# Patient Record
Sex: FEMALE | Race: OTHER | HISPANIC OR LATINO | Employment: FULL TIME | URBAN - METROPOLITAN AREA
[De-identification: names, ages, dates, MRNs, and addresses within clinical notes are randomized per-mention and may not be internally consistent; named-entity substitution may affect disease eponyms.]

---

## 2017-03-31 ENCOUNTER — HOSPITAL ENCOUNTER (OUTPATIENT)
Dept: RADIOLOGY | Facility: HOSPITAL | Age: 47
Discharge: HOME | End: 2017-03-31
Payer: COMMERCIAL

## 2017-03-31 ENCOUNTER — ALLSCRIPTS OFFICE VISIT (OUTPATIENT)
Dept: OTHER | Facility: OTHER | Age: 47
End: 2017-03-31

## 2017-03-31 ENCOUNTER — TRANSCRIBE ORDERS (OUTPATIENT)
Dept: RADIOLOGY | Facility: HOSPITAL | Age: 47
End: 2017-03-31

## 2017-03-31 DIAGNOSIS — J40 BRONCHITIS: ICD-10-CM

## 2017-03-31 DIAGNOSIS — M54.9 DORSALGIA: ICD-10-CM

## 2017-03-31 DIAGNOSIS — R50.9 FEVER: ICD-10-CM

## 2017-03-31 PROCEDURE — 71020 HB CHEST X-RAY 2VW FRONTAL&LATL: CPT

## 2017-04-03 ENCOUNTER — GENERIC CONVERSION - ENCOUNTER (OUTPATIENT)
Dept: OTHER | Facility: OTHER | Age: 47
End: 2017-04-03

## 2017-04-13 ENCOUNTER — GENERIC CONVERSION - ENCOUNTER (OUTPATIENT)
Dept: OTHER | Facility: OTHER | Age: 47
End: 2017-04-13

## 2017-08-04 ENCOUNTER — APPOINTMENT (OUTPATIENT)
Dept: PHYSICAL THERAPY | Facility: MEDICAL CENTER | Age: 47
End: 2017-08-04
Payer: COMMERCIAL

## 2017-08-04 ENCOUNTER — GENERIC CONVERSION - ENCOUNTER (OUTPATIENT)
Dept: OTHER | Facility: OTHER | Age: 47
End: 2017-08-04

## 2017-08-04 PROCEDURE — 97161 PT EVAL LOW COMPLEX 20 MIN: CPT

## 2017-08-04 PROCEDURE — 97140 MANUAL THERAPY 1/> REGIONS: CPT

## 2017-08-07 ENCOUNTER — APPOINTMENT (OUTPATIENT)
Dept: PHYSICAL THERAPY | Facility: MEDICAL CENTER | Age: 47
End: 2017-08-07
Payer: COMMERCIAL

## 2017-08-07 PROCEDURE — 97010 HOT OR COLD PACKS THERAPY: CPT

## 2017-08-07 PROCEDURE — 97110 THERAPEUTIC EXERCISES: CPT

## 2017-08-07 PROCEDURE — 97140 MANUAL THERAPY 1/> REGIONS: CPT

## 2017-08-11 ENCOUNTER — APPOINTMENT (OUTPATIENT)
Dept: PHYSICAL THERAPY | Facility: MEDICAL CENTER | Age: 47
End: 2017-08-11
Payer: COMMERCIAL

## 2017-08-14 ENCOUNTER — APPOINTMENT (OUTPATIENT)
Dept: PHYSICAL THERAPY | Facility: MEDICAL CENTER | Age: 47
End: 2017-08-14
Payer: COMMERCIAL

## 2017-08-14 PROCEDURE — 97140 MANUAL THERAPY 1/> REGIONS: CPT

## 2017-08-14 PROCEDURE — 97110 THERAPEUTIC EXERCISES: CPT

## 2017-08-18 ENCOUNTER — APPOINTMENT (OUTPATIENT)
Dept: PHYSICAL THERAPY | Facility: MEDICAL CENTER | Age: 47
End: 2017-08-18
Payer: COMMERCIAL

## 2017-08-23 ENCOUNTER — APPOINTMENT (OUTPATIENT)
Dept: PHYSICAL THERAPY | Facility: MEDICAL CENTER | Age: 47
End: 2017-08-23
Payer: COMMERCIAL

## 2017-08-23 PROCEDURE — 97140 MANUAL THERAPY 1/> REGIONS: CPT

## 2017-08-23 PROCEDURE — 97110 THERAPEUTIC EXERCISES: CPT

## 2017-08-25 ENCOUNTER — GENERIC CONVERSION - ENCOUNTER (OUTPATIENT)
Dept: OTHER | Facility: OTHER | Age: 47
End: 2017-08-25

## 2017-08-25 ENCOUNTER — APPOINTMENT (EMERGENCY)
Dept: CT IMAGING | Facility: HOSPITAL | Age: 47
End: 2017-08-25
Payer: COMMERCIAL

## 2017-08-25 ENCOUNTER — HOSPITAL ENCOUNTER (EMERGENCY)
Facility: HOSPITAL | Age: 47
Discharge: HOME | End: 2017-08-25
Admitting: EMERGENCY MEDICINE
Payer: COMMERCIAL

## 2017-08-25 VITALS
OXYGEN SATURATION: 98 % | DIASTOLIC BLOOD PRESSURE: 80 MMHG | HEART RATE: 86 BPM | WEIGHT: 180 LBS | TEMPERATURE: 98.1 F | RESPIRATION RATE: 16 BRPM | SYSTOLIC BLOOD PRESSURE: 124 MMHG

## 2017-08-25 DIAGNOSIS — N23 RENAL COLIC ON RIGHT SIDE: Primary | ICD-10-CM

## 2017-08-25 LAB
ALBUMIN SERPL BCP-MCNC: 3.4 G/DL (ref 3.5–5)
ALP SERPL-CCNC: 102 U/L (ref 46–116)
ALT SERPL W P-5'-P-CCNC: 28 U/L (ref 12–78)
ANION GAP SERPL CALCULATED.3IONS-SCNC: 10 MMOL/L (ref 4–13)
AST SERPL W P-5'-P-CCNC: 13 U/L (ref 5–45)
ATRIAL RATE: 86 BPM
BACTERIA UR QL AUTO: ABNORMAL /HPF
BASOPHILS # BLD AUTO: 0.03 THOUSANDS/ΜL (ref 0–0.1)
BASOPHILS NFR BLD AUTO: 0 % (ref 0–1)
BILIRUB SERPL-MCNC: 0.35 MG/DL (ref 0.2–1)
BILIRUB UR QL STRIP: NEGATIVE
BUN SERPL-MCNC: 13 MG/DL (ref 5–25)
CALCIUM SERPL-MCNC: 8.8 MG/DL (ref 8.3–10.1)
CHLORIDE SERPL-SCNC: 105 MMOL/L (ref 100–108)
CLARITY UR: CLEAR
CLARITY, POC: CLEAR
CO2 SERPL-SCNC: 25 MMOL/L (ref 21–32)
COLOR UR: YELLOW
COLOR, POC: YELLOW
CREAT SERPL-MCNC: 0.67 MG/DL (ref 0.6–1.3)
EOSINOPHIL # BLD AUTO: 0.09 THOUSAND/ΜL (ref 0–0.61)
EOSINOPHIL NFR BLD AUTO: 1 % (ref 0–6)
ERYTHROCYTE [DISTWIDTH] IN BLOOD BY AUTOMATED COUNT: 14.9 % (ref 11.6–15.1)
GFR SERPL CREATININE-BSD FRML MDRD: 106 ML/MIN/1.73SQ M
GLUCOSE SERPL-MCNC: 111 MG/DL (ref 65–140)
GLUCOSE UR STRIP-MCNC: NEGATIVE MG/DL
HCG UR QL: NEGATIVE
HCT VFR BLD AUTO: 36.1 % (ref 34.8–46.1)
HGB BLD-MCNC: 12.2 G/DL (ref 11.5–15.4)
HGB UR QL STRIP.AUTO: ABNORMAL
KETONES UR STRIP-MCNC: NEGATIVE MG/DL
LEUKOCYTE ESTERASE UR QL STRIP: NEGATIVE
LIPASE SERPL-CCNC: 209 U/L (ref 73–393)
LYMPHOCYTES # BLD AUTO: 3.3 THOUSANDS/ΜL (ref 0.6–4.47)
LYMPHOCYTES NFR BLD AUTO: 32 % (ref 14–44)
MCH RBC QN AUTO: 27.1 PG (ref 26.8–34.3)
MCHC RBC AUTO-ENTMCNC: 33.8 G/DL (ref 31.4–37.4)
MCV RBC AUTO: 80 FL (ref 82–98)
MONOCYTES # BLD AUTO: 1.01 THOUSAND/ΜL (ref 0.17–1.22)
MONOCYTES NFR BLD AUTO: 10 % (ref 4–12)
NEUTROPHILS # BLD AUTO: 5.91 THOUSANDS/ΜL (ref 1.85–7.62)
NEUTS SEG NFR BLD AUTO: 57 % (ref 43–75)
NITRITE UR QL STRIP: NEGATIVE
NON-SQ EPI CELLS URNS QL MICRO: ABNORMAL /HPF
NRBC BLD AUTO-RTO: 0 /100 WBCS
OTHER STN SPEC: ABNORMAL
P AXIS: 54 DEGREES
PH UR STRIP.AUTO: 6 [PH] (ref 4.5–8)
PLATELET # BLD AUTO: 272 THOUSANDS/UL (ref 149–390)
PMV BLD AUTO: 10.1 FL (ref 8.9–12.7)
POTASSIUM SERPL-SCNC: 3.5 MMOL/L (ref 3.5–5.3)
PR INTERVAL: 146 MS
PROT SERPL-MCNC: 7.4 G/DL (ref 6.4–8.2)
PROT UR STRIP-MCNC: NEGATIVE MG/DL
QRS AXIS: 76 DEGREES
QRSD INTERVAL: 82 MS
QT INTERVAL: 344 MS
QTC INTERVAL: 411 MS
RBC # BLD AUTO: 4.5 MILLION/UL (ref 3.81–5.12)
RBC #/AREA URNS AUTO: ABNORMAL /HPF
SODIUM SERPL-SCNC: 140 MMOL/L (ref 136–145)
SP GR UR STRIP.AUTO: 1.01 (ref 1–1.03)
T WAVE AXIS: 49 DEGREES
UROBILINOGEN UR QL STRIP.AUTO: 0.2 E.U./DL
VENTRICULAR RATE: 86 BPM
WBC # BLD AUTO: 10.34 THOUSAND/UL (ref 4.31–10.16)
WBC #/AREA URNS AUTO: ABNORMAL /HPF

## 2017-08-25 PROCEDURE — 74176 CT ABD & PELVIS W/O CONTRAST: CPT

## 2017-08-25 PROCEDURE — 80053 COMPREHEN METABOLIC PANEL: CPT | Performed by: PHYSICIAN ASSISTANT

## 2017-08-25 PROCEDURE — 81001 URINALYSIS AUTO W/SCOPE: CPT

## 2017-08-25 PROCEDURE — 81002 URINALYSIS NONAUTO W/O SCOPE: CPT | Performed by: PHYSICIAN ASSISTANT

## 2017-08-25 PROCEDURE — 36415 COLL VENOUS BLD VENIPUNCTURE: CPT | Performed by: PHYSICIAN ASSISTANT

## 2017-08-25 PROCEDURE — 85025 COMPLETE CBC W/AUTO DIFF WBC: CPT | Performed by: PHYSICIAN ASSISTANT

## 2017-08-25 PROCEDURE — 96375 TX/PRO/DX INJ NEW DRUG ADDON: CPT

## 2017-08-25 PROCEDURE — 81025 URINE PREGNANCY TEST: CPT | Performed by: PHYSICIAN ASSISTANT

## 2017-08-25 PROCEDURE — 93005 ELECTROCARDIOGRAM TRACING: CPT | Performed by: PHYSICIAN ASSISTANT

## 2017-08-25 PROCEDURE — 96361 HYDRATE IV INFUSION ADD-ON: CPT

## 2017-08-25 PROCEDURE — 99285 EMERGENCY DEPT VISIT HI MDM: CPT

## 2017-08-25 PROCEDURE — 96374 THER/PROPH/DIAG INJ IV PUSH: CPT

## 2017-08-25 PROCEDURE — 83690 ASSAY OF LIPASE: CPT | Performed by: PHYSICIAN ASSISTANT

## 2017-08-25 RX ORDER — KETOROLAC TROMETHAMINE 30 MG/ML
15 INJECTION, SOLUTION INTRAMUSCULAR; INTRAVENOUS ONCE
Status: COMPLETED | OUTPATIENT
Start: 2017-08-25 | End: 2017-08-25

## 2017-08-25 RX ORDER — MORPHINE SULFATE 4 MG/ML
2 INJECTION, SOLUTION INTRAMUSCULAR; INTRAVENOUS ONCE
Status: COMPLETED | OUTPATIENT
Start: 2017-08-25 | End: 2017-08-25

## 2017-08-25 RX ADMIN — KETOROLAC TROMETHAMINE 15 MG: 30 INJECTION, SOLUTION INTRAMUSCULAR at 04:45

## 2017-08-25 RX ADMIN — MORPHINE SULFATE 2 MG: 4 INJECTION, SOLUTION INTRAMUSCULAR; INTRAVENOUS at 04:44

## 2017-08-25 RX ADMIN — SODIUM CHLORIDE 1000 ML: 0.9 INJECTION, SOLUTION INTRAVENOUS at 04:47

## 2017-08-26 ENCOUNTER — HOSPITAL ENCOUNTER (EMERGENCY)
Facility: HOSPITAL | Age: 47
Discharge: HOME | End: 2017-08-27
Attending: EMERGENCY MEDICINE | Admitting: EMERGENCY MEDICINE
Payer: COMMERCIAL

## 2017-08-26 DIAGNOSIS — R07.89 ACUTE CHEST WALL PAIN: Primary | ICD-10-CM

## 2017-08-26 DIAGNOSIS — R31.29 MICROSCOPIC HEMATURIA: ICD-10-CM

## 2017-08-27 ENCOUNTER — APPOINTMENT (EMERGENCY)
Dept: RADIOLOGY | Facility: HOSPITAL | Age: 47
End: 2017-08-27
Payer: COMMERCIAL

## 2017-08-27 VITALS
HEART RATE: 81 BPM | OXYGEN SATURATION: 97 % | HEIGHT: 60 IN | SYSTOLIC BLOOD PRESSURE: 126 MMHG | BODY MASS INDEX: 35.34 KG/M2 | RESPIRATION RATE: 16 BRPM | TEMPERATURE: 97.8 F | DIASTOLIC BLOOD PRESSURE: 64 MMHG | WEIGHT: 180 LBS

## 2017-08-27 PROCEDURE — 99283 EMERGENCY DEPT VISIT LOW MDM: CPT

## 2017-08-27 PROCEDURE — 96372 THER/PROPH/DIAG INJ SC/IM: CPT

## 2017-08-27 PROCEDURE — 71020 HB CHEST X-RAY 2VW FRONTAL&LATL: CPT

## 2017-08-27 RX ORDER — OXYCODONE HYDROCHLORIDE AND ACETAMINOPHEN 5; 325 MG/1; MG/1
1 TABLET ORAL EVERY 4 HOURS PRN
Qty: 12 TABLET | Refills: 0 | Status: SHIPPED | OUTPATIENT
Start: 2017-08-27 | End: 2018-01-30

## 2017-08-27 RX ORDER — KETOROLAC TROMETHAMINE 30 MG/ML
60 INJECTION, SOLUTION INTRAMUSCULAR; INTRAVENOUS ONCE
Status: COMPLETED | OUTPATIENT
Start: 2017-08-27 | End: 2017-08-27

## 2017-08-27 RX ADMIN — KETOROLAC TROMETHAMINE 60 MG: 30 INJECTION, SOLUTION INTRAMUSCULAR at 01:12

## 2017-08-28 ENCOUNTER — ALLSCRIPTS OFFICE VISIT (OUTPATIENT)
Dept: OTHER | Facility: OTHER | Age: 47
End: 2017-08-28

## 2017-08-28 DIAGNOSIS — B37.49 OTHER UROGENITAL CANDIDIASIS: ICD-10-CM

## 2017-08-28 DIAGNOSIS — M54.50 LOW BACK PAIN: ICD-10-CM

## 2017-08-28 LAB
BILIRUB UR QL STRIP: NORMAL
CLARITY UR: NORMAL
COLOR UR: YELLOW
GLUCOSE (HISTORICAL): NORMAL
HGB UR QL STRIP.AUTO: NORMAL
KETONES UR STRIP-MCNC: NORMAL MG/DL
LEUKOCYTE ESTERASE UR QL STRIP: NORMAL
NITRITE UR QL STRIP: NORMAL
PH UR STRIP.AUTO: 6 [PH]
PROT UR STRIP-MCNC: NORMAL MG/DL
SP GR UR STRIP.AUTO: 1.02
UROBILINOGEN UR QL STRIP.AUTO: 0.2

## 2017-08-30 ENCOUNTER — APPOINTMENT (OUTPATIENT)
Dept: PHYSICAL THERAPY | Facility: MEDICAL CENTER | Age: 47
End: 2017-08-30
Payer: COMMERCIAL

## 2017-08-30 ENCOUNTER — GENERIC CONVERSION - ENCOUNTER (OUTPATIENT)
Dept: OTHER | Facility: OTHER | Age: 47
End: 2017-08-30

## 2017-08-30 ENCOUNTER — HOSPITAL ENCOUNTER (OUTPATIENT)
Dept: ULTRASOUND IMAGING | Facility: HOSPITAL | Age: 47
Discharge: HOME | End: 2017-08-30
Payer: COMMERCIAL

## 2017-08-30 DIAGNOSIS — M54.50 LOW BACK PAIN: ICD-10-CM

## 2017-08-30 PROCEDURE — 76770 US EXAM ABDO BACK WALL COMP: CPT

## 2017-08-31 ENCOUNTER — GENERIC CONVERSION - ENCOUNTER (OUTPATIENT)
Dept: OTHER | Facility: OTHER | Age: 47
End: 2017-08-31

## 2018-01-10 NOTE — MISCELLANEOUS
Message   Recorded as Task   Date: 06/07/2016 09:07 AM, Created By: Nicky Quiles   Task Name: Follow Up   Assigned To: Holly Umana   Regarding Patient: Jah Bertrand, Status: In Progress   Julio C Garza - 07 Jun 2016 9:07 AM     TASK CREATED  Caller: Self; [phone] (Home); [phone] (Work)  pt having increased sensation to urinate call her at [address] Jun 2016 9:12 AM     TASK IN PROGRESS   Miri Pozo - 07 Jun 2016 9:27 AM     TASK EDITED  pt states since late afternoon yesterday she has increased frequency  unconfortable upon urination  no burning, no pressure, no discharge,no odor  Pt states it feels like a UTI  Cipro 500mg BID x 3 days sent to pharmacy as she cannot have macrobid  pt made aware  Sent to [facility] for a UA C&S, directed not to take medication until after giving urine sample  Active Problems    1  Allergic rhinitis (477 9) (J30 9)   2  Breast nodule (793 89) (N63)   3  Cervicitis (616 0) (N72)   4  Common migraine without aura (346 10) (G43 009)   5  Depression with anxiety (300 4) (F41 8)   6  Encounter for routine gynecological examination (V72 31) (Z01 419)   7  Encounter for screening for human papillomavirus (HPV) (V73 81) (Z11 51)   8  Encounter for screening mammogram for breast cancer (V76 12) (Z12 31)   9  Fatigue (780 79) (R53 83)   10  Menorrhagia (626 2) (N92 0)   11  Obesity (278 00) (E66 9)   12  Recurrent UTI (599 0) (N39 0)   13  Well adult exam (V70 0) (Z00 00)    Current Meds   1  Ciprofloxacin HCl - 500 MG Oral Tablet; TAKE 1 TABLET Twice daily As Directed x 3 days; Therapy: 22BJL2507 to (Evaluate:10Jun2016) Recorded   2  Naproxen 500 MG Oral Tablet; TAKE 1 TABLET EVERY 12 HOURS WITH FOOD; Therapy: 72SGC3560 to (Jada Nicolas)  Requested for: 13Apr2016; Last   Rx:13Apr2016 Ordered   3  Zyrtec 10 MG TABS; Therapy: (Recorded:13Apr2016) to Recorded    Allergies    1  Penicillins   2  Red Dye    3  Mold    Plan  Recurrent UTI    · (1) URINALYSIS w URINE C/S REFLEX (will reflex a microscopy if leukocytes, occult  blood, or nitrites are not within normal limits); Status:Active - Retrospective By Protocol  Authorization;  Requested VWV:29HZI5429;     Signatures   Electronically signed by : Ethel Manzanares LPN; Jun 7 9682  1:23CV EST                       (Author)

## 2018-01-10 NOTE — MISCELLANEOUS
Provider Comments  Provider Comments:   Patient no showed to called and left message to call back to possibly reschedule  Signatures   Electronically signed by :  Aicha Chavez MD; Sep 28 2016  5:37PM EST                       (Review)

## 2018-01-10 NOTE — MISCELLANEOUS
Provider Comments  Provider Comments:   pt no showed 1 year follow up today 4/13/17 with Ruperto Pandya  I tried calling her number, rang, started to go to voicemail, then rang again, then said cannot accept call at this time  Tried calling emergency contact to get an active number for pt  waiting to hear back   nk      Signatures   Electronically signed by : LUZ Partida;  Apr 13 2017 10:31AM EST                       (Author)

## 2018-01-12 ENCOUNTER — ALLSCRIPTS OFFICE VISIT (OUTPATIENT)
Dept: OTHER | Facility: OTHER | Age: 48
End: 2018-01-12

## 2018-01-12 VITALS
WEIGHT: 188.6 LBS | TEMPERATURE: 99.2 F | RESPIRATION RATE: 12 BRPM | SYSTOLIC BLOOD PRESSURE: 120 MMHG | DIASTOLIC BLOOD PRESSURE: 86 MMHG | BODY MASS INDEX: 35.61 KG/M2 | HEART RATE: 60 BPM | HEIGHT: 61 IN

## 2018-01-12 NOTE — MISCELLANEOUS
Message   Recorded as Task   Date: 08/26/2016 08:57 AM, Created By: Britany Henriquez   Task Name: Go to Result   Assigned To: Fredis Lopez   Regarding Patient: Caroline Blum, Status: In Progress   Comment:    GrantJennifer weberbeth - 26 Aug 2016 8:57 AM     TASK CREATED  Please call patient to inform her that urine culture was negative  If she is still symptomatic, she may be having bladder irritation from bladder irritants such as caffeine, coffee, spicy or acidic food  If her symptoms have resolved, nothing to do  Patric Singh - 26 Aug 2016 10:15 AM     TASK IN PROGRESS   Mayank Arzola - 26 Aug 2016 10:16 AM     TASK EDITED  made patient ware  Active Problems    1  Allergic rhinitis (477 9) (J30 9)   2  Breast nodule (793 89) (N63)   3  Cervicitis (616 0) (N72)   4  Common migraine without aura (346 10) (G43 009)   5  Depression with anxiety (300 4) (F41 8)   6  Encounter for routine gynecological examination (V72 31) (Z01 419)   7  Encounter for screening for human papillomavirus (HPV) (V73 81) (Z11 51)   8  Encounter for screening mammogram for breast cancer (V76 12) (Z12 31)   9  Fatigue (780 79) (R53 83)   10  Iron deficiency anemia, unspecified iron deficiency anemia type (280 9) (D50 9)   11  Menorrhagia (626 2) (N92 0)   12  Obesity (278 00) (E66 9)   13  Recurrent UTI (599 0) (N39 0)   14  Vitamin D deficiency (268 9) (E55 9)   15  Well adult exam (V70 0) (Z00 00)    Current Meds   1  Ciprofloxacin HCl - 500 MG Oral Tablet (Cipro); TAKE 1 TABLET Twice daily x 3 days; Therapy: 12Owv7740 to (Evaluate:17Kqi8492)  Requested for: 68Lol1247; Last   Rx:08Bjb0076 Ordered   2  Ferrous Sulfate 325 (65 Fe) MG Oral Tablet Delayed Release; Take 1 tablet daily; Therapy: 77MJW7146 to (Venancio Fields)  Requested for: 77YNH2315; Last   Rx:08Jun2016 Ordered   3  Naproxen 500 MG Oral Tablet; TAKE 1 TABLET EVERY 12 HOURS WITH FOOD;    Therapy: 02ASK2192 to (Niko Kenney)  Requested for: 06DGN5469; Last   Rx:13Apr2016 Ordered   4  Vitamin D3 5000 UNIT Oral Capsule; take 1 capsule daily; Therapy: 16CXN2235 to (Last Rx:08Jun2016)  Requested for: 00QSU0143 Ordered   5  Zyrtec 10 MG TABS; Therapy: (Recorded:13Apr2016) to Recorded    Allergies    1  Penicillins   2  Red Dye    3  Mold    Signatures   Electronically signed by : Jaguar Valadez LPN;  Aug 26 2016 10:16AM EST                       (Author)

## 2018-01-13 NOTE — RESULT NOTES
Message   CXR showed NO pneumonia  Verified Results  * XR CHEST PA & LATERAL [id number] 01:41PM Morgan Leonard    Order Number: [id number]     Test Name Result Flag Reference   XR CHEST PA & LATERAL (Report)     CHEST - DUAL ENERGY     INDICATION: 70-year-old female, cough, bronchitis   COMPARISON: None     VIEWS: PA (including soft tissue/bone algorithms) and lateral projections; 4 images     FINDINGS:     The lungs are clear  No pleural effusions  The cardiomediastinal silhouette is unremarkable  Bony thorax is unremarkable         IMPRESSION:     No acute cardiopulmonary disease        Workstation performed: [id number]     Signed by:   Patt Rojas MD   4/1/17

## 2018-01-13 NOTE — PROGRESS NOTES
Assessment    1  Obesity (278 00) (E66 9)   2  Fatigue (780 79) (R53 83)   3  Encounter for preventive health examination (V70 0) (Z00 00)   4  Well adult exam (V70 0) (Z00 00)    Plan  Fatigue    · (1) CBC/PLT/DIFF; Status:Active; Requested for:13Apr2016;    · (1) COMPREHENSIVE METABOLIC PANEL; Status:Active; Requested for:13Apr2016;    · (1) LIPID PANEL FASTING W DIRECT LDL REFLEX; Status:Active; Requested  for:13Apr2016;    · (1) TSH WITH FT4 REFLEX; Status:Active; Requested for:13Apr2016;    · (1) VITAMIN D 25-HYDROXY; Status:Active; Requested for:13Apr2016;   Obesity    · Referral to Weight Management Progam Physician Referral  Consult  Status: Hold For -  Scheduling  Requested for: 13Apr2016  Care Summary provided  : Yes  PMH: Left shoulder tendonitis, PMH: Pain in left shoulder    · Naproxen 500 MG Oral Tablet; TAKE 1 TABLET EVERY 12 HOURS WITH FOOD  Unlinked    · Zyrtec 10 MG TABS    Discussion/Summary  health maintenance visit healthy adult female Currently, she eats a poor diet and has an inadequate exercise regimen  cervical cancer screening is current cervical cancer screening is managed by Dr hCiara Pagan Breast cancer screening: self breast exam technique was taught, monthly self breast exam was advised, mammogram is current, mammogram is needed every year and breast cancer screening is managed by Dr Chiara Pagan  Colorectal cancer screening: colorectal cancer screening is not indicated  Osteoporosis screening: bone mineral density testing is not indicated  Screening lab work includes hemoglobin, glucose, lipid profile, thyroid function testing and 25-hydroxyvitamin D  The risks and benefits of immunizations were discussed and patient declines immunizations  Advice and education were given regarding nutrition, aerobic exercise, weight bearing exercise, weight loss, calcium supplements, vitamin D supplements, reproductive health, sunscreen use and self skin examination   Patient discussion: discussed with the patient  Try to exercise 30 minutes 5 x per week  Follow a low fat/ low cholesterol diet  May try Melatonin OTC to help with sleeping  Referral given to  weight loss program    Will get routine blood work done in June of 2016- has slip  f/u one year / prn  Possible side effects of new medications were reviewed with the patient/guardian today  Chief Complaint  Patient here for wellness exam      History of Present Illness  HM, Adult Female: The patient is being seen for a health maintenance evaluation  The last health maintenance visit was 2-3 year(s) ago  Social History: Household members include 1 son(s) and mother  She is   Work status: working full time and occupation: Admissions counselor at [employer]   The patient has never smoked cigarettes  She reports never drinking alcohol  She has never used illicit drugs  General Health: The patient's health since the last visit is described as fair  She has regular dental visits  She complains of vision problems  Vision care includes wearing glasses  She denies hearing loss  Immunizations status: not up to date   Needs Tdap- will call insurance and check coverage, declines flu  Lifestyle:  She does not have a healthy diet  She has weight concerns  Weight control issues: overweight  She does not exercise regularly  She does not use tobacco  She denies alcohol use  She denies drug use  travels a lot for work  Reproductive health: the patient is premenopausal   she reports normal menses  she uses no contraception  she is sexually active  She is monogamous with a male partner  Screening: Cervical cancer screening includes a pap smear performed June of 2015, human papilloma virus screening performed June of 2015 and no previous colposcopy  Breast cancer screening includes a mammogram performed October of 2015, a clinical breast exam performed June of 2015 and irregular breast self-exams performed   Colorectal cancer screening includes no previous colonoscopy, no previous fecal occult blood testing and no previous flexible sigmoidoscopy  Metabolic screening includes lipid profile performed within the past five years, glucose screening performed last year, thyroid function test performed last year and no previous DEXA  Cardiovascular risk factors: stress, obesity and sedentary lifestyle, but no hypertension, no diabetes, no high LDL cholesterol, no low HDL cholesterol, no tobacco use, no illicit drug use and no family history of cardiovascular disease  General health risks: osteoporosis risk factors, but no previous breast cancer, no abnormal cervical cytology, no positive screening for human papilloma virus and no inflammatory bowel disease  Safety elements used: seat belt, sunscreen and smoke detector  Risk findings: no unsafe sexual behavior and no stress management concerns  HPI: Patient presents by herself today for a routine exam     Reports she is doing well- no acute issues today  Her father just passed away in February of 2016- states she is doing okay with this  Mother lives with her  Is dating someone right now  Is working full time and going to [employer]  Obesity- acknowledges her diet is poor and she needs to exercise- would like to see  weight loss program for guidance/options  Fatigue- feels tired on most days  Some nights she can fall right asleep and other nights, she can't sleep  Is stressed with work right now too  Review of Systems    Constitutional: no fever, not feeling poorly, no chills and not feeling tired  ENT: no earache, no sore throat and no nasal discharge  Cardiovascular: no chest pain, no palpitations and no lower extremity edema  Respiratory: no shortness of breath, no cough and no wheezing  Gastrointestinal: no abdominal pain, no nausea, no vomiting and no diarrhea  Genitourinary: no dysuria     The patient presents with complaints of gradual onset of mild right elbow no arthralgias  Integumentary: no rashes  Neurological: no headache, no numbness, no tingling and no dizziness  Psychiatric: no anxiety and no depression  Endocrine: hot flashes  Hematologic/Lymphatic: no swollen glands, no tendency for easy bleeding and no tendency for easy bruising  ROS reviewed  Active Problems    1  Allergic rhinitis (477 9) (J30 9)   2  Breast nodule (793 89) (N63)   3  Cervicitis (616 0) (N72)   4  Common migraine without aura (346 10) (G43 009)   5  Depression with anxiety (300 4) (F41 8)   6  Encounter for routine gynecological examination (V72 31) (Z01 419)   7  Encounter for screening for human papillomavirus (HPV) (V73 81) (Z11 51)   8  Encounter for screening mammogram for breast cancer (V76 12) (Z12 31)   9  Menorrhagia (626 2) (N92 0)   10   Obesity (278 00) (E66 9)    Past Medical History    · History of Cellulitis of leg (682 6) (L03 119)   · History of Forceps delivery (669 50) (O66 5)   · History of  4 (V22 2) (Z33 1)   · History of acute bronchitis (V12 69) (Z87 09)   · History of acute conjunctivitis (V12 49) (Z86 69)   · History of acute sinusitis (V12 69) (Z87 09)   · History of conjunctivitis (V12 49) (Z86 69)   · History of depression (V11 8) (Z86 59)   · History of diarrhea (V12 79) (U83 921)   · History of eczema (V13 3) (Z87 2)   · History of fatigue (V13 89) (Z87 898)   · History of folliculitis (O50 9) (A69 6)   · History of herpes zoster (V12 09) (Z86 19)   · History of molar pregnancy, antepartum (V23 1) (O09 10)   · History of sebaceous cyst (V13 3) (Z87 2)   · History of sinusitis (V12 69) (Z87 09)   · History of tinea corporis (V12 09) (Z86 19)   · History of trichomoniasis (V12 09) (Z86 19)   · History of upper respiratory infection (V12 09) (Z87 09)   · History of vaginitis (V13 29) (Z87 42)   · History of Left shoulder tendonitis (726 10) (M75 82)   · Menorrhagia (626 2) (N92 0)   · History of Neck Strain (847 0)   · History of Pain in left shoulder (719 41) (M25 512)   · History of Prior pregnancy with fetal demise (V23 5) (O09 299)   · History of Skin Abscess Of Leg (682 6)   · History of  (spontaneous vaginal delivery) (650) (O80)   · History of Trapezius Muscle Strain (840 8)    Surgical History    · History of Adenoidectomy   · History of Appendectomy   · History of Dilation And Curettage   · History of Dilation And Curettage For Hydatidiform Mole   · History of Laparoscopy (Diagnostic)   · History of Oophorectomy   · History of Tonsillectomy Over Age 16   · History of Tubal Ligation   · History of Ventral Hernia Repair    Family History    · Family history of Chronic Obstructive Pulmonary Disease    · Family history of Sexually active with members of same gender    · Family history of Diabetes Mellitus (V18 0)    · Family history of Diabetes Mellitus (V18 0)    Social History    · Denied: History of Alcohol   · Drinks coffee   · 3 cup a day   · Lack of exercise (V69 0) (Z72 3)   · Never A Smoker   · No drug use   · Not currently sexually active    Current Meds   1  Naproxen 500 MG Oral Tablet; TAKE 1 TABLET EVERY 12 HOURS WITH FOOD; Therapy: 00ZHK4580 to (Evaluate:42Bgq8282)  Requested for: 32STL7166; Last   Rx:96Tdr7887 Ordered   2  Zyrtec 10 MG TABS; Therapy: (Recorded:2016) to Recorded    Allergies    1  Penicillins   2  Red Dye    3  Mold    Vitals   Recorded: 2016 08:08AM   Temperature 97 6 F   Heart Rate 68   Respiration 12   Systolic 633   Diastolic 74   Height 5 ft 0 5 in   Weight 177 lb 6 08 oz   BMI Calculated 34 07   BSA Calculated 1 79   Pain Scale 0     Physical Exam    Constitutional   General appearance: No acute distress, well appearing and well nourished  Eyes   Conjunctiva and lids: No swelling, erythema or discharge  Pupils and irises: Equal, round, reactive to light  Ears, Nose, Mouth, and Throat   Oropharynx: Normal with no erythema, edema, exudate or lesions      Neck   Neck: Supple, symmetric, trachea midline, no masses  Thyroid: Normal, no thyromegaly  Pulmonary   Respiratory effort: No increased work of breathing or signs of respiratory distress  Auscultation of lungs: Clear to auscultation  Cardiovascular   Auscultation of heart: Normal rate and rhythm, normal S1 and S2, no murmurs  Carotid pulses: 2+ bilaterally  Abdominal aorta: Normal     Peripheral vascular exam: Normal     Examination of extremities for edema and/or varicosities: Normal     Abdomen   Abdomen: Non-tender, no masses  Liver and spleen: No hepatomegaly or splenomegaly  Examination for hernias: No hernia appreciated  Lymphatic   Palpation of lymph nodes in neck: No lymphadenopathy  Musculoskeletal   Gait and station: Normal     Digits and nails: Normal without clubbing or cyanosis  Joints, bones, and muscles: Abnormal   Appearance - normal  Palpation - normal except as noted: right elbow tenderness  Range of motion: Normal     Stability: Normal     Muscle strength/tone: Normal     Skin   Skin and subcutaneous tissue: Normal without rashes or lesions  Palpation of skin and subcutaneous tissue: Normal turgor  Neurologic   Cranial nerves: Cranial nerves II-XII intact  Reflexes: 2+ and symmetric  Sensation: No sensory loss  Psychiatric   Mood and affect: Normal        Attending Note  Collaborating Physician Note: Collaborating Physician: I agree with the Advanced Practitioner note  Future Appointments    Date/Time Provider Specialty Site   04/13/2017 08:00 AM Fabián Shabazz, [address] [facility]     Signatures   Electronically signed by : LUZ Massey; Apr 13 2016  8:50AM EST                       (Author)    Electronically signed by :  Micaela Monroe MD; Apr 13 2016  1:06PM EST                       (Author)

## 2018-01-13 NOTE — PROGRESS NOTES
Assessment   1  Fatigue (780 79) (R53 83)   2  Weight gain (783 1) (R63 5)   3  Myalgia (729 1) (M79 1)   4  Vitamin D deficiency (268 9) (E55 9)   5  Arthralgia of multiple sites (719 49) (M25 50)   6  Iron deficiency anemia, unspecified iron deficiency anemia type (280 9) (D50 9)   7  Severe obesity (BMI 35 0-35 9 with comorbidity) (278 01,V85 35) (E66 01,Z68 35)    Plan   Arthralgia of multiple sites, Fatigue, Myalgia, Weight gain    · (1) COMPREHENSIVE METABOLIC PANEL; Status:Active; Requested SAGAR:49HIF4924;    · (1) T4, FREE; Status:Active; Requested PVT:63LIR0078;    · (Q) CBC (INCLUDES DIFF/PLT) (REFL); Status:Active; Requested PWY:12DWP1389;    · (Q) TSH, 3RD GENERATION; Status:Active; Requested YGR:69LZR0359; Arthralgia of multiple sites, Myalgia, Vitamin D deficiency    · (1) C-REACTIVE PROTEIN; Status:Active; Requested PJT:69JJV3730;    · (Q) KEM SCREEN, IFA, WITH REFLEX TO TITER AND PATTERN; Status:Active; Requested PDZ:02JLD8205;    · (Q) LYME DISEASE AB, TOTAL W/REFL WB (IGG, IGM); Status:Active; Requested    NIKHIL:18RMS8783;    · (Q) SED RATE BY MODIFIED Leward Riis; Status:Active; Requested OJZ:85OBE8710;    · *(Q) VITAMIN D, 25-HYDROXY, LC/MS/MS; Status:Active; Requested MCS:65NBY6105;   Iron deficiency anemia, unspecified iron deficiency anemia type    · (1) FERRITIN; Status:Active; Requested RQX:45BLT6820;    · (1) IRON; Status:Active; Requested [id number]; Severe obesity (BMI 35 0-35 9 with comorbidity)    · Begin or continue regular aerobic exercise   Gradually work up to at least 3 sessions of 30    minutes of exercise a week ; Status:Complete;   Done: 70VVU0799 12:31PM   · Eat a low fat and low cholesterol diet ; Status:Complete;   Done: 15VSY8968 12:31PM   · Some eating tips that can help you lose weight ; Status:Complete;   Done: 68JWC4674    12:31PM   · We recommend that you follow the Mediterranean diet ; Status:Complete;   Done:    50LUP3889 12:31PM    Discussion/Summary Patient presents to the office c/o feeling very tired, myalgias, arthralgias for the last 2-3 months  check labs to R/o thyroid disease, Lyme disease  check CBC with dif, Iron, Ferritin due to prior H/o iron deficiency anemia  vit D 25 OH  to stay well hydrated, follow a low fat, low cholesterol diet  regular exercise, weight reduction  call patient with blood test results  The patient was counseled regarding diagnostic results,-- instructions for management,-- risk factor reductions,-- risks and benefits of treatment options,-- importance of compliance with treatment  The treatment plan was reviewed with the patient/guardian  The patient/guardian understands and agrees with the treatment plan      Chief Complaint   Patient presents fatigued and extremely tired  History of Present Illness   HPI: Patient presents to the office c/o feeling very tired and achy for the last 2-3 months  pain in muscles and joints  has H/o Vitamin D deficiency, iron deficiency anemia in the past  does not take any supplements or medications at the present time  feeling depressed  C/o weight gain  She gained 8 pounds since March 2017  hot flashes  No cough  [place]  Denies H/o tick bites  chest pain, shortness of breath  No GI symptoms  Review of Systems        Constitutional: feeling tired, but-- no fever-- and-- no chills  Gained 8 lb since 3/17  ENT: no earache,-- no nosebleeds,-- no sore throat,-- no hearing loss,-- no nasal discharge-- and-- no hoarseness  Cardiovascular: no chest pain,-- no intermittent leg claudication,-- no palpitations-- and-- no lower extremity edema  Respiratory: no shortness of breath,-- no cough,-- no wheezing-- and-- no shortness of breath during exertion  Gastrointestinal: no abdominal pain,-- no nausea,-- no vomiting,-- no constipation-- and-- no blood in stools        Genitourinary: no dysuria,-- no pelvic pain-- and-- no incontinence  Musculoskeletal: arthralgias-- and-- myalgias  Integumentary: no rashes-- and-- no itching  Neurological: no headache,-- no dizziness-- and-- no fainting  Active Problems   1  Allergic rhinitis (477 9) (J30 9)   2  Breast nodule (793 89) (N63 0)   3  Bronchitis (490) (J40)   4  Common migraine without aura (346 10) (G43 009)   5  Depression with anxiety (300 4) (F41 8)   6  Encounter for gynecological examination without abnormal finding (V72 31) (Z01 419)   7  Encounter for screening mammogram for breast cancer (V76 12) (Z12 31)   8  Fatigue (780 79) (R53 83)   9  Female pelvic pain (625 9) (R10 2)   10  Iron deficiency anemia, unspecified iron deficiency anemia type (280 9) (D50 9)   11  Irregular menses (626 4) (N92 6)   12  Menorrhagia (626 2) (N92 0)   13  Obesity (278 00) (E66 9)   14  Vitamin D deficiency (268 9) (E55 9)    Past Medical History   1  History of Back muscle spasm (724 8) (M62 830)   2  History of Cellulitis of leg (682 6) (L03 119)   3  History of Forceps delivery (669 50) (O66 5)   4  History of  4 (V22 2) (Z33 1)   5  History of  4   6  History of acute bronchitis (V12 69) (Z87 09)   7  History of acute conjunctivitis (V12 49) (Z86 69)   8  History of depression (V11 8) (Z86 59)   9  History of diarrhea (V12 79) (Z87 898)   10  History of eczema (V13 3) (Z87 2)   11  History of fatigue (V13 89) (Z87 898)   12  History of herpes zoster (V12 09) (Z86 19)   13  History of molar pregnancy, antepartum (V23 1) (O09 A0)   14  History of sebaceous cyst (V13 3) (Z87 2)   15  History of sinusitis (V12 69) (Z87 09)   16  History of tinea corporis (V12 09) (Z86 19)   17  History of trichomoniasis (V12 09) (Z86 19)   18  History of vaginitis (V13 29) (Z87 42)   19  Menorrhagia (626 2) (N92 0)   20  History of Pain in left shoulder (719 41) (M25 512)   21  History of Prior pregnancy with fetal demise (V23 5) (O09 299)   22   History of Right low back pain (724 2) (M54 5)   23  History of  (spontaneous vaginal delivery) (650) (O80)   24  History of Upper back pain on right side (724 5) (M54 9)   25  History of Yeast UTI (112 2) (B37 49)  Active Problems And Past Medical History Reviewed: The active problems and past medical history were reviewed and updated today  Family History   Father    1  Family history of Chronic Obstructive Pulmonary Disease   2  Family history of lung cancer (V16 1) (Z80 1)  Son    3  Family history of Sexually active with members of same gender  Maternal Grandmother    4  Family history of Diabetes Mellitus (V18 0)  Maternal Aunt    5  Family history of Diabetes Mellitus (V18 0)  Family History Reviewed: The family history was reviewed and updated today  Social History    · Denied: History of Alcohol   · Drinks coffee   · 3 cup a day   · Lack of exercise (V69 0) (Z72 3)   · Never A Smoker   · No drug use   · Not currently sexually active  The social history was reviewed and updated today  Surgical History   1  History of Adenoidectomy   2  History of Appendectomy   3  History of Dilation And Curettage   4  History of Dilation And Curettage For Hydatidiform Mole   5  History of Laparoscopy (Diagnostic)   6  History of Oophorectomy   7  History of Tonsillectomy Over Age 15   11  History of Tubal Ligation   9  History of Ventral Hernia Repair  Surgical History Reviewed: The surgical history was reviewed and updated today  Current Meds    1  Cyclobenzaprine HCl - 10 MG Oral Tablet; take 1 tablet by mouth at bedtime if needed; Therapy: 28Cfx0078 to (Evaluate:2017)  Requested for: 2017; Last     Rx:10Lbh8072 Ordered   2  Fluconazole 150 MG Oral Tablet; TAKE 1 TABLET 1 TIME ONLY; Therapy: 24Jtu5906 to (Evaluate:67Zmp4969)  Requested for: 80Nrt6440; Last     Rx:2017 Ordered   3  Vitamin D3 5000 UNIT Oral Capsule; take 1 capsule daily;      Therapy: 01XUQ3723 to (Last Rx:2016)  Requested for: 67Kkf1490 Ordered   4  Zyrtec 10 MG TABS; Therapy: (Gabe Redhead) to Recorded     The medication list was reviewed and updated today  Allergies   1  Penicillins   2  Red Dye  3  Mold    Vitals    Recorded: 12Jan2018 11:43AM   Temperature 98 8 F, Tympanic   Heart Rate 90, L Radial   Pulse Quality Normal, L Radial   Respiration Quality Normal   Respiration 16   Systolic 760, LUE, Sitting   Diastolic 72, LUE, Sitting   Height 5 ft 1 in   Weight 189 lb 4 oz   BMI Calculated 35 76   BSA Calculated 1 85   O2 Saturation 97   Pain Scale 0     Physical Exam        Constitutional      General appearance: No acute distress, well appearing and well nourished  -- Obese  Eyes      Conjunctiva and lids: No swelling, erythema or discharge  Pupils and irises: Equal, round and reactive to light  Ears, Nose, Mouth, and Throat      External inspection of ears and nose: Normal        Otoscopic examination: Tympanic membranes translucent with normal light reflex  Canals patent without erythema  Oropharynx: Normal with no erythema, edema, exudate or lesions  Pulmonary      Respiratory effort: No increased work of breathing or signs of respiratory distress  Auscultation of lungs: Clear to auscultation  Cardiovascular      Auscultation of heart: Normal rate and rhythm, normal S1 and S2, without murmurs  Examination of extremities for edema and/or varicosities: Normal        Abdomen      Abdomen: Non-tender, no masses  Liver and spleen: No hepatomegaly or splenomegaly  Lymphatic      Palpation of lymph nodes in neck: No lymphadenopathy  -- Thyroid gland is not enlarged  Musculoskeletal      Gait and station: Normal        Digits and nails: Normal without clubbing or cyanosis  Inspection/palpation of joints, bones, and muscles: Normal        Skin      Skin and subcutaneous tissue: Normal without rashes or lesions         Psychiatric      Mood and affect: Normal           Future Appointments      Date/Time Provider Specialty Site   01/30/2018 09:40 AM LUZ Gomez Obstetrics/Gynecology [facility]     Signatures    Electronically signed by : RENY Dorantes ; Jan 12 2018 12:36PM EST                       (Author)

## 2018-01-14 VITALS
DIASTOLIC BLOOD PRESSURE: 96 MMHG | HEIGHT: 61 IN | WEIGHT: 181.25 LBS | RESPIRATION RATE: 14 BRPM | SYSTOLIC BLOOD PRESSURE: 120 MMHG | HEART RATE: 94 BPM | TEMPERATURE: 101.3 F | BODY MASS INDEX: 34.22 KG/M2

## 2018-01-14 NOTE — RESULT NOTES
Verified Results  (1) CBC/PLT/DIFF 32VKI3755 07:15AM Brandy Aspen     Test Name Result Flag Reference   WHITE BLOOD CELL COUNT 9 6 Thousand/uL  3 8-10 8   RED BLOOD CELL COUNT 4 22 Million/uL  3 80-5 10   HEMOGLOBIN 10 5 g/dL L 11 7-15 5   HEMATOCRIT 33 1 % L 35 0-45 0   MCV 78 4 fL L 80 0-100 0   MCH 25 0 pg L 27 0-33 0   MCHC 31 8 g/dL L 32 0-36 0   RDW 17 9 % H 11 0-15 0   PLATELET COUNT 744 Thousand/uL  140-400   MPV 9 2 fL  7 5-11 5   ABSOLUTE NEUTROPHILS 6797 cells/uL  1835-2984   ABSOLUTE LYMPHOCYTES 2045 cells/uL  850-3900   ABSOLUTE MONOCYTES 653 cells/uL  200-950   ABSOLUTE EOSINOPHILS 77 cells/uL     ABSOLUTE BASOPHILS 29 cells/uL  0-200   NEUTROPHILS 70 8 %     LYMPHOCYTES 21 3 %     MONOCYTES 6 8 %     EOSINOPHILS 0 8 %     BASOPHILS 0 3 %       (1) COMPREHENSIVE METABOLIC PANEL 58XFV6989 95:39EW Brandy Aspen     Test Name Result Flag Reference   GLUCOSE 86 mg/dL  65-99   Fasting reference interval   UREA NITROGEN (BUN) 11 mg/dL  7-25   CREATININE 0 67 mg/dL  0 50-1 10   eGFR NON-AFR   AMERICAN 106 mL/min/1 73m2  > OR = 60   eGFR AFRICAN AMERICAN 123 mL/min/1 73m2  > OR = 60   BUN/CREATININE RATIO   6-73   NOT APPLICABLE (calc)   SODIUM 139 mmol/L  135-146   POTASSIUM 4 0 mmol/L  3 5-5 3   CHLORIDE 106 mmol/L     CARBON DIOXIDE 24 mmol/L  19-30   CALCIUM 8 7 mg/dL  8 6-10 2   PROTEIN, TOTAL 6 5 g/dL  6 1-8 1   ALBUMIN 3 9 g/dL  3 6-5 1   GLOBULIN 2 6 g/dL (calc)  1 9-3 7   ALBUMIN/GLOBULIN RATIO 1 5 (calc)  1 0-2 5   BILIRUBIN, TOTAL 0 5 mg/dL  0 2-1 2   ALKALINE PHOSPHATASE 85 U/L     AST 15 U/L  10-35   ALT 18 U/L  6-29     (Q) LIPID PANEL WITH REFLEX TO DIRECT LDL 29YAO4483 07:15AM Brandy Aspen     Test Name Result Flag Reference   CHOLESTEROL, TOTAL 164 mg/dL  125-200   HDL CHOLESTEROL 54 mg/dL  > OR = 46   TRIGLICERIDES 62 mg/dL  <811   LDL-CHOLESTEROL 98 mg/dL (calc)  <130   Desirable range <100 mg/dL for patients with CHD or  diabetes and <70 mg/dL for diabetic patients with  known heart disease  CHOL/HDLC RATIO 3 0 (calc)  < OR = 5 0   NON HDL CHOLESTEROL 110 mg/dL (calc)     Target for non-HDL cholesterol is 30 mg/dL higher than   LDL cholesterol target  *(Q) VITAMIN D, 25-HYDROXY, LC/MS/MS 10TGA7972 07:15AM Tish Zaragoza     Test Name Result Flag Reference   VITAMIN D, 25-OH, TOTAL 21 ng/mL L    Vitamin D Status         25-OH Vitamin D:     Deficiency:                    <20 ng/mL  Insufficiency:             20 - 29 ng/mL  Optimal:                 > or = 30 ng/mL     For 25-OH Vitamin D testing on patients on   D2-supplementation and patients for whom quantitation   of D2 and D3 fractions is required, the QuestAssureD(TM)  25-OH VIT D, (D2,D3), LC/MS/MS is recommended: order   code 49198 (patients >2yrs)  For more information on this test, go to:  http://SmartShoot/faq/DPS505  (This link is being provided for   informational/educational purposes only )     (Q) TSH, 3RD GENERATION W/REFLEX TO FT4 60XBH9074 07:15AM Tish Zaragoza   REPORT COMMENT:  2ND LAB REQ# [id number]  FASTING:YES     Test Name Result Flag Reference   TSH W/REFLEX TO FT4 1 71 mIU/L     Reference Range                         > or = 20 Years  0 40-4 50                              Pregnancy Ranges            First trimester    0 26-2 66            Second trimester   0 55-2 73            Third trimester    0 43-2 91

## 2018-01-14 NOTE — MISCELLANEOUS
Message   Recorded as Task   Date: 06/10/2016 10:59 AM, Created By: Travis Izquierdo   Task Name: Go to Result   Assigned To: Alisson Whitten   Regarding Patient: Emma Goodman, Status: In Progress   Comment:    Alice Rod - 10 Lisandro 2016 10:59 AM     TASK CREATED  ED pt - Urine shows UTI with E  coli  She was treated with Cipro  Can you please be sure she is feeling better? Vince Peter - 07 Jun 2016 11:04 AM     TASK IN PROGRESS   Shaye Schoenchen - 10 Lisandro 2016 11:06 AM     TASK EDITED  pt states she feeling better  Active Problems    1  Allergic rhinitis (477 9) (J30 9)   2  Breast nodule (793 89) (N63)   3  Cervicitis (616 0) (N72)   4  Common migraine without aura (346 10) (G43 009)   5  Depression with anxiety (300 4) (F41 8)   6  Encounter for routine gynecological examination (V72 31) (Z01 419)   7  Encounter for screening for human papillomavirus (HPV) (V73 81) (Z11 51)   8  Encounter for screening mammogram for breast cancer (V76 12) (Z12 31)   9  Fatigue (780 79) (R53 83)   10  Iron deficiency anemia, unspecified iron deficiency anemia type (280 9) (D50 9)   11  Menorrhagia (626 2) (N92 0)   12  Obesity (278 00) (E66 9)   13  Recurrent UTI (599 0) (N39 0)   14  Vitamin D deficiency (268 9) (E55 9)   15  Well adult exam (V70 0) (Z00 00)    Current Meds   1  Ferrous Sulfate 325 (65 Fe) MG Oral Tablet Delayed Release; Take 1 tablet daily; Therapy: 28OQG1886 to (Daphne Ou)  Requested for: 27HRD4117; Last   Rx:08Jun2016 Ordered   2  Naproxen 500 MG Oral Tablet; TAKE 1 TABLET EVERY 12 HOURS WITH FOOD; Therapy: 03TSL0620 to ([facility])  Requested for: 13Apr2016; Last   Rx:13Apr2016 Ordered   3  Vitamin D3 5000 UNIT Oral Capsule; take 1 capsule daily; Therapy: 82DEP4427 to (Last Rx:08Jun2016)  Requested for: 24BLO1862 Ordered   4  Zyrtec 10 MG TABS; Therapy: (Recorded:13Apr2016) to Recorded    Allergies    1  Penicillins   2  Red Dye    3   Mold    Signatures   Electronically signed by : Mk Bhardwaj LPN; Lisandro 10 [id number] 38:14DZ EST                       (Author)

## 2018-01-15 NOTE — MISCELLANEOUS
Message   Recorded as Task   Date: 08/22/2016 09:23 AM, Created By: Celia Pattersno   Task Name: Call Back   Assigned To: Chencho Pena   Regarding Patient: Pat Anders, Status: In Progress   Comment:    Belkys Christie - 22 Aug 2016 9:23 AM     TASK CREATED  PT CALLED C/O UTI SX  SHE USES [facility] ON [address]  HER # [phone]   Kalli Eaton - 22 Aug 2016 9:40 AM     TASK IN PROGRESS   Kalli Eaton - 22 Aug 2016 9:45 AM     TASK EDITED                 sat night started with pressure, frequency, burning while urinating, [facility] [address]  sent for UA C&S  macrobid 100mg BID x 7 days  Kalli Eaton - 22 Aug 2016 9:52 AM     TASK EDITED                 addendum: patient is allergic to macrobid rx for cipro 500 twice daily for 3 days sent to pharmacy  not to be started until after urine given  Active Problems    1  Allergic rhinitis (477 9) (J30 9)   2  Breast nodule (793 89) (N63)   3  Cervicitis (616 0) (N72)   4  Common migraine without aura (346 10) (G43 009)   5  Depression with anxiety (300 4) (F41 8)   6  Encounter for routine gynecological examination (V72 31) (Z01 419)   7  Encounter for screening for human papillomavirus (HPV) (V73 81) (Z11 51)   8  Encounter for screening mammogram for breast cancer (V76 12) (Z12 31)   9  Fatigue (780 79) (R53 83)   10  Iron deficiency anemia, unspecified iron deficiency anemia type (280 9) (D50 9)   11  Menorrhagia (626 2) (N92 0)   12  Obesity (278 00) (E66 9)   13  Recurrent UTI (599 0) (N39 0)   14  Vitamin D deficiency (268 9) (E55 9)   15  Well adult exam (V70 0) (Z00 00)    Allergies    1  Penicillins   2  Red Dye    3  Mold    Signatures   Electronically signed by : Bob Son LPN;  Aug 22 2016  9:53AM EST                       (Author)

## 2018-01-15 NOTE — RESULT NOTES
Verified Results  US KIDNEY AND BLADDER [id number] 03:32PM Noah Miranda Order Number: [id number]    - Patient Instructions: To schedule this appointment, please contact Central Scheduling at [phone]  Test Name Result Flag Reference   US KIDNEY AND BLADDER (Report)     RENAL ULTRASOUND     INDICATION: Low back pain  Right flank pain for a week  COMPARISON: CT abdomen pelvis 8/25/2017     TECHNIQUE:  Ultrasound of the retroperitoneum was performed with a curvilinear transducer utilizing volumetric sweeps and still imaging techniques  FINDINGS:     KIDNEYS:   Symmetric and normal size  Right kidney: 12 1 x 5 5 cm  Normal echogenicity and contour  No suspicious masses detected  No hydronephrosis  No shadowing calculi  No perinephric fluid collections  Left kidney: 12 2 x 5 4 cm  Normal echogenicity and contour  No suspicious masses detected  No hydronephrosis  No shadowing calculi  No perinephric fluid collections  URETERS:   Nonvisualized  BLADDER:    Normally distended  No focal thickening or mass lesions  Bilateral ureteral jets detected  IMPRESSION:     Normal         Workstation performed: [id number]     Signed by:    Gabrielle Xie MD   8/30/17

## 2018-01-15 NOTE — RESULT NOTES
Verified Results  (Q) CULTURE, URINE, SPECIAL 12Mpf0114 12:00AM Prince Haynes     Test Name Result Flag Reference   CULTURE, URINE, SPECIAL  A    CULTURE, URINE, SPECIAL         MICRO NUMBER:      [id number]    TEST STATUS:       FINAL    SPECIMEN SOURCE:   URINE    SPECIMEN QUALITY:  ADEQUATE    RESULT:            10,000-50,000 CFU/mL of Gram positive cocci isolated                       multiple morphologic types  COMMENT:           May represent colonizers from external and                       internal genitalia  No further testing (including                       susceptibility) will be performed

## 2018-01-16 NOTE — RESULT NOTES
Message  Spoke with patient regarding blood work results  Lipid panel WNL  CMP WNL  TSH WNL  Vitamin D low at 24- will start Vitamin D supplement 5,000 IU once daily  CBC showed iron deficiency anemia- will start Ferrous sulfate 325mg once daily  She denied any blood in stool, emesis, sputum  Does report menorrhagia- has a GYN appointment in 9/2016 with Dr Christal Wu  I advised her to try and move this appointment up to discuss her menorrhagia  We will recheck labs in 2 months  We will mail her this slip  Verified Results  (1) CBC/PLT/DIFF [id number] 07:15AM [facility]     Test Name Result Flag Reference   WHITE BLOOD CELL COUNT 9 6 Thousand/uL  3 8-10 8   RED BLOOD CELL COUNT 4 22 Million/uL  3 80-5 10   HEMOGLOBIN 10 5 g/dL L 11 7-15 5   HEMATOCRIT 33 1 % L 35 0-45 0   MCV 78 4 fL L 80 0-100 0   MCH 25 0 pg L 27 0-33 0   MCHC 31 8 g/dL L 32 0-36 0   RDW 17 9 % H 11 0-15 0   PLATELET COUNT 169 Thousand/uL  140-400   MPV 9 2 fL  7 5-11 5   ABSOLUTE NEUTROPHILS 6797 cells/uL  7195-9953   ABSOLUTE LYMPHOCYTES 2045 cells/uL  850-3900   ABSOLUTE MONOCYTES 653 cells/uL  200-950   ABSOLUTE EOSINOPHILS 77 cells/uL     ABSOLUTE BASOPHILS 29 cells/uL  0-200   NEUTROPHILS 70 8 %     LYMPHOCYTES 21 3 %     MONOCYTES 6 8 %     EOSINOPHILS 0 8 %     BASOPHILS 0 3 %       (1) COMPREHENSIVE METABOLIC PANEL 14RZA8812 70:83UR [facility]     Test Name Result Flag Reference   GLUCOSE 86 mg/dL  65-99   Fasting reference interval   UREA NITROGEN (BUN) 11 mg/dL  7-25   CREATININE 0 67 mg/dL  0 50-1 10   eGFR NON-AFR   AMERICAN 106 mL/min/1 73m2  > OR = 60   eGFR AFRICAN AMERICAN 123 mL/min/1 73m2  > OR = 60   BUN/CREATININE RATIO   8-90   NOT APPLICABLE (calc)   SODIUM 139 mmol/L  135-146   POTASSIUM 4 0 mmol/L  3 5-5 3   CHLORIDE 106 mmol/L     CARBON DIOXIDE 24 mmol/L  19-30   CALCIUM 8 7 mg/dL  8 6-10 2   PROTEIN, TOTAL 6 5 g/dL  6 1-8 1   ALBUMIN 3 9 g/dL  3 6-5 1   GLOBULIN 2 6 g/dL (calc)  1 9-3 7   ALBUMIN/GLOBULIN RATIO 1 5 (calc)  1 0-2 5   BILIRUBIN, TOTAL 0 5 mg/dL  0 2-1 2   ALKALINE PHOSPHATASE 85 U/L     AST 15 U/L  10-35   ALT 18 U/L  6-29     (Q) LIPID PANEL WITH REFLEX TO DIRECT LDL [id number] 07:15AM Ranjana Sanchez     Test Name Result Flag Reference   CHOLESTEROL, TOTAL 164 mg/dL  125-200   HDL CHOLESTEROL 54 mg/dL  > OR = 46   TRIGLICERIDES 62 mg/dL  <941   LDL-CHOLESTEROL 98 mg/dL (calc)  <130   Desirable range <100 mg/dL for patients with CHD or  diabetes and <70 mg/dL for diabetic patients with  known heart disease  CHOL/HDLC RATIO 3 0 (calc)  < OR = 5 0   NON HDL CHOLESTEROL 110 mg/dL (calc)     Target for non-HDL cholesterol is 30 mg/dL higher than   LDL cholesterol target  *(Q) VITAMIN D, 25-HYDROXY, LC/MS/MS 01CPV1052 07:15AM Ranjana Dsouzaabimbola     Test Name Result Flag Reference   VITAMIN D, 25-OH, TOTAL 21 ng/mL L    Vitamin D Status         25-OH Vitamin D:     Deficiency:                    <20 ng/mL  Insufficiency:             20 - 29 ng/mL  Optimal:                 > or = 30 ng/mL     For 25-OH Vitamin D testing on patients on   D2-supplementation and patients for whom quantitation   of D2 and D3 fractions is required, the QuestAssureD(TM)  25-OH VIT D, (D2,D3), LC/MS/MS is recommended: order   code 86740 (patients >2yrs)  For more information on this test, go to:  http://education  LOFTY/faq/SMO622  (This link is being provided for   informational/educational purposes only )     (Q) TSH, 3RD GENERATION W/REFLEX TO FT4 [id number] 07:15AM Ranjana Sanchez   REPORT COMMENT:  2ND LAB REQ# [id number]  FASTING:YES     Test Name Result Flag Reference   TSH W/REFLEX TO FT4 1 71 mIU/L     Reference Range                         > or = 20 Years  0 40-4 50                              Pregnancy Ranges            First trimester    0 26-2 66            Second trimester   0 55-2 73            Third trimester    0 43-2 91 Signatures   Electronically signed by : LUZ Antoine; Jun 8 2016 12:11PM EST                       (Author)

## 2018-01-16 NOTE — MISCELLANEOUS
Message  Return to work or school:   Jolly Calle is under my professional care  She was seen in my office on 4/13/16   She is able to return to work on  4/13/16       John ESCOBAR  Signatures   Electronically signed by : LUZ Agosto;  Apr 13 2016  8:52AM EST                       (Author)

## 2018-01-23 ENCOUNTER — LAB CONVERSION - ENCOUNTER (OUTPATIENT)
Dept: OTHER | Facility: OTHER | Age: 48
End: 2018-01-23

## 2018-01-23 ENCOUNTER — GENERIC CONVERSION - ENCOUNTER (OUTPATIENT)
Dept: OTHER | Facility: OTHER | Age: 48
End: 2018-01-23

## 2018-01-23 VITALS
SYSTOLIC BLOOD PRESSURE: 100 MMHG | HEIGHT: 61 IN | WEIGHT: 189.25 LBS | TEMPERATURE: 98.8 F | BODY MASS INDEX: 35.73 KG/M2 | RESPIRATION RATE: 16 BRPM | OXYGEN SATURATION: 97 % | DIASTOLIC BLOOD PRESSURE: 72 MMHG | HEART RATE: 90 BPM

## 2018-01-23 LAB
25(OH)D3 SERPL-MCNC: 24 NG/ML (ref 30–100)
25(OH)D3 SERPL-MCNC: 24 NG/ML (ref 30–100)
A/G RATIO (HISTORICAL): 1.4 (CALC) (ref 1–2.5)
ALBUMIN SERPL BCP-MCNC: 3.9 G/DL (ref 3.6–5.1)
ALBUMIN SERPL-MCNC: 3.9 G/DL (ref 3.6–5.1)
ALBUMIN/GLOB SERPL: 1.4 (CALC) (ref 1–2.5)
ALP SERPL-CCNC: 77 U/L (ref 33–115)
ALP SERPL-CCNC: 77 U/L (ref 33–115)
ALT SERPL W P-5'-P-CCNC: 14 U/L (ref 6–29)
ALT SERPL-CCNC: 14 U/L (ref 6–29)
ANA SER QL IF: NEGATIVE
ANTI-NUCLEAR ANTIBODY (ANA) (HISTORICAL): NEGATIVE
AST SERPL W P-5'-P-CCNC: 14 U/L (ref 10–35)
AST SERPL-CCNC: 14 U/L (ref 10–35)
B BURGDOR AB SER IA-ACNC: <0.9 INDEX
BASOPHILS # BLD AUTO: 0.5 %
BASOPHILS # BLD AUTO: 38 CELLS/UL (ref 0–200)
BASOPHILS # BLD AUTO: 38 CELLS/UL (ref 0–200)
BASOPHILS NFR BLD AUTO: 0.5 %
BILIRUB SERPL-MCNC: 0.4 MG/DL (ref 0.2–1.2)
BILIRUB SERPL-MCNC: 0.4 MG/DL (ref 0.2–1.2)
BLASTS # BLD: ABNORMAL CELLS/UL
BLASTS NFR BLD MANUAL: ABNORMAL %
BUN SERPL-MCNC: 12 MG/DL (ref 7–25)
BUN SERPL-MCNC: 12 MG/DL (ref 7–25)
BUN/CREA RATIO (HISTORICAL): NORMAL (CALC) (ref 6–22)
BUN/CREAT SERPL: NORMAL (CALC) (ref 6–22)
C-REACTIVE PROTEIN (HISTORICAL): 7 MG/L
CALCIUM SERPL-MCNC: 9 MG/DL (ref 8.6–10.2)
CALCIUM SERPL-MCNC: 9 MG/DL (ref 8.6–10.2)
CHLORIDE SERPL-SCNC: 106 MMOL/L (ref 98–110)
CHLORIDE SERPL-SCNC: 106 MMOL/L (ref 98–110)
CO2 SERPL-SCNC: 27 MMOL/L (ref 20–31)
CO2 SERPL-SCNC: 27 MMOL/L (ref 20–31)
CREAT SERPL-MCNC: 0.7 MG/DL (ref 0.5–1.1)
CREAT SERPL-MCNC: 0.7 MG/DL (ref 0.5–1.1)
CRP SERPL-MCNC: 7 MG/L
DEPRECATED RDW RBC AUTO: 15.1 % (ref 11–15)
EGFR AFRICAN AMERICAN (HISTORICAL): 120 ML/MIN/1.73M2
EGFR-AMERICAN CALC (HISTORICAL): 104 ML/MIN/1.73M2
EOSINOPHIL # BLD AUTO: 158 CELLS/UL (ref 15–500)
EOSINOPHIL # BLD AUTO: 158 CELLS/UL (ref 15–500)
EOSINOPHIL # BLD AUTO: 2.1 %
EOSINOPHIL NFR BLD AUTO: 2.1 %
ERYTHROCYTE SEDIMENTATION RATE (HISTORICAL): 11 MM/H
ERYTHROCYTE [DISTWIDTH] IN BLOOD BY AUTOMATED COUNT: 15.1 % (ref 11–15)
ERYTHROCYTE [SEDIMENTATION RATE] IN BLOOD BY WESTERGREN METHOD: 11 MM/H
FERRITIN SERPL-MCNC: 7 NG/ML (ref 10–232)
FERRITIN SERPL-MCNC: 7 NG/ML (ref 10–232)
GAMMA GLOBULIN (HISTORICAL): 2.8 G/DL (CALC) (ref 1.9–3.7)
GLOBULIN SER CALC-MCNC: 2.8 G/DL (CALC) (ref 1.9–3.7)
GLUCOSE (HISTORICAL): 94 MG/DL (ref 65–99)
GLUCOSE SERPL-MCNC: 94 MG/DL (ref 65–99)
HCT VFR BLD AUTO: 37.9 % (ref 35–45)
HCT VFR BLD AUTO: 37.9 % (ref 35–45)
HGB BLD-MCNC: 12.2 G/DL (ref 11.7–15.5)
HGB BLD-MCNC: 12.2 G/DL (ref 11.7–15.5)
IRON SERPL-MCNC: 42 MCG/DL (ref 40–190)
IRON SERPL-MCNC: 42 MCG/DL (ref 40–190)
LYME IGG/IGM AB (HISTORICAL): <0.9 INDEX
LYMPHOCYTES # BLD AUTO: 2198 CELLS/UL (ref 850–3900)
LYMPHOCYTES # BLD AUTO: 2198 CELLS/UL (ref 850–3900)
LYMPHOCYTES # BLD AUTO: 29.3 %
LYMPHOCYTES NFR BLD AUTO: 29.3 %
MCH RBC QN AUTO: 26.8 PG (ref 27–33)
MCH RBC QN AUTO: 26.8 PG (ref 27–33)
MCHC RBC AUTO-ENTMCNC: 32.2 G/DL (ref 32–36)
MCHC RBC AUTO-ENTMCNC: 32.2 G/DL (ref 32–36)
MCV RBC AUTO: 83.3 FL (ref 80–100)
MCV RBC AUTO: 83.3 FL (ref 80–100)
METAMYELOCYTES # BLD: ABNORMAL CELLS/UL
METAMYELOCYTES NFR BLD MANUAL: ABNORMAL %
MONOCYTES # BLD AUTO: 623 CELLS/UL (ref 200–950)
MONOCYTES # BLD AUTO: 623 CELLS/UL (ref 200–950)
MONOCYTES (HISTORICAL): 8.3 %
MONOCYTES NFR BLD AUTO: 8.3 %
MYELOCYTES # BLD: ABNORMAL CELLS/UL
MYELOCYTES NFR BLD MANUAL: ABNORMAL %
NEUTROPHILS # BLD AUTO: 4485 CELLS/UL (ref 1500–7800)
NEUTROPHILS # BLD AUTO: 4485 CELLS/UL (ref 1500–7800)
NEUTROPHILS # BLD AUTO: 59.8 %
NEUTROPHILS NFR BLD AUTO: 59.8 %
NEUTS BAND # BLD: ABNORMAL CELLS/UL (ref 0–750)
NEUTS BAND NFR BLD MANUAL: ABNORMAL %
NRBC # BLD: ABNORMAL CELLS/UL
NRBC BLD-RTO: ABNORMAL /100 WBC
PLATELET # BLD AUTO: 254 THOUSAND/UL (ref 140–400)
PLATELET # BLD AUTO: 254 THOUSAND/UL (ref 140–400)
PMV BLD AUTO: 11.8 FL (ref 7.5–12.5)
PMV BLD REES-ECKER: 11.8 FL (ref 7.5–12.5)
POTASSIUM SERPL-SCNC: 3.9 MMOL/L (ref 3.5–5.3)
POTASSIUM SERPL-SCNC: 3.9 MMOL/L (ref 3.5–5.3)
PROMYELOCYTES # BLD: ABNORMAL CELLS/UL
PROMYELOCYTES NFR BLD MANUAL: ABNORMAL %
PROT SERPL-MCNC: 6.7 G/DL (ref 6.1–8.1)
RBC # BLD AUTO: 4.55 MILLION/UL (ref 3.8–5.1)
RBC # BLD AUTO: 4.55 MILLION/UL (ref 3.8–5.1)
SERVICE CMNT-IMP: ABNORMAL
SL AMB EGFR AFRICAN AMERICAN: 120 ML/MIN/1.73M2
SL AMB EGFR NON AFRICAN AMERICAN: 104 ML/MIN/1.73M2
SODIUM SERPL-SCNC: 141 MMOL/L (ref 135–146)
SODIUM SERPL-SCNC: 141 MMOL/L (ref 135–146)
T4 FREE SERPL-MCNC: 1.1 NG/DL (ref 0.8–1.8)
T4 FREE SERPL-MCNC: 1.1 NG/DL (ref 0.8–1.8)
TOTAL PROTEIN (HISTORICAL): 6.7 G/DL (ref 6.1–8.1)
TSH SERPL DL<=0.05 MIU/L-ACNC: 1.87 MIU/L
TSH SERPL-ACNC: 1.87 MIU/L
VARIANT LYMPHS NFR BLD: ABNORMAL % (ref 0–10)
WBC # BLD AUTO: 7.5 THOUSAND/UL (ref 3.8–10.8)
WBC # BLD AUTO: 7.5 THOUSAND/UL (ref 3.8–10.8)

## 2018-01-24 ENCOUNTER — TELEPHONE (OUTPATIENT)
Dept: FAMILY MEDICINE CLINIC | Facility: CLINIC | Age: 48
End: 2018-01-24

## 2018-01-24 NOTE — RESULT NOTES
Verified Results  (Q) CBC (INCLUDES DIFF/PLT) (REFL) [id number] 09:15AM [place]     Test Name Result Flag Reference   WHITE BLOOD CELL COUNT 7 5 Thousand/uL  3 8-10 8   RED BLOOD CELL COUNT 4 55 Million/uL  3 80-5 10   HEMOGLOBIN 12 2 g/dL  11 7-15 5   HEMATOCRIT 37 9 %  35 0-45 0   MCV 83 3 fL  80 0-100 0   MCH 26 8 pg L 27 0-33 0   MCHC 32 2 g/dL  32 0-36 0   RDW 15 1 % H 11 0-15 0   PLATELET COUNT 299 Thousand/uL  140-400   MPV 11 8 fL  7 5-12 5   ABSOLUTE NEUTROPHILS 4485 cells/uL  4381-3173   ABSOLUTE LYMPHOCYTES 2198 cells/uL  850-3900   ABSOLUTE MONOCYTES 623 cells/uL  200-950   ABSOLUTE EOSINOPHILS 158 cells/uL     ABSOLUTE BASOPHILS 38 cells/uL  0-200   NEUTROPHILS 59 8 %     LYMPHOCYTES 29 3 %     MONOCYTES 8 3 %     EOSINOPHILS 2 1 %     BASOPHILS 0 5 %       (1) COMPREHENSIVE METABOLIC PANEL [id number] 27:29IX [place]     Test Name Result Flag Reference   GLUCOSE 94 mg/dL  65-99   Fasting reference interval   UREA NITROGEN (BUN) 12 mg/dL  7-25   CREATININE 0 70 mg/dL  0 50-1 10   eGFR NON-AFR   AMERICAN 104 mL/min/1 73m2  > OR = 60   eGFR AFRICAN AMERICAN 120 mL/min/1 73m2  > OR = 60   BUN/CREATININE RATIO   2-96   NOT APPLICABLE (calc)   SODIUM 141 mmol/L  135-146   POTASSIUM 3 9 mmol/L  3 5-5 3   CHLORIDE 106 mmol/L     CARBON DIOXIDE 27 mmol/L  20-31   CALCIUM 9 0 mg/dL  8 6-10 2   PROTEIN, TOTAL 6 7 g/dL  6 1-8 1   ALBUMIN 3 9 g/dL  3 6-5 1   GLOBULIN 2 8 g/dL (calc)  1 9-3 7   ALBUMIN/GLOBULIN RATIO 1 4 (calc)  1 0-2 5   BILIRUBIN, TOTAL 0 4 mg/dL  0 2-1 2   ALKALINE PHOSPHATASE 77 U/L     AST 14 U/L  10-35   ALT 14 U/L  6-29     (Q) TSH, 3RD GENERATION [id number] 09:15AM [place]     Test Name Result Flag Reference   TSH 1 87 mIU/L     Reference Range                         > or = 20 Years  0 40-4 50                              Pregnancy Ranges            First trimester    0 26-2 66            Second trimester   0 55-2 73            Third trimester    0 43-2 91     (1) T4, FREE [id number] 09:15AM Sanju[facility] Ch     Test Name Result Flag Reference   T4, FREE 1 1 ng/dL  0 8-1 8     *(Q) VITAMIN D, 25-HYDROXY, LC/MS/MS [id number] 09:15AM [facility]ilar   REPORT COMMENT:  FASTING:YES     Test Name Result Flag Reference   VITAMIN D, 25-OH, TOTAL 24 ng/mL L    Vitamin D Status         25-OH Vitamin D:     Deficiency:                    <20 ng/mL  Insufficiency:             20 - 29 ng/mL  Optimal:                 > or = 30 ng/mL     For 25-OH Vitamin D testing on patients on   D2-supplementation and patients for whom quantitation   of D2 and D3 fractions is required, the QuestAssureD(TM)  25-OH VIT D, (D2,D3), LC/MS/MS is recommended: order   code 58303 (patients >2yrs)  For more information on this test, go to:  http://[employer]/faq/FZC893  (This link is being provided for   informational/educational purposes only )     (Q) LYME DISEASE AB, TOTAL W/REFL WB (IGG, IGM) [id number] 09:15AM Sanju[facility]ita Ch     Test Name Result Flag Reference   LYME AB SCREEN <0 90 index     Index                Interpretation                     -----                --------------                     < 0 90               Negative                     0  90-1 09            Equivocal                     > 1 09               Positive      As recommended by the Food and Drug Administration   (FDA), all samples with positive or equivocal   results in a Borrelia burgdorferi antibody screen  will be tested using a blot method  Positive or   equivocal screening test results should not be   interpreted as truly positive until verified as such   using a supplemental assay (e g , B  burgdorferi blot)  The screening test and/or blot for B  burgdorferi   antibodies may be falsely negative in early stages  of Lyme disease, including the period when erythema   migrans is apparent       (Q) KEM SCREEN, IFA, WITH REFLEX TO TITER AND PATTERN [id number] 09: 15AM [facility]     Test Name Result Flag Reference   KEM SCREEN, IFA NEGATIVE  NEGATIVE   Although the specimen was negative for anti-  nuclear antibodies (KEM), the presence of  cytoplasmic fluorescence was noted on the  HEp-2 slide  Other reactivities (e g , anti-  mitochondrial antibodies or anti-smooth muscle  antibodies) may be responsible for this  fluorescence  KEM IFA is a first line screen for detecting the  presence of up to approximately 150 autoantibodies in  various autoimmune diseases  A negative KEM IFA result  suggests KEM-associated autoimmune diseases are not  present at this time  Visit Physician FAQs for interpretation of all  antibodies in the [place], prevalence, and association  with diseases at http://[employer]/  faq/NFE168     (Q) SED RATE BY MODIFIED WESTERGREN [id number] 09:15AM [facility]     Test Name Result Flag Reference   SED RATE BY MODIFIED$WESTERGREN 11 mm/h  < OR = 20     (1) C-REACTIVE PROTEIN [id number] 09:15AM [facility]     Test Name Result Flag Reference   C-REACTIVE PROTEIN 7 0 mg/L  <8 0     (1) IRON [id number] 09:15AM [facility]     Test Name Result Flag Reference   IRON, TOTAL 42 mcg/dL       (1) OP LAURA FERRITIN [id number] 09:15AM [facility]     Test Name Result Flag Reference   FERRITIN 7 ng/mL L        Plan  Iron deficiency anemia, unspecified iron deficiency anemia type    · Ferrous Sulfate 325 (65 Fe) MG Oral Tablet; Take 1 tab  Daily   · (1) CBC/PLT/DIFF; Status:Active; Requested for:23Apr2018;    · (1) FERRITIN; Status:Active; Requested for:23Apr2018;    · (1) IRON; Status:Active;  Requested for:23Apr2018;

## 2018-01-24 NOTE — TELEPHONE ENCOUNTER
----- Message from Vic Zambrano MD sent at 1/24/2018  9:45 AM EST -----  Please call patient to take OTC Vit D 5000 IU daily due to low Vit D level

## 2018-01-30 ENCOUNTER — OFFICE VISIT (OUTPATIENT)
Dept: OBGYN CLINIC | Facility: CLINIC | Age: 48
End: 2018-01-30
Payer: COMMERCIAL

## 2018-01-30 VITALS
BODY MASS INDEX: 36.91 KG/M2 | SYSTOLIC BLOOD PRESSURE: 112 MMHG | HEIGHT: 60 IN | WEIGHT: 188 LBS | DIASTOLIC BLOOD PRESSURE: 58 MMHG

## 2018-01-30 DIAGNOSIS — Z11.51 ENCOUNTER FOR SCREENING FOR HUMAN PAPILLOMAVIRUS (HPV): ICD-10-CM

## 2018-01-30 DIAGNOSIS — Z12.31 ENCOUNTER FOR SCREENING MAMMOGRAM FOR MALIGNANT NEOPLASM OF BREAST: ICD-10-CM

## 2018-01-30 DIAGNOSIS — N90.7 SEBACEOUS CYST OF LABIA: ICD-10-CM

## 2018-01-30 DIAGNOSIS — Z12.4 CERVICAL CANCER SCREENING: ICD-10-CM

## 2018-01-30 DIAGNOSIS — Z01.419 ENCOUNTER FOR GYNECOLOGICAL EXAMINATION (GENERAL) (ROUTINE) WITHOUT ABNORMAL FINDINGS: Primary | ICD-10-CM

## 2018-01-30 PROCEDURE — 99396 PREV VISIT EST AGE 40-64: CPT | Performed by: NURSE PRACTITIONER

## 2018-01-30 PROCEDURE — 87624 HPV HI-RISK TYP POOLED RSLT: CPT | Performed by: NURSE PRACTITIONER

## 2018-01-30 PROCEDURE — G0145 SCR C/V CYTO,THINLAYER,RESCR: HCPCS | Performed by: NURSE PRACTITIONER

## 2018-01-30 RX ORDER — BIOTIN 1 MG
1 TABLET ORAL DAILY
COMMUNITY
Start: 2016-06-08 | End: 2018-07-24 | Stop reason: ALTCHOICE

## 2018-01-30 RX ORDER — CETIRIZINE HYDROCHLORIDE 10 MG/1
10 TABLET ORAL AS NEEDED
COMMUNITY

## 2018-01-30 NOTE — PROGRESS NOTES
I have reviewed the notes, assessments, and/or procedures performed by Segun Thurston, I concur with her/his documentation of Jes Palomino

## 2018-01-30 NOTE — ASSESSMENT & PLAN NOTE
Posterior right majora/perineum with sebaceous cyst present  Pt reports this started as a folliculitis and has been flaring recurrently  On today's exam this is intact and small, without features of abscess or visible communicating sinus; evidence of scarring is visible  Advised expectant management - recommended application of warm compress with epsom salt with next recurrence; rx provided for mupirocin for PRN use  Reviewed antibacterial hygiene recommendations  F/u PRN with Derm if excision is desired

## 2018-01-30 NOTE — PATIENT INSTRUCTIONS
Cyst   WHAT YOU NEED TO KNOW:   A cyst is a round, firm lump found almost anywhere on your body  Cysts may grow slowly but are not cancerous  Treatment is not needed if you have no symptoms  Cysts can be opened and drained if they become infected or cause problems  Cysts can grow larger and make it hard for you to do your daily activities  You may also need antibiotics if there is an infection  You may need surgery to remove the cyst completely  DISCHARGE INSTRUCTIONS:   Medicines:   · Antibiotics  may be given to treat or prevent an infection  · Take your medicine as directed  Contact your healthcare provider if you think your medicine is not helping or if you have side effects  Tell him of her if you are allergic to any medicine  Keep a list of the medicines, vitamins, and herbs you take  Include the amounts, and when and why you take them  Bring the list or the pill bottles to follow-up visits  Carry your medicine list with you in case of an emergency  Return to the emergency department if:   · You develop a fever  · The area around your cyst becomes swollen, red, and painful  · Your cyst continues to drain for 2 days after you start antibiotics  Care for your wound as directed: If you have had your cyst drained or removed, care for your wound as directed  Carefully wash the wound with soap and water  Dry the area and put on new, clean bandages as directed  Change your bandages when they get wet or dirty  Follow up with your healthcare provider as directed: Your wound may need to be checked if your cyst was removed in the emergency department  You may need to see a surgeon if your cyst could not be removed  Write down your questions so you remember to ask during your visits  © 2017 [employer]  Information is for End User's use only and may not be sold, redistributed or otherwise used for commercial purposes   All illustrations and images included in CareNotes® are the copyrighted property of [employer]  or [employer]  The above information is an  only  It is not intended as medical advice for individual conditions or treatments  Talk to your doctor, nurse or pharmacist before following any medical regimen to see if it is safe and effective for you

## 2018-01-30 NOTE — PROGRESS NOTES
Assessment/Plan:    Encounter for gynecological examination (general) (routine) without abnormal findings  Normal findings on routine gyn exam  Advised monthly SBE, annual CBE and annual screening mammo  Reviewed ASCCP guidelines and recommended pap with cotesting q3 yrs for this low risk patient; pap with cotesting was collected todat  STI testing was offered and the patient declines; she reports low risk  The patient has BTL for contraceptive method  Diet/activity recommendations  RTO in one year or sooner PRN  Sebaceous cyst of labia  Posterior right majora/perineum with sebaceous cyst present  Pt reports this started as a folliculitis and has been flaring recurrently  On today's exam this is intact and small, without features of abscess or visible communicating sinus; evidence of scarring is visible  Advised expectant management - recommended application of warm compress with epsom salt with next recurrence; rx provided for mupirocin for PRN use  Reviewed antibacterial hygiene recommendations  F/u PRN with Derm if excision is desired  Diagnoses and all orders for this visit:    Encounter for gynecological examination (general) (routine) without abnormal findings    Encounter for screening mammogram for malignant neoplasm of breast  -     Mammo screening bilateral w cad; Future    Encounter for screening for human papillomavirus (HPV)  -     Liquid-based pap, screening    Cervical cancer screening  -     Liquid-based pap, screening    Sebaceous cyst of labia  -     mupirocin (BACTROBAN) 2 % ointment; Apply topically 3 (three) times a day    Other orders  -     Cholecalciferol (VITAMIN D3) 1000 units CAPS; Take 1 capsule by mouth daily  -     cetirizine (ZYRTEC ALLERGY) 10 mg tablet; Take by mouth          Subjective:      Patient ID: Tatiana Catherine is a 56 y o  female  This patient presents for routine annual gyn exam    Medically stable  Right ovary surgically absent for benign indications     C/o recurrent ingrown hair near right perineum  Not currently bothersome  Started after shaving this area  She denies acute gyn complaints otherwise  Menses are regular, q18d and light to mod; she denies features of menstrual dysfunction otherwise, recent TSH was WNL  She denies pelvic pain, breast concerns, abnormal discharge, bowel/bladder dysfunction, depression/anxiety  Long term monogamous  She denies STI concerns  BTL for contraception  Works in admissions at [employer]  Kids are well  Gynecologic Exam         The following portions of the patient's history were reviewed and updated as appropriate: allergies, current medications, past family history, past medical history, past social history, past surgical history and problem list     Review of Systems   Constitutional: Negative  HENT: Negative  Eyes: Negative  Respiratory: Negative  Cardiovascular: Negative  Gastrointestinal: Negative  Endocrine: Negative  Genitourinary: Negative  Musculoskeletal: Negative  Skin: Negative  Ingrown hair in groin   Allergic/Immunologic: Negative  Neurological: Negative  Hematological: Negative  Psychiatric/Behavioral: Negative  Objective:     Physical Exam   Constitutional: She is oriented to person, place, and time  She appears well-developed and well-nourished  HENT:   Head: Normocephalic and atraumatic  Eyes: EOM are normal  Pupils are equal, round, and reactive to light  Neck: Normal range of motion  Neck supple  No thyromegaly present  Cardiovascular: Normal rate, regular rhythm and normal heart sounds  Pulmonary/Chest: Effort normal and breath sounds normal  No respiratory distress  She has no wheezes  She has no rales  She exhibits no mass, no tenderness and no deformity  Right breast exhibits no inverted nipple, no mass, no nipple discharge, no skin change and no tenderness   Left breast exhibits no inverted nipple, no mass, no nipple discharge, no skin change and no tenderness  Breasts are symmetrical    Abdominal: Soft  She exhibits no distension and no mass  There is no splenomegaly or hepatomegaly  There is no tenderness  There is no rebound and no guarding  Hernia confirmed negative in the right inguinal area and confirmed negative in the left inguinal area  Genitourinary: Vagina normal and uterus normal  Rectal exam shows no external hemorrhoid and no fissure  No breast swelling, tenderness or discharge  No labial fusion  There is no rash, tenderness, lesion or injury on the right labia  There is no rash, tenderness, lesion or injury on the left labia  Cervix exhibits no motion tenderness, no discharge and no friability  Left adnexum displays no mass, no tenderness and no fullness  No erythema, tenderness or bleeding in the vagina  No foreign body in the vagina  No vaginal discharge found  Genitourinary Comments: Normal vagina, uterus and ovaries bilaterally    Musculoskeletal: Normal range of motion  Lymphadenopathy:     She has no cervical adenopathy  She has no axillary adenopathy  Right: No inguinal adenopathy present  Left: No inguinal adenopathy present  Neurological: She is alert and oriented to person, place, and time  No cranial nerve deficit  Skin: Skin is warm and dry  No rash noted  No cyanosis  Nails show no clubbing  Psychiatric: She has a normal mood and affect   Her speech is normal and behavior is normal  Judgment and thought content normal  Cognition and memory are normal

## 2018-01-30 NOTE — ASSESSMENT & PLAN NOTE
Normal findings on routine gyn exam  Advised monthly SBE, annual CBE and annual screening mammo  Reviewed ASCCP guidelines and recommended pap with cotesting q3 yrs for this low risk patient; pap with cotesting was collected todat  STI testing was offered and the patient declines; she reports low risk  The patient has BTL for contraceptive method  Diet/activity recommendations  RTO in one year or sooner PRN

## 2018-02-02 LAB
HPV RRNA GENITAL QL NAA+PROBE: NORMAL
LAB AP GYN PRIMARY INTERPRETATION: NORMAL
LAB AP LMP: NORMAL
Lab: NORMAL

## 2018-03-23 ENCOUNTER — HOSPITAL ENCOUNTER (OUTPATIENT)
Dept: MAMMOGRAPHY | Facility: HOSPITAL | Age: 48
Discharge: HOME | End: 2018-03-23
Payer: COMMERCIAL

## 2018-03-23 DIAGNOSIS — Z12.31 ENCOUNTER FOR SCREENING MAMMOGRAM FOR MALIGNANT NEOPLASM OF BREAST: ICD-10-CM

## 2018-03-23 PROCEDURE — 77067 SCR MAMMO BI INCL CAD: CPT

## 2018-03-30 ENCOUNTER — HOSPITAL ENCOUNTER (OUTPATIENT)
Dept: ULTRASOUND IMAGING | Facility: CLINIC | Age: 48
Discharge: HOME | End: 2018-03-30
Payer: COMMERCIAL

## 2018-03-30 DIAGNOSIS — R92.8 ABNORMAL SCREENING MAMMOGRAM: ICD-10-CM

## 2018-03-30 PROCEDURE — 76642 ULTRASOUND BREAST LIMITED: CPT

## 2018-04-03 ENCOUNTER — TELEPHONE (OUTPATIENT)
Dept: OBGYN CLINIC | Facility: CLINIC | Age: 48
End: 2018-04-03

## 2018-04-03 NOTE — TELEPHONE ENCOUNTER
----- Message from Liliane Cobian, [address] Parekh sent at 3/30/2018  3:47 PM EDT -----  Additional breast imaging with benign appearing cyst in area of concern  Per radiology she may f/u in one year for routine screening mammo  Continue monthly SBE with low threshold to call with abnormal findings

## 2018-04-23 DIAGNOSIS — D50.9 IRON DEFICIENCY ANEMIA: ICD-10-CM

## 2018-05-14 LAB
BASOPHILS # BLD AUTO: 43 CELLS/UL (ref 0–200)
BASOPHILS NFR BLD AUTO: 0.5 %
EOSINOPHIL # BLD AUTO: 77 CELLS/UL (ref 15–500)
EOSINOPHIL NFR BLD AUTO: 0.9 %
ERYTHROCYTE [DISTWIDTH] IN BLOOD BY AUTOMATED COUNT: 14.1 % (ref 11–15)
FERRITIN SERPL-MCNC: 6 NG/ML (ref 10–232)
HCT VFR BLD AUTO: 37.4 % (ref 35–45)
HGB BLD-MCNC: 12.2 G/DL (ref 11.7–15.5)
IRON SERPL-MCNC: 34 MCG/DL (ref 40–190)
LYMPHOCYTES # BLD AUTO: 2117 CELLS/UL (ref 850–3900)
LYMPHOCYTES NFR BLD AUTO: 24.9 %
MCH RBC QN AUTO: 27.6 PG (ref 27–33)
MCHC RBC AUTO-ENTMCNC: 32.6 G/DL (ref 32–36)
MCV RBC AUTO: 84.6 FL (ref 80–100)
MONOCYTES # BLD AUTO: 978 CELLS/UL (ref 200–950)
MONOCYTES NFR BLD AUTO: 11.5 %
NEUTROPHILS # BLD AUTO: 5287 CELLS/UL (ref 1500–7800)
NEUTROPHILS NFR BLD AUTO: 62.2 %
PLATELET # BLD AUTO: 268 THOUSAND/UL (ref 140–400)
PMV BLD REES-ECKER: 10.8 FL (ref 7.5–12.5)
RBC # BLD AUTO: 4.42 MILLION/UL (ref 3.8–5.1)
WBC # BLD AUTO: 8.5 THOUSAND/UL (ref 3.8–10.8)

## 2018-05-14 NOTE — PROGRESS NOTES
Called patient and she has been taking iron supplements as best as she can when she remembers, but will start to take them daily  And she does get heavy menstrual periods

## 2018-06-14 PROBLEM — M79.10 MYALGIA: Status: ACTIVE | Noted: 2018-01-12

## 2018-06-14 PROBLEM — M25.50 ARTHRALGIA OF MULTIPLE SITES: Status: ACTIVE | Noted: 2018-01-12

## 2018-06-14 PROBLEM — E66.01 SEVERE OBESITY (BMI 35.0-35.9 WITH COMORBIDITY) (HCC): Status: ACTIVE | Noted: 2018-01-12

## 2018-06-15 ENCOUNTER — OFFICE VISIT (OUTPATIENT)
Dept: FAMILY MEDICINE CLINIC | Facility: CLINIC | Age: 48
End: 2018-06-15
Payer: COMMERCIAL

## 2018-06-15 VITALS
DIASTOLIC BLOOD PRESSURE: 68 MMHG | RESPIRATION RATE: 16 BRPM | HEIGHT: 60 IN | TEMPERATURE: 98.5 F | SYSTOLIC BLOOD PRESSURE: 118 MMHG | BODY MASS INDEX: 36.91 KG/M2 | WEIGHT: 188 LBS | HEART RATE: 72 BPM

## 2018-06-15 DIAGNOSIS — G89.29 CHRONIC RIGHT-SIDED THORACIC BACK PAIN: ICD-10-CM

## 2018-06-15 DIAGNOSIS — D50.9 IRON DEFICIENCY ANEMIA, UNSPECIFIED IRON DEFICIENCY ANEMIA TYPE: Primary | ICD-10-CM

## 2018-06-15 DIAGNOSIS — N92.0 MENORRHAGIA WITH REGULAR CYCLE: ICD-10-CM

## 2018-06-15 DIAGNOSIS — E66.01 SEVERE OBESITY (BMI 35.0-35.9 WITH COMORBIDITY) (HCC): ICD-10-CM

## 2018-06-15 DIAGNOSIS — M54.6 CHRONIC RIGHT-SIDED THORACIC BACK PAIN: ICD-10-CM

## 2018-06-15 DIAGNOSIS — E55.9 VITAMIN D DEFICIENCY: ICD-10-CM

## 2018-06-15 DIAGNOSIS — Z13.6 SCREENING FOR CARDIOVASCULAR CONDITION: ICD-10-CM

## 2018-06-15 PROCEDURE — 99214 OFFICE O/P EST MOD 30 MIN: CPT | Performed by: FAMILY MEDICINE

## 2018-06-15 RX ORDER — FERROUS SULFATE 325(65) MG
325 TABLET ORAL
COMMUNITY
End: 2018-07-24 | Stop reason: ALTCHOICE

## 2018-06-15 RX ORDER — TIZANIDINE 2 MG/1
TABLET ORAL
Qty: 30 TABLET | Refills: 1 | Status: SHIPPED | OUTPATIENT
Start: 2018-06-15 | End: 2018-07-24 | Stop reason: ALTCHOICE

## 2018-06-15 NOTE — ASSESSMENT & PLAN NOTE
Patient suffers from chronic right-sided thoracic back pain for the last 2 years  Prescription given for Tizanidine 2 mg to take at bedtime for muscle spasms  Referred to physiatry Dr Rusty Edge  for further evaluation, discuss treatment options

## 2018-06-15 NOTE — ASSESSMENT & PLAN NOTE
Recommended to follow a low carb, low-fat diet  Encouraged regular exercise  Referred to weight management medical program in [facility]

## 2018-06-15 NOTE — ASSESSMENT & PLAN NOTE
Patient has a low Iron, Ferritin level  She continues with heavy menstrual cycles  Recommended to take Ferrous sulfate 325 mg daily  Follow up with gynecology

## 2018-06-15 NOTE — PROGRESS NOTES
Assessment/Plan:    Iron deficiency anemia  Patient has a low Iron, Ferritin level  She continues with heavy menstrual cycles  Recommended to take Ferrous sulfate 325 mg daily  Follow up with gynecology  Menorrhagia  Follow-up with [facility] gynecology group  Severe obesity (BMI 35 0-35 9 with comorbidity) ([facility] [address] )  Recommended to follow a low carb, low-fat diet  Encouraged regular exercise  Referred to weight management medical program in [facility]  Chronic right-sided thoracic back pain  Patient suffers from chronic right-sided thoracic back pain for the last 2 years  Prescription given for Tizanidine 2 mg to take at bedtime for muscle spasms  Referred to physiatry Dr Miles Mays  for further evaluation, discuss treatment options  Vitamin D deficiency  Continue Vit D 1000 IU daily  Schedule follow-up office visit in 6 months  Check labs prior to next visit  Diagnoses and all orders for this visit:    Iron deficiency anemia, unspecified iron deficiency anemia type  -     CBC and differential; Future  -     Iron Panel; Future    Menorrhagia with regular cycle  -     TSH, 3rd generation with T4 reflex; Future    Severe obesity (BMI 35 0-35 9 with comorbidity) ([facility])  -     Comprehensive metabolic panel; Future  -     Ambulatory referral to Weight Management; Future    Screening for cardiovascular condition  -     Lipid panel; Future    Chronic right-sided thoracic back pain  -     Ambulatory referral to Physical Medicine Rehab; Future  -     tiZANidine (ZANAFLEX) 2 mg tablet; Take 1 tablet at bedtime for muscle back spasm    Vitamin D deficiency    Other orders  -     ferrous sulfate 325 (65 Fe) mg tablet; Take 325 mg by mouth daily with breakfast Take 1 tablet daily          Subjective:      Patient ID: Shemar Davenport is a 53 y o  female  HPI     Patient presents for a routine follow-up office visit        She has H/o low iron level, heavy menstrual cycles, obesity, Vit D deficiency  Patient had blood work done on May 14, 2018  Ferritin 6, Hemoglobin 12 2 - stable  Iron 34  Patient states that she started taking OTC  iron supplement daily last month  She was seen by gynecologist in January 2018  Mammogram done in March 2018  Patient c/o chronic right-sided thoracic back pain with radiation to anterior chest area for the last 2 years  She goes to chiropractor, reports minimal improvement with manipulation therapy  Patient denies history of back injury  C/o some back spasms  Non-smoker  The following portions of the patient's history were reviewed and updated as appropriate: allergies, current medications, past family history, past medical history, past social history, past surgical history and problem list     Review of Systems   Constitutional: Positive for fatigue (mild)  Negative for activity change, appetite change, chills and fever  HENT: Negative for congestion, ear pain, nosebleeds, sore throat, tinnitus and trouble swallowing  Eyes: Negative for pain, discharge, redness, itching and visual disturbance  Respiratory: Negative for cough, chest tightness, shortness of breath and wheezing  Cardiovascular: Negative for chest pain, palpitations and leg swelling  Gastrointestinal: Negative for abdominal pain, blood in stool, constipation, diarrhea, nausea and vomiting  Genitourinary: Positive for menstrual problem (heavy menstrual cycles)  Negative for difficulty urinating, dysuria, flank pain, frequency, hematuria and pelvic pain  Musculoskeletal: Positive for back pain (R-sided thoracic back pain )  Negative for joint swelling and neck pain  Back spasms   Neurological: Negative for dizziness, syncope and headaches  Hematological: Negative  Psychiatric/Behavioral: Negative            Objective:      /68   Pulse 72   Temp 98 5 °F (36 9 °C) (Tympanic)   Resp 16   Ht 5' (1 524 m)   Wt 85 3 kg (188 lb)   LMP 06/12/2018   BMI 36 72 kg/m²        Physical Exam   Constitutional: She appears well-developed and well-nourished  Obese   HENT:   Head: Normocephalic and atraumatic  Right Ear: External ear normal    Left Ear: External ear normal    Mouth/Throat: Oropharynx is clear and moist    Eyes: Conjunctivae are normal  Pupils are equal, round, and reactive to light  Neck: Normal range of motion  Neck supple  Cardiovascular: Normal rate, regular rhythm and normal heart sounds  No murmur heard  No carotid bruits BL  No BL LE edema   Pulmonary/Chest: Effort normal and breath sounds normal  She has no wheezes  She has no rales  Abdominal: Soft  Bowel sounds are normal  There is no tenderness  Musculoskeletal:   Back exam: mild tenderness in mid-thoracic area on R side paraspinally  No spinal tenderness  Lymphadenopathy:     She has no cervical adenopathy  Skin: Skin is warm and dry  No rash noted  Psychiatric: She has a normal mood and affect  Nursing note and vitals reviewed

## 2018-07-24 ENCOUNTER — OFFICE VISIT (OUTPATIENT)
Dept: BARIATRICS | Facility: CLINIC | Age: 48
End: 2018-07-24
Payer: COMMERCIAL

## 2018-07-24 ENCOUNTER — TELEPHONE (OUTPATIENT)
Dept: BARIATRICS | Facility: CLINIC | Age: 48
End: 2018-07-24

## 2018-07-24 VITALS
WEIGHT: 187.6 LBS | BODY MASS INDEX: 35.42 KG/M2 | HEART RATE: 80 BPM | DIASTOLIC BLOOD PRESSURE: 78 MMHG | TEMPERATURE: 98.2 F | HEIGHT: 61 IN | RESPIRATION RATE: 16 BRPM | SYSTOLIC BLOOD PRESSURE: 104 MMHG

## 2018-07-24 DIAGNOSIS — R63.5 ABNORMAL WEIGHT GAIN: ICD-10-CM

## 2018-07-24 DIAGNOSIS — E55.9 VITAMIN D DEFICIENCY: ICD-10-CM

## 2018-07-24 DIAGNOSIS — M25.50 ARTHRALGIA OF MULTIPLE SITES: ICD-10-CM

## 2018-07-24 DIAGNOSIS — E66.01 SEVERE OBESITY (BMI 35.0-35.9 WITH COMORBIDITY) (HCC): Primary | ICD-10-CM

## 2018-07-24 DIAGNOSIS — G43.009 MIGRAINE WITHOUT AURA AND WITHOUT STATUS MIGRAINOSUS, NOT INTRACTABLE: Primary | ICD-10-CM

## 2018-07-24 DIAGNOSIS — D50.8 OTHER IRON DEFICIENCY ANEMIA: ICD-10-CM

## 2018-07-24 DIAGNOSIS — E66.01 SEVERE OBESITY (BMI 35.0-35.9 WITH COMORBIDITY) (HCC): ICD-10-CM

## 2018-07-24 PROCEDURE — 99204 OFFICE O/P NEW MOD 45 MIN: CPT | Performed by: PHYSICIAN ASSISTANT

## 2018-07-24 RX ORDER — DIPHENOXYLATE HYDROCHLORIDE AND ATROPINE SULFATE 2.5; .025 MG/1; MG/1
1 TABLET ORAL DAILY
COMMUNITY

## 2018-07-24 NOTE — ASSESSMENT & PLAN NOTE
-Strongly encouraged f/u with hematology  -for now can start Vitron C   One tablet for week one then increase to bid

## 2018-07-24 NOTE — ASSESSMENT & PLAN NOTE
-Discussed options of [facility]-Intensive Lifestyle Intervention Program, Very Low Calorie Diet-VLCD and Conservative Program and the role of weight loss medications   -Initial weight loss goal of 5-10% weight loss for improved health  -Screening labs: CMP and LP as ordered by PCP, however not due at this time so will check BMP  Check vitamin D and fasting insulin  TSH within acceptable limits from 01/2018  -Patient is interested in pursuing VLCD with transition to the Conservative program  Pt will use natural product dye  Reports she avoids red dye to to an allergic reaction to red dye in a tattoo- no anaphylaxis  Natural chocolate beverage provided to the patient  Ingredient list reviewed- no red dye, but if she experiences sx of allergic reaction as reviewed (throat/tongue swelling, SOB) she should call 911

## 2018-07-24 NOTE — PROGRESS NOTES
Assessment/Plan:    Severe obesity (BMI 35 0-35 9 with comorbidity) ([facility] )  -Discussed options of HealthyCORE-Intensive Lifestyle Intervention Program, Very Low Calorie Diet-VLCD and Conservative Program and the role of weight loss medications   -Initial weight loss goal of 5-10% weight loss for improved health  -Screening labs: CMP and LP as ordered by PCP, however not due at this time so will check BMP  Check vitamin D and fasting insulin  TSH within acceptable limits from 01/2018  -Patient is interested in pursuing VLCD with transition to the Conservative program  Pt will use natural product dye  Reports she avoids red dye to to an allergic reaction to red dye in a tattoo- no anaphylaxis  Natural chocolate beverage provided to the patient  Ingredient list reviewed- no red dye, but if she experiences sx of allergic reaction as reviewed (throat/tongue swelling, SOB) she should call 911  Common migraine without aura  -has not experienced migraines for a long time  -triggered by artificial sweeteners, will use natural product line  -can consider topiramate    Iron deficiency anemia  -Strongly encouraged f/u with hematology  -for now can start Vitron C  One tablet for week one then increase to bid    Arthralgia of multiple sites  -sx may improve with weight loss    Follow up for VLCD start and in approximately 2 months with Non-Surgical Physician/Advanced Practitioner  Can not have red dye and will be using artificial sweeteners  Staff message sent regarding lab coverage after visit to call pt -  She should check with PCP regarding CMP coverage since she will be having BMP done now, unless she prefers for CMP to be ordered now instead  Diagnoses and all orders for this visit:    Migraine without aura and without status migrainosus, not intractable  -     Vitamin D 25 hydroxy;  Future    Severe obesity (BMI 35 0-35 9 with comorbidity) ([facility] )  -     Ambulatory referral to Weight Management  -     Insulin, fasting; Future  -     Basic metabolic panel; Future  -     Vitamin D 25 hydroxy; Future    Vitamin D deficiency  -     Vitamin D 25 hydroxy; Future    Abnormal weight gain  -     Insulin, fasting; Future  -     Basic metabolic panel; Future  -     Vitamin D 25 hydroxy; Future    Other iron deficiency anemia    Arthralgia of multiple sites    Other orders  -     multivitamin (THERAGRAN) TABS; Take 1 tablet by mouth daily    Subjective:   Chief Complaint   Patient presents with    Consult     Patient is here for [facility] consult  Patient ID: Lenny Rasheed  is a 53 y o  female with excess weight/obesity here to pursue weight management  Past Medical History:   Diagnosis Date    Allergic rhinitis     Anxiety     Arthralgia of multiple sites     Back pain     RIGHT LOW BACK PAIN   RESOLVED  [id number]  UPPER BACK PAIN RESOLVED [id number]    Breast nodule     Bronchitis     Cellulitis and abscess of leg     RESOLVED 2015    Common migraine without aura     Depression     Eczema     Fatigue     Forceps delivery      4 para 3     H/O molar pregnancy, antepartum     Herpes zoster     [id number] RESOLVED    Iron deficiency anemia     Irregular menses     Myalgia     Obesity     Pain in left shoulder     Prior pregnancy with fetal demise     Right shoulder pain     Sebaceous cyst     Sinusitis     Tinea corporis     Trichomoniasis     Vaginitis     Vitamin D deficiency     Yeast UTI      HPI:  Obesity/Excess Weight:  Severity: Severe  Onset:  Gained 40 lbs in the past 2 years    Modifiers: Commercial Weight Loss Programs-ie  Weight Watchers, [employer], Nutrisystem, etc  and Herbalife, but usually regains  Contributing factors: Poor Food Choices and increased age  Associated symptoms: fatigue, increased joint pain and feels more irritable    Goals: weigh 130 lbs, maintain weight loss  Hydration:3 cups of water per day  4 cups of coffee with cream and sugar     Alcohol: denies    The following portions of the patient's history were reviewed and updated as appropriate: allergies, current medications, past family history, past medical history, past social history, past surgical history and problem list     Review of Systems   Constitutional: Negative for chills and fever  HENT: Negative for sore throat  Respiratory: Negative for cough and shortness of breath  Cardiovascular: Negative for chest pain and palpitations  Gastrointestinal: Negative for abdominal pain, constipation, diarrhea, nausea and vomiting  Denies GERD   Genitourinary: Negative for dysuria  Musculoskeletal: Positive for arthralgias (R shoulder, believes to be worsened by increased weight)  Skin: Negative for rash  Neurological: Positive for headaches (hx of migraines, believed to be triggered by artificial sweeteners)  Psychiatric/Behavioral:        Denies sx of depression     Objective:    /78 (BP Location: Left arm, Patient Position: Sitting, Cuff Size: Large)   Pulse 80   Temp 98 2 °F (36 8 °C) (Tympanic)   Resp 16   Ht 5' 0 5" (1 537 m)   Wt 85 1 kg (187 lb 9 6 oz)   BMI 36 04 kg/m²     Physical Exam   Nursing note and vitals reviewed  Constitutional   General appearance: Abnormal   well developed and obese  Eyes + conjunctival pallor  Ears, Nose, Mouth, and Throat Oral mucosa moist    Pulmonary   Respiratory effort: No increased work of breathing or signs of respiratory distress  Auscultation of lungs: Clear to auscultation, equal breath sounds bilaterally, no wheezes, no rales, no rhonci  Cardiovascular   Auscultation of heart: Normal rate and rhythm, normal S1 and S2, without murmurs  Examination of extremities for edema and/or varicosities: Normal   no edema  Abdomen   Abdomen: Abnormal   The abdomen was obese  Bowel sounds were normal  The abdomen was soft and nontender     Musculoskeletal   Gait and station: Normal     Psychiatric   Orientation to person, place and time: Normal  Affect: appropriate

## 2018-07-24 NOTE — ASSESSMENT & PLAN NOTE
-has not experienced migraines for a long time  -triggered by artificial sweeteners, will use natural product line  -can consider topiramate

## 2018-07-27 ENCOUNTER — TELEPHONE (OUTPATIENT)
Dept: BARIATRICS | Facility: CLINIC | Age: 48
End: 2018-07-27

## 2018-07-27 LAB
BUN SERPL-MCNC: 12 MG/DL (ref 7–25)
BUN/CREAT SERPL: NORMAL (CALC) (ref 6–22)
CALCIUM SERPL-MCNC: 8.6 MG/DL (ref 8.6–10.2)
CHLORIDE SERPL-SCNC: 107 MMOL/L (ref 98–110)
CO2 SERPL-SCNC: 26 MMOL/L (ref 20–31)
CREAT SERPL-MCNC: 0.58 MG/DL (ref 0.5–1.1)
GLUCOSE SERPL-MCNC: 89 MG/DL (ref 65–99)
POTASSIUM SERPL-SCNC: 3.9 MMOL/L (ref 3.5–5.3)
SL AMB EGFR AFRICAN AMERICAN: 127 ML/MIN/1.73M2
SL AMB EGFR NON AFRICAN AMERICAN: 110 ML/MIN/1.73M2
SODIUM SERPL-SCNC: 139 MMOL/L (ref 135–146)

## 2018-07-27 NOTE — TELEPHONE ENCOUNTER
----- Message from Sherron Myers PA-C sent at 7/27/2018 11:11 AM EDT -----  Regarding: Lab  Pt was suppose to have CMP done, not BMP, can you see if the lab can add the additional studies? Thank you!

## 2018-07-29 LAB
25(OH)D3 SERPL-MCNC: 33 NG/ML (ref 30–100)
ALBUMIN SERPL-MCNC: 3.8 G/DL (ref 3.6–5.1)
ALBUMIN/GLOB SERPL: 1.5 (CALC) (ref 1–2.5)
ALP SERPL-CCNC: 83 U/L (ref 33–115)
ALT SERPL-CCNC: 16 U/L (ref 6–29)
AST SERPL-CCNC: 16 U/L (ref 10–35)
BILIRUB SERPL-MCNC: 0.3 MG/DL (ref 0.2–1.2)
BUN SERPL-MCNC: 12 MG/DL (ref 7–25)
BUN/CREAT SERPL: NORMAL (CALC) (ref 6–22)
CALCIUM SERPL-MCNC: 8.6 MG/DL (ref 8.6–10.2)
CHLORIDE SERPL-SCNC: 107 MMOL/L (ref 98–110)
CO2 SERPL-SCNC: 26 MMOL/L (ref 20–31)
CREAT SERPL-MCNC: 0.58 MG/DL (ref 0.5–1.1)
GLOBULIN SER CALC-MCNC: 2.6 G/DL (CALC) (ref 1.9–3.7)
GLUCOSE SERPL-MCNC: 89 MG/DL (ref 65–99)
INSULIN SERPL-ACNC: 17.8 UIU/ML (ref 2–19.6)
POTASSIUM SERPL-SCNC: 3.9 MMOL/L (ref 3.5–5.3)
PROT SERPL-MCNC: 6.4 G/DL (ref 6.1–8.1)
REF LAB TEST NAME: NORMAL
REF LAB TEST: NORMAL
SL AMB CLIENT CONTACT: NORMAL
SL AMB EGFR AFRICAN AMERICAN: 127 ML/MIN/1.73M2
SL AMB EGFR NON AFRICAN AMERICAN: 110 ML/MIN/1.73M2
SODIUM SERPL-SCNC: 139 MMOL/L (ref 135–146)

## 2018-07-30 ENCOUNTER — TELEPHONE (OUTPATIENT)
Dept: BARIATRICS | Facility: CLINIC | Age: 48
End: 2018-07-30

## 2018-07-30 NOTE — TELEPHONE ENCOUNTER
You are seeing the patient tomorrow  Please let her know that her labs were within acceptable limits  Her vitamin D was normal, but she may benefit from taking 1000 IU daily of OTC vitamin D for maintenance  Thank you!

## 2018-07-31 ENCOUNTER — TELEPHONE (OUTPATIENT)
Dept: BARIATRICS | Facility: CLINIC | Age: 48
End: 2018-07-31

## 2018-09-20 ENCOUNTER — TELEPHONE (OUTPATIENT)
Dept: FAMILY MEDICINE CLINIC | Facility: CLINIC | Age: 48
End: 2018-09-20

## 2018-09-20 NOTE — TELEPHONE ENCOUNTER
Patient would like something called in to [facility], [address] for a UTI  Patient can be contacted at [phone] with any questions

## 2018-10-31 ENCOUNTER — HOSPITAL ENCOUNTER (OUTPATIENT)
Dept: RADIOLOGY | Facility: HOSPITAL | Age: 48
Discharge: HOME | End: 2018-10-31
Payer: COMMERCIAL

## 2018-10-31 ENCOUNTER — OFFICE VISIT (OUTPATIENT)
Dept: FAMILY MEDICINE CLINIC | Facility: CLINIC | Age: 48
End: 2018-10-31
Payer: COMMERCIAL

## 2018-10-31 VITALS
OXYGEN SATURATION: 97 % | SYSTOLIC BLOOD PRESSURE: 102 MMHG | HEIGHT: 61 IN | BODY MASS INDEX: 34.93 KG/M2 | RESPIRATION RATE: 16 BRPM | HEART RATE: 70 BPM | DIASTOLIC BLOOD PRESSURE: 70 MMHG | TEMPERATURE: 98.6 F | WEIGHT: 185 LBS

## 2018-10-31 DIAGNOSIS — Z23 NEED FOR INFLUENZA VACCINATION: ICD-10-CM

## 2018-10-31 DIAGNOSIS — G89.29 CHRONIC RIGHT-SIDED THORACIC BACK PAIN: ICD-10-CM

## 2018-10-31 DIAGNOSIS — M54.6 CHRONIC RIGHT-SIDED THORACIC BACK PAIN: Primary | ICD-10-CM

## 2018-10-31 DIAGNOSIS — M54.6 CHRONIC RIGHT-SIDED THORACIC BACK PAIN: ICD-10-CM

## 2018-10-31 DIAGNOSIS — G89.29 CHRONIC RIGHT-SIDED THORACIC BACK PAIN: Primary | ICD-10-CM

## 2018-10-31 DIAGNOSIS — M25.511 ACUTE PAIN OF RIGHT SHOULDER: ICD-10-CM

## 2018-10-31 DIAGNOSIS — M54.12 CERVICAL RADICULOPATHY: ICD-10-CM

## 2018-10-31 PROCEDURE — 1036F TOBACCO NON-USER: CPT | Performed by: FAMILY MEDICINE

## 2018-10-31 PROCEDURE — 72050 X-RAY EXAM NECK SPINE 4/5VWS: CPT

## 2018-10-31 PROCEDURE — 99214 OFFICE O/P EST MOD 30 MIN: CPT | Performed by: FAMILY MEDICINE

## 2018-10-31 PROCEDURE — 72072 X-RAY EXAM THORAC SPINE 3VWS: CPT

## 2018-10-31 PROCEDURE — 90686 IIV4 VACC NO PRSV 0.5 ML IM: CPT

## 2018-10-31 PROCEDURE — 73030 X-RAY EXAM OF SHOULDER: CPT

## 2018-10-31 PROCEDURE — 90471 IMMUNIZATION ADMIN: CPT

## 2018-10-31 RX ORDER — PREDNISONE 10 MG/1
TABLET ORAL
Qty: 21 TABLET | Refills: 0 | Status: SHIPPED | OUTPATIENT
Start: 2018-10-31 | End: 2019-05-08 | Stop reason: ALTCHOICE

## 2018-10-31 NOTE — PROGRESS NOTES
Assessment/Plan:      Diagnoses and all orders for this visit:    Chronic right-sided thoracic back pain  -     Ambulatory referral to Physical Medicine Rehab    Arthralgia of multiple sites    Costochondritis, acute  -     methocarbamol (ROBAXIN) 500 mg tablet; Take 1 tablet (500 mg total) by mouth 3 (three) times a day  -     XR chest 4 + vw; Future    Acute right-sided thoracic back pain      Discussion: 53 y o  Left hand dominant female presents for consultation for acute right sided thoracic pain  I will order chest XR to r/o rib fracture, however patient was advised this is likely not the case as there was no trauma  I will additionally order methocarbamol for muscular spasm as tizanidine caused her fatigue  She was advised to continue prednisone taper prescribed by PCP, as well as OTC topicals, tylenol and aleve for pain relief  She is to complete lab work previously ordered to evaluate for Vit D deficiency  I do not feel further inflammatory lab testing is warranted at this time as previously ordered this year and negative  She is to follow up after completion of these maneuvers  She was advised that is no relief is found through these measures further imaging or possible consultation with pain medicine for costochondral injections  The patient was advised that NSAID-type medications have two very important potential side effects: gastrointestinal irritation including hemorrhage and renal injuries  She was asked to take the medication with food and to stop if she experiences any GI upset  I asked her to call for vomiting, abdominal pain or black/bloody stools  The patient expresses understanding of these issues and questions were answered  The patient was advised that muscle relaxant medications have very important potential side effects including dry mouth, dizziness, and drowsiness/fatigue   She was asked to stop medication if she experiences any adverse side effects, and additionally advised to avoid alcohol, and driving/operating heavy machinery while taking this medication  The patient expresses understanding of these issues and questions were answered  Subjective:     Patient ID: Tatiana Catherine is a 53 y  o left hand dominant female  HPI Referred by PCP for acute right sided thoracic pain as well as cervical and right shoulder pain  Thoracic pain began 1 week ago without trauma  Feels this pain was brought on by increased driving over the last month  She describes as a burning pain that radiates across right rib cage into sternum  PCP ordered XR of thoracic spine, showing diffuse degenerative changes  Neck and right shoulder pain chronic x 2 years without specific inciting event, but notes history of fall onto right shoulder many years ago  XR of C/S and shoulder also completed yesterday showing no fractures or osseous abnormalities  Describes neck pain as stiffness, without decreased ROM  She describes shoulder pain as a tightness below her shoulder blade  She has a history of diffuse myalgia and fatigue in which lab work for inflammatory disorders and vitamin deficiencies was completed  History of iron deficiency anemia, as well as vit D insufficiency  Currently followed by weight management who reordered lab work to check for Vit  D deficiency, thyroid function, and anemia  She completed physical therapy for neck and shoulder this year with minimal improvement  She has never completed physical therapy for her thoracic pain  PCP was previously prescribing tizanidine 2 mg at bedtime for spasms but caused fatigue  Currently taking prednisone taper (1st dose today), Bengay and tylenol and aleve with relief  One incident of tingling in right fingers but resolved  Denies radiating pain into arms, weakness  Denies recent illness, cough, chest pain, SOB  Does feel increased pain in right thoracic with deep inspiration  Currently working as university    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  ADLs not limited          PAST MEDICAL HISTORY  Past Medical History:   Diagnosis Date    Allergic rhinitis     Anxiety     Arthralgia of multiple sites     Back pain     RIGHT LOW BACK PAIN   RESOLVED  [id number]  UPPER BACK PAIN RESOLVED [id number]    Breast nodule     Bronchitis     Cellulitis and abscess of leg     RESOLVED 2015    Common migraine without aura     Depression     Eczema     Fatigue     Forceps delivery      4 para 4     H/O molar pregnancy, antepartum     Herpes zoster     [id number] RESOLVED    Iron deficiency anemia     Irregular menses     Myalgia     Obesity     Pain in left shoulder     Prior pregnancy with fetal demise     Right shoulder pain     Sebaceous cyst     Sinusitis     Tinea corporis     Trichomoniasis     Vaginitis     Vitamin D deficiency     Yeast UTI      PAST SURGICAL HISTORY  Past Surgical History:   Procedure Laterality Date    ADENOIDECTOMY      APPENDECTOMY      DILATION AND CURETTAGE OF UTERUS      LAPAROSCOPY      OVARY SURGERY      TONSILLECTOMY      OVER AGE 12    TUBAL LIGATION      VENTRAL HERNIA REPAIR         FAMILY HISTORY  Family History   Problem Relation Age of Onset    COPD Father     Lung cancer Father     Diabetes Maternal Grandmother     Diabetes Maternal Aunt     No Known Problems Mother     No Known Problems Maternal Grandfather     Hypertension Neg Hx     Heart disease Neg Hx     Stroke Neg Hx     Thyroid disease Neg Hx      HOME MEDICATIONS  Current Outpatient Prescriptions   Medication Sig Dispense Refill    cetirizine (ZYRTEC ALLERGY) 10 mg tablet Take 10 mg by mouth as needed        multivitamin (THERAGRAN) TABS Take 1 tablet by mouth daily      predniSONE 10 mg tablet Take 4 tab  for 3 days, then 2 tab  for 3 days, then 1 tab  for 3 days, then stop 21 tablet 0    methocarbamol (ROBAXIN) 500 mg tablet Take 1 tablet (500 mg total) by mouth 3 (three) times a day 60 tablet 0     No current facility-administered medications for this visit  ALLERGIES  Red dye; Latex; Mold extract [trichophyton]; and Penicillins      SOCIAL HISTORY  History   Alcohol Use No     History   Drug Use No     History   Smoking Status    Never Smoker   Smokeless Tobacco    Never Used     Review of Systems   Constitutional: Negative for activity change, chills, diaphoresis, fatigue, fever and unexpected weight change  Respiratory: Negative  Negative for cough, chest tightness, shortness of breath and wheezing  Cardiovascular: Negative  Negative for chest pain and palpitations  Gastrointestinal: Negative  Genitourinary: Negative  Musculoskeletal: Positive for arthralgias, back pain, myalgias and neck stiffness  Negative for neck pain  Skin: Negative for rash  Allergic/Immunologic: Positive for environmental allergies  Neurological: Positive for weakness (one occasion right hand, since resolved)  Negative for light-headedness and numbness  Psychiatric/Behavioral: Positive for sleep disturbance  Objective:  Vitals:    11/01/18 1423   BP: 100/72   Pulse: 76       Imaging:  THORACIC SPINE 10/31/18  INDICATION:   M54 6: Pain in thoracic spine  G89 29: Other chronic pain  COMPARISON:  None  VIEWS:  XR SPINE THORACIC 3 VW  FINDINGS:  There is no fracture or pathologic bone lesion  Thoracic vertebral alignment is within normal limits  There is mild overall disc space narrowing in the thoracic spine with scattered mild/moderate marginal osteophyte formation mainly in the mid to lower thoracic spine  There is no displacement of the paraspinal line  The pedicles appear intact  IMPRESSION:  Mild/moderate degenerative changes in the thoracic spine  CERVICAL SPINE 10/31/18  INDICATION:   M54 12: Radiculopathy, cervical region  COMPARISON:  None  VIEWS:  XR SPINE CERVICAL COMPLETE 4 OR 5 VW NON INJURY   FINDINGS:  No evidence of fracture  Normal alignment without subluxation  Mild disc space narrowing noted at C5-C6 with moderate marginal osteophyte formation  Otherwise relative preservation of the disc spaces  The neural foramina are patent  The prevertebral soft tissues are within normal limits  The lung apices are clear  IMPRESSION:  1  Degenerative changes at C5-C6  VIEWS:  XR SHOULDER 2+ VW RIGHT 10/31/18  FINDINGS:  There is no acute fracture or dislocation  No significant degenerative changes  No lytic or blastic lesions are seen  Soft tissues are unremarkable  IMPRESSION:  No acute osseous abnormality  Labs:  Ref Range & Units 7/26/18  7:38 AM    Vitamin D, 25-Hydroxy, Serum 30 - 100 ng/mL 33       Ref Range & Units 1/19/18  9:15 AM Flag   Vit D, 25-Hydroxy 30 - 100 ng/mL 24         Ref Range & Units 1/19/18  9:15 AM   Free T4 0 8 - 1 8 ng/dL 1 1       Ref Range & Units 1/19/18  9:15 AM Flag   Ferritin 10 - 232 ng/mL 7   L       Ref Range & Units 1/19/18  9:15 AM   ERYTHROCYTE SEDIMENTATION RATE < OR = 20 mm/h 11       Ref Range & Units 1/19/18  9:15 AM   Lyme IgG/IgM Ab index <0 90       Ref Range & Units 1/19/18  9:15 AM   ANTI-NUCLEAR ANTIBODY (KEM) NEGATIVE NEGATIVE       Ref Range & Units 1/19/18  9:15 AM   TSH 3RD GENERATON mIU/L 1 87       Ref Range & Units 1/19/18  9:15 AM   C-Reactive Protein, Quant <8 0 mg/L 7 0       Ref Range & Units 1/19/18  9:15 AM   SL KEM SCREEN, IFA NEGATIVE NEGATIVE             Physical Exam   Constitutional: She is oriented to person, place, and time  She appears well-developed and well-nourished  No distress  HENT:   Head: Normocephalic and atraumatic  Pulmonary/Chest: No respiratory distress  She has no wheezes  She has no rales  Musculoskeletal:        Right shoulder: She exhibits normal range of motion, no tenderness, no bony tenderness and no swelling  Cervical back: She exhibits normal range of motion, no tenderness, no bony tenderness, no pain and no spasm  Thoracic back: She exhibits tenderness and pain  She exhibits normal range of motion, no bony tenderness, no swelling, no edema, no deformity and no spasm  Back:    TTP right costochondrals T8-10  Chest wall pain and radiation at these levels  Mild right subscapular TTP, but no tenderness, crepitus or pain of shoulder proper  Negative right Mims, Neer's, Speeds, and empty can tests  Negative cervical tenderness  Normal ROM neck and right shoulder  Thoracic ROM intact, with pain on alteral rotation  Neurological: She is alert and oriented to person, place, and time  She has normal strength  She displays no atrophy and no tremor  No sensory deficit  She exhibits normal muscle tone  Coordination and gait normal    Reflex Scores:       Tricep reflexes are 2+ on the right side and 2+ on the left side  Bicep reflexes are 2+ on the right side and 2+ on the left side  Brachioradialis reflexes are 2+ on the right side and 2+ on the left side  Patellar reflexes are 2+ on the right side and 2+ on the left side  Achilles reflexes are 2+ on the right side and 2+ on the left side  Strength 5/5 bilaterally       Skin: Skin is warm and dry  No bruising and no rash noted  She is not diaphoretic  No dermatomal rashes    Psychiatric: She has a normal mood and affect   Her behavior is normal  Judgment and thought content normal

## 2018-10-31 NOTE — PROGRESS NOTES
Chief Complaint   Patient presents with    Shoulder Pain     Right side, traveling to back     Health Maintenance   Topic Date Due    DTaP,Tdap,and Td Vaccines (1 - Tdap) 03/23/1992    INFLUENZA VACCINE  07/01/2018    Depression Screening PHQ  06/15/2019     Assessment/Plan:    Chronic right-sided thoracic back pain  Check X-ray thoracic spine  Recommended to schedule appointment with physiatrist Dr Anaya Smith  Acute pain of right shoulder  Will check X-ray R shoulder  Recommended icing, avoid heavy lifting, strenuous activities  Consider orthopedic surgeon evaluation is continues with pain, decreased ROM  Cervical radiculopathy  Check x-ray of the cervical spine to rule out degenerative disc disease  Will start on oral Prednisone course  Recommended to call office if symptoms persist or worsen  HM: Fluzone administered today  Diagnoses and all orders for this visit:    Chronic right-sided thoracic back pain  -     XR spine thoracic 3 vw; Future  -     predniSONE 10 mg tablet; Take 4 tab  for 3 days, then 2 tab  for 3 days, then 1 tab  for 3 days, then stop  -     Ambulatory referral to Physical Medicine Rehab; Future    Acute pain of right shoulder  -     XR shoulder 2+ vw right; Future    Cervical radiculopathy  -     XR spine cervical complete 4 or 5 vw non injury; Future  -     predniSONE 10 mg tablet; Take 4 tab  for 3 days, then 2 tab  for 3 days, then 1 tab  for 3 days, then stop  -     Ambulatory referral to Physical Medicine Rehab; Future    Need for influenza vaccination  -     SYRINGE/SINGLE-DOSE VIAL: influenza vaccine, [id number], quadrivalent, 0 5 mL, preservative-free, for patients 3+ yr (FLUZONE)          Subjective:      Patient ID: Toshia Jasso is a 53 y o  female  HPI     Patient presents to the office c/o flare up of the chronic right-sided thoracic back pain for the past week, some numbness and tingling sensation in the right upper back, right upper arm    Pain radiates to R shoulder and R anterior chest area  Patient states that pain worsens when sleeping on the right side, driving a car  C/o tingling sensation in right hand fingers  Also c/o pain in right shoulder increasing with lifting   R arm  Patient was seen in 6/18 for R -sided thoracic back pain which she has for the past 2 years  She was referred to PT and was recommended to schedule appointment with physiatrist Dr Dallas Franz  She tried physical therapy with minimal improvement in symptoms  Due to busy work schedule she did not make appointment with Dr Dallas Franz  Patient has been taking Tylenol, Ibuprofen with minimal relief in pain  Denies headache, dizziness  No prior history of herniated disc or back injury  The following portions of the patient's history were reviewed and updated as appropriate: allergies, past family history, past medical history, past social history, past surgical history and problem list     Review of Systems   Constitutional: Positive for fatigue  Negative for activity change, appetite change, chills and fever  HENT: Negative for congestion, nosebleeds, sore throat, tinnitus and trouble swallowing  Eyes: Negative for pain, discharge, redness, itching and visual disturbance  Respiratory: Negative for cough, chest tightness, shortness of breath and wheezing  Cardiovascular: Negative for chest pain, palpitations and leg swelling  Gastrointestinal: Negative  Genitourinary: Negative  Musculoskeletal:        See HPI   Skin: Negative for rash and wound  Neurological: Negative for dizziness and headaches  Numbness, tingling in R upper thoracic area, R upper arm  Tingling in R hand fingers  Hematological: Negative  Psychiatric/Behavioral: Negative            Objective:      /70 (BP Location: Left arm, Patient Position: Sitting, Cuff Size: Adult)   Pulse 70   Temp 98 6 °F (37 °C) (Oral)   Resp 16   Ht 5' 0 5" (1 537 m)   Wt 83 9 kg (185 lb)   LMP 10/22/2018 (Exact Date)   SpO2 97%   BMI 35 54 kg/m²        Physical Exam   Constitutional: She appears well-developed  Obese   HENT:   Head: Normocephalic and atraumatic  Right Ear: External ear normal    Left Ear: External ear normal    Mouth/Throat: Oropharynx is clear and moist    Eyes: Pupils are equal, round, and reactive to light  Conjunctivae are normal    Neck: Normal range of motion  Neck supple  Cardiovascular: Normal rate, regular rhythm and normal heart sounds  No murmur heard  No BL LE edema   Pulmonary/Chest: Effort normal and breath sounds normal  She has no wheezes  She has no rales  Abdominal: Soft  Bowel sounds are normal  There is no tenderness  Musculoskeletal:   Neck: no spinal tenderness  Mild pain with lateral rotation to L side  Back exam: no spinal tenderness  Paraspinal tenderness in R upper thoracic area  R shoulder: decreased ROM with abduction, internal rotation, lifting R arm  Mild tenderness over anterior aspect R shoulder  Lymphadenopathy:     She has no cervical adenopathy  Skin: Skin is warm and dry  No rash noted  Psychiatric: She has a normal mood and affect  Nursing note and vitals reviewed

## 2018-10-31 NOTE — ASSESSMENT & PLAN NOTE
Check x-ray of the cervical spine to rule out degenerative disc disease  Will start on oral Prednisone course  Recommended to call office if symptoms persist or worsen

## 2018-11-01 ENCOUNTER — HOSPITAL ENCOUNTER (OUTPATIENT)
Dept: RADIOLOGY | Facility: HOSPITAL | Age: 48
Discharge: HOME | End: 2018-11-01
Payer: COMMERCIAL

## 2018-11-01 ENCOUNTER — OFFICE VISIT (OUTPATIENT)
Dept: PAIN MEDICINE | Facility: CLINIC | Age: 48
End: 2018-11-01
Payer: COMMERCIAL

## 2018-11-01 VITALS
BODY MASS INDEX: 36.91 KG/M2 | SYSTOLIC BLOOD PRESSURE: 100 MMHG | WEIGHT: 188 LBS | HEART RATE: 76 BPM | DIASTOLIC BLOOD PRESSURE: 72 MMHG | HEIGHT: 60 IN

## 2018-11-01 DIAGNOSIS — M94.0 COSTOCHONDRITIS, ACUTE: ICD-10-CM

## 2018-11-01 DIAGNOSIS — M54.6 CHRONIC RIGHT-SIDED THORACIC BACK PAIN: Primary | ICD-10-CM

## 2018-11-01 DIAGNOSIS — G89.29 CHRONIC RIGHT-SIDED THORACIC BACK PAIN: Primary | ICD-10-CM

## 2018-11-01 DIAGNOSIS — M25.50 ARTHRALGIA OF MULTIPLE SITES: ICD-10-CM

## 2018-11-01 DIAGNOSIS — M54.6 ACUTE RIGHT-SIDED THORACIC BACK PAIN: ICD-10-CM

## 2018-11-01 PROCEDURE — 99204 OFFICE O/P NEW MOD 45 MIN: CPT | Performed by: PHYSICIAN ASSISTANT

## 2018-11-01 PROCEDURE — 71046 X-RAY EXAM CHEST 2 VIEWS: CPT

## 2018-11-01 RX ORDER — METHOCARBAMOL 500 MG/1
500 TABLET, FILM COATED ORAL 3 TIMES DAILY
Qty: 60 TABLET | Refills: 0 | Status: SHIPPED | OUTPATIENT
Start: 2018-11-01 | End: 2019-05-08 | Stop reason: ALTCHOICE

## 2018-11-02 NOTE — PROGRESS NOTES
Left detailed message with results and instructions for patient  meds refilled and switched to [place]  Hypertensive Medications  Protocol Criteria:  · Appointment scheduled in the past 6 months or in the next 3 months  · BMP or CMP in the past 12 months  · Creatinine result < 2  Recent Visits       Provider Department [facility]

## 2018-12-13 LAB
ALBUMIN SERPL-MCNC: 3.8 G/DL (ref 3.6–5.1)
ALBUMIN/GLOB SERPL: 1.7 (CALC) (ref 1–2.5)
ALP SERPL-CCNC: 77 U/L (ref 33–115)
ALT SERPL-CCNC: 14 U/L (ref 6–29)
AST SERPL-CCNC: 12 U/L (ref 10–35)
BASOPHILS # BLD AUTO: 41 CELLS/UL (ref 0–200)
BASOPHILS NFR BLD AUTO: 0.6 %
BILIRUB SERPL-MCNC: 0.4 MG/DL (ref 0.2–1.2)
BUN SERPL-MCNC: 12 MG/DL (ref 7–25)
BUN/CREAT SERPL: NORMAL (CALC) (ref 6–22)
CALCIUM SERPL-MCNC: 8.7 MG/DL (ref 8.6–10.2)
CHLORIDE SERPL-SCNC: 107 MMOL/L (ref 98–110)
CHOLEST SERPL-MCNC: 153 MG/DL
CHOLEST/HDLC SERPL: 3.4 (CALC)
CO2 SERPL-SCNC: 27 MMOL/L (ref 20–32)
CREAT SERPL-MCNC: 0.67 MG/DL (ref 0.5–1.1)
EOSINOPHIL # BLD AUTO: 48 CELLS/UL (ref 15–500)
EOSINOPHIL NFR BLD AUTO: 0.7 %
ERYTHROCYTE [DISTWIDTH] IN BLOOD BY AUTOMATED COUNT: 13.7 % (ref 11–15)
GLOBULIN SER CALC-MCNC: 2.3 G/DL (CALC) (ref 1.9–3.7)
GLUCOSE SERPL-MCNC: 88 MG/DL (ref 65–99)
HCT VFR BLD AUTO: 37.6 % (ref 35–45)
HDLC SERPL-MCNC: 45 MG/DL
HGB BLD-MCNC: 12.5 G/DL (ref 11.7–15.5)
IRON SATN MFR SERPL: 16 % (CALC) (ref 11–50)
IRON SERPL-MCNC: 46 MCG/DL (ref 40–190)
LDLC SERPL CALC-MCNC: 93 MG/DL (CALC)
LYMPHOCYTES # BLD AUTO: 1973 CELLS/UL (ref 850–3900)
LYMPHOCYTES NFR BLD AUTO: 28.6 %
MCH RBC QN AUTO: 28.7 PG (ref 27–33)
MCHC RBC AUTO-ENTMCNC: 33.2 G/DL (ref 32–36)
MCV RBC AUTO: 86.4 FL (ref 80–100)
MONOCYTES # BLD AUTO: 614 CELLS/UL (ref 200–950)
MONOCYTES NFR BLD AUTO: 8.9 %
NEUTROPHILS # BLD AUTO: 4223 CELLS/UL (ref 1500–7800)
NEUTROPHILS NFR BLD AUTO: 61.2 %
NONHDLC SERPL-MCNC: 108 MG/DL (CALC)
PLATELET # BLD AUTO: 251 THOUSAND/UL (ref 140–400)
PMV BLD REES-ECKER: 10.5 FL (ref 7.5–12.5)
POTASSIUM SERPL-SCNC: 4.3 MMOL/L (ref 3.5–5.3)
PROT SERPL-MCNC: 6.1 G/DL (ref 6.1–8.1)
RBC # BLD AUTO: 4.35 MILLION/UL (ref 3.8–5.1)
SL AMB EGFR AFRICAN AMERICAN: 121 ML/MIN/1.73M2
SL AMB EGFR NON AFRICAN AMERICAN: 105 ML/MIN/1.73M2
SODIUM SERPL-SCNC: 139 MMOL/L (ref 135–146)
TIBC SERPL-MCNC: 287 MCG/DL (CALC) (ref 250–450)
TRIGL SERPL-MCNC: 65 MG/DL
TSH SERPL-ACNC: 2.16 MIU/L
WBC # BLD AUTO: 6.9 THOUSAND/UL (ref 3.8–10.8)

## 2019-01-03 ENCOUNTER — OFFICE VISIT (OUTPATIENT)
Dept: URGENT CARE | Facility: CLINIC | Age: 49
End: 2019-01-03
Payer: COMMERCIAL

## 2019-01-03 VITALS
HEIGHT: 60 IN | SYSTOLIC BLOOD PRESSURE: 120 MMHG | HEART RATE: 91 BPM | OXYGEN SATURATION: 98 % | WEIGHT: 193.6 LBS | DIASTOLIC BLOOD PRESSURE: 82 MMHG | TEMPERATURE: 99.5 F | RESPIRATION RATE: 16 BRPM | BODY MASS INDEX: 38.01 KG/M2

## 2019-01-03 DIAGNOSIS — L03.116 CELLULITIS OF LEFT ANKLE: Primary | ICD-10-CM

## 2019-01-03 PROCEDURE — G0382 LEV 3 HOSP TYPE B ED VISIT: HCPCS | Performed by: FAMILY MEDICINE

## 2019-01-03 RX ORDER — DOXYCYCLINE HYCLATE 100 MG/1
100 CAPSULE ORAL EVERY 12 HOURS SCHEDULED
Qty: 20 CAPSULE | Refills: 0 | Status: SHIPPED | OUTPATIENT
Start: 2019-01-03 | End: 2019-01-13

## 2019-01-03 NOTE — PROGRESS NOTES
[facility]        NAME: Huong Downey is a 53 y o  female  : 1970    MRN: [id number]  DATE: January 3, 2019  TIME: 7:05 PM    Assessment and Plan   Cellulitis of left ankle [L03 116]  1  Cellulitis of left ankle  doxycycline hyclate (VIBRAMYCIN) 100 mg capsule         Patient Instructions   Patient Instructions   Doxycycline as directed  Alternate ice with Warm compresses 20 min 3-4 times daily  Ibuprofen as needed        Proceed to  ER if symptoms worsen  Chief Complaint     Chief Complaint   Patient presents with   [address]     yesterday while in [facility]; pt reports bite on L ankle is itchy, painful, and hot; pain 8/10; pt took tylenol this am and bendryl @ 1200         History of Present Illness       Patient presents left lateral ankle pain and swelling that began yesterday, she was bit by something in [place], the discomfort and swelling has increased and the pain is described as a burning and throbbing sensation  Patient denies fevers or chills, no difficulty ambulating on the foot ankle        Review of Systems   Review of Systems   Constitutional: Negative  Musculoskeletal: Positive for arthralgias  Skin: Positive for wound           Current Medications       Current Outpatient Prescriptions:     cetirizine (ZYRTEC ALLERGY) 10 mg tablet, Take 10 mg by mouth as needed  , Disp: , Rfl:     multivitamin (THERAGRAN) TABS, Take 1 tablet by mouth daily, Disp: , Rfl:     doxycycline hyclate (VIBRAMYCIN) 100 mg capsule, Take 1 capsule (100 mg total) by mouth every 12 (twelve) hours for 10 days, Disp: 20 capsule, Rfl: 0    methocarbamol (ROBAXIN) 500 mg tablet, Take 1 tablet (500 mg total) by mouth 3 (three) times a day (Patient not taking: Reported on 1/3/2019 ), Disp: 60 tablet, Rfl: 0    predniSONE 10 mg tablet, Take 4 tab  for 3 days, then 2 tab  for 3 days, then 1 tab  for 3 days, then stop (Patient not taking: Reported on 1/3/2019 ), Disp: 21 tablet, Rfl: 0    Current Allergies     Allergies as of 2019 - Reviewed 2019   Allergen Reaction Noted    Red dye Anaphylaxis and Edema 2013    Latex Rash 2017    Mold extract [trichophyton] Rash 2017    Penicillins Rash 2013            The following portions of the patient's history were reviewed and updated as appropriate: allergies, current medications, past family history, past medical history, past social history, past surgical history and problem list      Past Medical History:   Diagnosis Date    Allergic rhinitis     Anxiety     Arthralgia of multiple sites     Back pain     RIGHT LOW BACK PAIN   RESOLVED  [id number]  UPPER BACK PAIN RESOLVED [id number]    Breast nodule     Bronchitis     Cellulitis and abscess of leg     RESOLVED 2015    Common migraine without aura     Depression     Eczema     Fatigue     Forceps delivery      4 para 3     H/O molar pregnancy, antepartum     Herpes zoster     [id number] RESOLVED    Iron deficiency anemia     Irregular menses     Myalgia     Obesity     Pain in left shoulder     Prior pregnancy with fetal demise     Right shoulder pain     Sebaceous cyst     Sinusitis     Tinea corporis     Trichomoniasis     Vaginitis     Vitamin D deficiency     Yeast UTI        Past Surgical History:   Procedure Laterality Date    ADENOIDECTOMY      APPENDECTOMY      DILATION AND CURETTAGE OF UTERUS      LAPAROSCOPY      OVARY SURGERY      TONSILLECTOMY      OVER AGE 15    TUBAL LIGATION      VENTRAL HERNIA REPAIR         Family History   Problem Relation Age of Onset    COPD Father     Lung cancer Father     Diabetes Maternal Grandmother     Diabetes Maternal Aunt     No Known Problems Mother     No Known Problems Maternal Grandfather     Hypertension Neg Hx     Heart disease Neg Hx     Stroke Neg Hx     Thyroid disease Neg Hx          Medications have been verified          Objective   /82   Pulse 91   Temp 99 5 °F (37 5 °C) (Tympanic)   Resp 16   Ht 5' (1 524 m)   Wt 87 8 kg (193 lb 9 6 oz)   SpO2 98%   BMI 37 81 kg/m²        Physical Exam     Physical Exam   Constitutional: She appears well-developed and well-nourished  No distress  Musculoskeletal: She exhibits edema and tenderness  Full active range of motion of the ankle without pain or restriction   Skin: Skin is warm  Rash noted     Two bite sites lateral aspect left ankle with soft tissue swelling warmth and erythema

## 2019-01-04 NOTE — PATIENT INSTRUCTIONS
Doxycycline as directed  Alternate ice with Warm compresses 20 min 3-4 times daily  Ibuprofen as needed

## 2019-02-22 DIAGNOSIS — Z12.31 ENCOUNTER FOR SCREENING MAMMOGRAM FOR MALIGNANT NEOPLASM OF BREAST: Primary | ICD-10-CM

## 2019-04-11 ENCOUNTER — ANNUAL EXAM (OUTPATIENT)
Dept: OBGYN CLINIC | Facility: MEDICAL CENTER | Age: 49
End: 2019-04-11
Payer: COMMERCIAL

## 2019-04-11 VITALS — BODY MASS INDEX: 35.15 KG/M2 | DIASTOLIC BLOOD PRESSURE: 72 MMHG | WEIGHT: 180 LBS | SYSTOLIC BLOOD PRESSURE: 120 MMHG

## 2019-04-11 DIAGNOSIS — Z01.419 ENCOUNTER FOR GYNECOLOGICAL EXAMINATION (GENERAL) (ROUTINE) WITHOUT ABNORMAL FINDINGS: Primary | ICD-10-CM

## 2019-04-11 DIAGNOSIS — Z12.39 SCREENING FOR MALIGNANT NEOPLASM OF BREAST: ICD-10-CM

## 2019-04-11 DIAGNOSIS — M25.511 ACUTE PAIN OF RIGHT SHOULDER: ICD-10-CM

## 2019-04-11 DIAGNOSIS — N90.7 SEBACEOUS CYST OF LABIA: ICD-10-CM

## 2019-04-11 PROBLEM — Z12.31 ENCOUNTER FOR SCREENING MAMMOGRAM FOR MALIGNANT NEOPLASM OF BREAST: Status: RESOLVED | Noted: 2018-01-30 | Resolved: 2019-04-11

## 2019-04-11 PROBLEM — Z11.51 ENCOUNTER FOR SCREENING FOR HUMAN PAPILLOMAVIRUS (HPV): Status: RESOLVED | Noted: 2018-01-30 | Resolved: 2019-04-11

## 2019-04-11 PROCEDURE — 99396 PREV VISIT EST AGE 40-64: CPT | Performed by: NURSE PRACTITIONER

## 2019-05-07 RX ORDER — BETAMETHASONE DIPROPIONATE 0.5 MG/G
CREAM TOPICAL
COMMUNITY
Start: 2019-04-02 | End: 2021-08-16 | Stop reason: ALTCHOICE

## 2019-05-08 ENCOUNTER — OFFICE VISIT (OUTPATIENT)
Dept: FAMILY MEDICINE CLINIC | Facility: CLINIC | Age: 49
End: 2019-05-08
Payer: COMMERCIAL

## 2019-05-08 ENCOUNTER — HOSPITAL ENCOUNTER (OUTPATIENT)
Dept: RADIOLOGY | Facility: HOSPITAL | Age: 49
Discharge: HOME | End: 2019-05-08
Payer: COMMERCIAL

## 2019-05-08 VITALS
BODY MASS INDEX: 35.93 KG/M2 | SYSTOLIC BLOOD PRESSURE: 112 MMHG | WEIGHT: 183 LBS | RESPIRATION RATE: 16 BRPM | HEART RATE: 74 BPM | OXYGEN SATURATION: 98 % | HEIGHT: 60 IN | DIASTOLIC BLOOD PRESSURE: 66 MMHG | TEMPERATURE: 98.2 F

## 2019-05-08 DIAGNOSIS — G89.29 CHRONIC RIGHT-SIDED THORACIC BACK PAIN: ICD-10-CM

## 2019-05-08 DIAGNOSIS — M89.8X8 MASS OF STERNUM: Primary | ICD-10-CM

## 2019-05-08 DIAGNOSIS — M89.8X8 MASS OF STERNUM: ICD-10-CM

## 2019-05-08 DIAGNOSIS — M54.6 CHRONIC RIGHT-SIDED THORACIC BACK PAIN: ICD-10-CM

## 2019-05-08 PROBLEM — M25.511 ACUTE PAIN OF RIGHT SHOULDER: Status: RESOLVED | Noted: 2018-10-31 | Resolved: 2019-05-08

## 2019-05-08 PROCEDURE — 71120 X-RAY EXAM BREASTBONE 2/>VWS: CPT

## 2019-05-08 PROCEDURE — 76705 ECHO EXAM OF ABDOMEN: CPT

## 2019-05-08 PROCEDURE — 99214 OFFICE O/P EST MOD 30 MIN: CPT | Performed by: FAMILY MEDICINE

## 2019-05-08 RX ORDER — COVID-19 ANTIGEN TEST
KIT MISCELLANEOUS
COMMUNITY
End: 2020-07-02 | Stop reason: ALTCHOICE

## 2019-05-13 DIAGNOSIS — M89.8X8 MASS OF STERNUM: Primary | ICD-10-CM

## 2019-05-16 ENCOUNTER — OFFICE VISIT (OUTPATIENT)
Dept: PAIN MEDICINE | Facility: MEDICAL CENTER | Age: 49
End: 2019-05-16
Payer: COMMERCIAL

## 2019-05-16 VITALS
RESPIRATION RATE: 14 BRPM | DIASTOLIC BLOOD PRESSURE: 74 MMHG | WEIGHT: 181.6 LBS | HEIGHT: 60 IN | SYSTOLIC BLOOD PRESSURE: 110 MMHG | BODY MASS INDEX: 35.65 KG/M2 | HEART RATE: 83 BPM

## 2019-05-16 DIAGNOSIS — G89.29 CHRONIC RIGHT-SIDED THORACIC BACK PAIN: ICD-10-CM

## 2019-05-16 DIAGNOSIS — M79.18 MYOFASCIAL PAIN SYNDROME: Primary | ICD-10-CM

## 2019-05-16 DIAGNOSIS — M54.6 CHRONIC RIGHT-SIDED THORACIC BACK PAIN: ICD-10-CM

## 2019-05-16 PROCEDURE — 99244 OFF/OP CNSLTJ NEW/EST MOD 40: CPT | Performed by: PHYSICAL MEDICINE & REHABILITATION

## 2019-05-28 ENCOUNTER — HOSPITAL ENCOUNTER (OUTPATIENT)
Dept: MAMMOGRAPHY | Facility: HOSPITAL | Age: 49
Discharge: HOME | End: 2019-05-28
Payer: COMMERCIAL

## 2019-05-28 VITALS — BODY MASS INDEX: 35.34 KG/M2 | WEIGHT: 180 LBS | HEIGHT: 60 IN

## 2019-05-28 DIAGNOSIS — Z12.39 SCREENING FOR MALIGNANT NEOPLASM OF BREAST: ICD-10-CM

## 2019-05-28 PROCEDURE — 77067 SCR MAMMO BI INCL CAD: CPT

## 2019-05-29 ENCOUNTER — CONSULT (OUTPATIENT)
Dept: SURGERY | Facility: CLINIC | Age: 49
End: 2019-05-29
Payer: COMMERCIAL

## 2019-05-29 VITALS
DIASTOLIC BLOOD PRESSURE: 96 MMHG | TEMPERATURE: 98.3 F | WEIGHT: 182 LBS | BODY MASS INDEX: 35.73 KG/M2 | SYSTOLIC BLOOD PRESSURE: 146 MMHG | HEART RATE: 75 BPM | HEIGHT: 60 IN

## 2019-05-29 DIAGNOSIS — M89.8X8 MASS OF STERNUM: ICD-10-CM

## 2019-05-29 PROCEDURE — 99242 OFF/OP CONSLTJ NEW/EST SF 20: CPT | Performed by: SURGERY

## 2019-06-13 ENCOUNTER — PROCEDURE VISIT (OUTPATIENT)
Dept: PAIN MEDICINE | Facility: MEDICAL CENTER | Age: 49
End: 2019-06-13
Payer: COMMERCIAL

## 2019-06-13 DIAGNOSIS — M54.9 MID BACK PAIN: Primary | ICD-10-CM

## 2019-06-13 DIAGNOSIS — M79.18 MYOFASCIAL PAIN SYNDROME: ICD-10-CM

## 2019-06-13 PROCEDURE — 76942 ECHO GUIDE FOR BIOPSY: CPT | Performed by: PHYSICAL MEDICINE & REHABILITATION

## 2019-06-13 PROCEDURE — 20550 NJX 1 TENDON SHEATH/LIGAMENT: CPT | Performed by: PHYSICAL MEDICINE & REHABILITATION

## 2019-06-20 ENCOUNTER — TELEPHONE (OUTPATIENT)
Dept: PAIN MEDICINE | Facility: CLINIC | Age: 49
End: 2019-06-20

## 2019-07-05 ENCOUNTER — OFFICE VISIT (OUTPATIENT)
Dept: PAIN MEDICINE | Facility: MEDICAL CENTER | Age: 49
End: 2019-07-05
Payer: COMMERCIAL

## 2019-07-05 VITALS
SYSTOLIC BLOOD PRESSURE: 110 MMHG | WEIGHT: 182 LBS | BODY MASS INDEX: 35.73 KG/M2 | DIASTOLIC BLOOD PRESSURE: 72 MMHG | HEIGHT: 60 IN

## 2019-07-05 DIAGNOSIS — M79.18 MYOFASCIAL PAIN SYNDROME: Primary | ICD-10-CM

## 2019-07-05 PROCEDURE — 99213 OFFICE O/P EST LOW 20 MIN: CPT | Performed by: NURSE PRACTITIONER

## 2019-07-05 NOTE — PROGRESS NOTES
Assessment:  1  Myofascial pain syndrome        Plan:  1  Patient continues with ongoing 50-75% relief of her myofascial complaints from right rhomboid trigger point injections under ultrasound guidance with Dr Michael Mcintyre on June 13, 2019  I discussed with the patient that since there has been moderate to significant improvement in the pain symptoms, we will hold off on any repeat injections at this point in time  However, I reviewed with the patient that if their symptoms should return or worsen,  they should call our office to schedule to discuss repeating the injection  The patient was agreeable and verbalized an understanding  2  Patient will continue over-the-counter Aleve p r n   3  At this time, the patient will follow up on an as-needed basis  The patient was advised to contact the office should their symptoms worsen in the interim  The patient was agreeable and verbalized an understanding  [facility] Prescription Drug Monitoring Program report was reviewed and was appropriate     Other than as stated above, the patient denies any interval changes in medications, medical condition, mental condition, symptoms, or allergies since the last office visit  [employer] software was used to dictate this note  It may contain errors with dictating incorrect words or incorrect spelling  Please contact the provider directly with any questions  History of Present Illness: The patient is a 51 y o  female last seen on 5/16/19 who presents for a follow up office visit in regards to chronic right posterior thoracic pain which has been problematic ever since she fell down some steps about 3 and half years ago and hit her back against the corner of the door frame  She is status post trigger point injections into the right rhomboid musculature under ultrasound guidance with Dr Michael Mcintyre on June 13, 2019 and reports the continual relief of about 50-75% of her pain from this procedure      She currently rates her pain a 4/10 on the numeric pain rating scale  She states her pain is intermittent in nature most bothersome in the evening and at night  She characterizes the pain as sharp and throbbing  I have personally reviewed and/or updated the patient's past medical history, past surgical history, family history, social history, current medications, allergies, and vital signs today  Review of Systems:    Review of Systems   Respiratory: Negative for shortness of breath  Cardiovascular: Negative for chest pain  Gastrointestinal: Negative for constipation, diarrhea, nausea and vomiting  Musculoskeletal: Negative for arthralgias, gait problem, joint swelling and myalgias  Skin: Negative for rash  Neurological: Negative for dizziness, seizures and weakness  All other systems reviewed and are negative          Past Medical History:   Diagnosis Date    Allergic rhinitis     Anxiety     Arthralgia of multiple sites     Back pain     RIGHT LOW BACK PAIN   RESOLVED  [id number]  UPPER BACK PAIN RESOLVED [id number]    Breast nodule     Bronchitis     Cellulitis and abscess of leg     RESOLVED 2015    Chronic pain disorder     Common migraine without aura     Depression     Eczema     Fatigue     Forceps delivery      4 para 3     H/O molar pregnancy, antepartum     Herpes zoster     [id number] RESOLVED    Iron deficiency anemia     Irregular menses     Myalgia     Obesity     Pain in left shoulder     Prior pregnancy with fetal demise     Right shoulder pain     Sebaceous cyst     Sinusitis     Tinea corporis     Trichomoniasis     Vaginitis     Vitamin D deficiency     Yeast UTI        Past Surgical History:   Procedure Laterality Date    ADENOIDECTOMY      APPENDECTOMY      DILATION AND CURETTAGE OF UTERUS      LAPAROSCOPY      OOPHORECTOMY Right     age 28    OVARY SURGERY      TONSILLECTOMY      OVER AGE 12    [facility] Family History   Problem Relation Age of Onset    COPD Father     Lung cancer Father     Diabetes Maternal Grandmother     Diabetes Maternal Aunt     No Known Problems Mother     No Known Problems Daughter     No Known Problems Maternal Grandfather     No Known Problems Half-Sister     No Known Problems Half-Sister     No Known Problems Maternal Aunt     No Known Problems Maternal Aunt     No Known Problems Maternal Aunt     No Known Problems Maternal Aunt     No Known Problems Paternal Aunt     Hypertension Neg Hx     Heart disease Neg Hx     Stroke Neg Hx     Thyroid disease Neg Hx        Social History     Occupational History    Occupation: conselor/ director   Tobacco Use    Smoking status: Never Smoker    Smokeless tobacco: Never Used   Substance and Sexual Activity    Alcohol use: No    Drug use: No    Sexual activity: Yes     Partners: Male     Birth control/protection: Female Sterilization         Current Outpatient Medications:     betamethasone, augmented, (DIPROLENE-AF) 0 05 % cream, , Disp: , Rfl:     cetirizine (ZYRTEC ALLERGY) 10 mg tablet, Take 10 mg by mouth as needed  , Disp: , Rfl:     multivitamin (THERAGRAN) TABS, Take 1 tablet by mouth daily, Disp: , Rfl:     Naproxen Sodium (ALEVE) 220 MG CAPS, Take by mouth, Disp: , Rfl:     Allergies   Allergen Reactions    Red Dye Anaphylaxis and Edema     States occurred after tattoo and had swelling at site  Denies anaphylaxis    Latex Rash    Mold Extract [Trichophyton] Rash    Penicillins Rash       Physical Exam:    /72   Ht 5' (1 524 m)   Wt 82 6 kg (182 lb)   BMI 35 54 kg/m²     Constitutional:normal, well developed, well nourished, alert, in no distress and non-toxic and no overt pain behavior    Eyes:anicteric  HEENT:grossly intact  Neck:supple, symmetric, trachea midline and no masses   Pulmonary:even and unlabored  Cardiovascular:No edema or pitting edema present  Skin:Normal without rashes or lesions and well hydrated  Psychiatric:Mood and affect appropriate  Neurologic:Cranial Nerves II-XII grossly intact  Musculoskeletal:normal       Imaging  No orders to display         No orders of the defined types were placed in this encounter

## 2019-10-22 ENCOUNTER — TELEPHONE (OUTPATIENT)
Dept: PAIN MEDICINE | Facility: MEDICAL CENTER | Age: 49
End: 2019-10-22

## 2019-10-22 NOTE — TELEPHONE ENCOUNTER
Patient called to cx appt for today w/ Dr Rafita Fabian her flight was canceled  She will cb to r/s once she has her schedule in front of her

## 2020-06-05 ENCOUNTER — TELEPHONE (OUTPATIENT)
Dept: FAMILY MEDICINE CLINIC | Facility: CLINIC | Age: 50
End: 2020-06-05

## 2020-06-05 DIAGNOSIS — Z12.39 SCREENING FOR BREAST CANCER: Primary | ICD-10-CM

## 2020-07-01 PROBLEM — Z00.00 WELL ADULT EXAM: Status: ACTIVE | Noted: 2018-01-30

## 2020-07-02 ENCOUNTER — OFFICE VISIT (OUTPATIENT)
Dept: FAMILY MEDICINE CLINIC | Facility: CLINIC | Age: 50
End: 2020-07-02
Payer: COMMERCIAL

## 2020-07-02 VITALS
WEIGHT: 183 LBS | OXYGEN SATURATION: 98 % | RESPIRATION RATE: 14 BRPM | HEIGHT: 60 IN | BODY MASS INDEX: 35.93 KG/M2 | DIASTOLIC BLOOD PRESSURE: 74 MMHG | HEART RATE: 68 BPM | SYSTOLIC BLOOD PRESSURE: 120 MMHG

## 2020-07-02 DIAGNOSIS — J30.1 SEASONAL ALLERGIC RHINITIS DUE TO POLLEN: ICD-10-CM

## 2020-07-02 DIAGNOSIS — Z00.00 WELL ADULT EXAM: Primary | ICD-10-CM

## 2020-07-02 DIAGNOSIS — Z13.6 SCREENING FOR CARDIOVASCULAR CONDITION: ICD-10-CM

## 2020-07-02 DIAGNOSIS — E66.01 SEVERE OBESITY (BMI 35.0-35.9 WITH COMORBIDITY) (HCC): ICD-10-CM

## 2020-07-02 DIAGNOSIS — D50.8 OTHER IRON DEFICIENCY ANEMIA: ICD-10-CM

## 2020-07-02 DIAGNOSIS — Z23 NEED FOR TDAP VACCINATION: ICD-10-CM

## 2020-07-02 PROCEDURE — 3008F BODY MASS INDEX DOCD: CPT | Performed by: FAMILY MEDICINE

## 2020-07-02 PROCEDURE — 90471 IMMUNIZATION ADMIN: CPT

## 2020-07-02 PROCEDURE — 99396 PREV VISIT EST AGE 40-64: CPT | Performed by: FAMILY MEDICINE

## 2020-07-02 PROCEDURE — 90715 TDAP VACCINE 7 YRS/> IM: CPT

## 2020-07-02 NOTE — PROGRESS NOTES
Chief Complaint   Patient presents with   Kaela Henriquez Physical Exam     Annual Physical     Health Maintenance   Topic Date Due    HIV Screening  03/23/1986    BMI: Followup Plan  03/23/1989    Annual Physical  04/11/2020    Influenza Vaccine  07/01/2020    MAMMOGRAM  05/28/2020    Cervical Cancer Screening  01/30/2021    BMI: Adult  07/02/2021    DTaP,Tdap,and Td Vaccines (2 - Td) 07/02/2030    Pneumococcal Vaccine: 65+ Years (1 of 2 - PCV13) 03/23/2036    Pneumococcal Vaccine: Pediatrics (0 to 5 Years) and At-Risk Patients (6 to 59 Years)  Aged Out    HIB Vaccine  Aged Out    Hepatitis B Vaccine  Aged Out    IPV Vaccine  Aged Out    Hepatitis A Vaccine  Aged Out    Meningococcal ACWY Vaccine  Aged Out    HPV Vaccine  Aged Out     BMI Counseling: Body mass index is 35 74 kg/m²  The BMI is above normal  Nutrition recommendations include reducing portion sizes, decreasing overall calorie intake, 3-5 servings of fruits/vegetables daily, reducing fast food intake, consuming healthier snacks, decreasing soda and/or juice intake, moderation in carbohydrate intake, increasing intake of lean protein, reducing intake of saturated fat and trans fat and reducing intake of cholesterol  Exercise recommendations include exercising 3-5 times per week  Assessment/Plan:    Well adult exam  Recommended to follow a well-balanced diet, regular exercise  Tdap administered today  Check labs  Schedule screening mammogram     Follow-up with gynecology for routine pelvic exam this month as scheduled  Iron deficiency anemia  Patient has history of iron deficiency anemia due to heavy menstrual cycles  Will check CBC with dif, Iron panel  Severe obesity (BMI 35 0-35 9 with comorbidity) ([address] )  Discussed dietary modifications with patient  Recommended to follow a low carb, low-fat diet, continue regular exercise  Patient declined to follow-up with weight management  Allergic rhinitis  Symptoms are stable  Take Zyrtec 10 mg daily PRN during allergy seasons  Schedule physical exam in 1 year  Call office with any acute problems  Diagnoses and all orders for this visit:    Well adult exam  -     Comprehensive metabolic panel; Future  -     TSH, 3rd generation with Free T4 reflex; Future    Screening for cardiovascular condition  -     Lipid panel; Future    Other iron deficiency anemia  -     CBC and differential; Future  -     Iron Panel (Includes Ferritin, Iron Sat%, Iron, and TIBC); Future    Seasonal allergic rhinitis due to pollen    Severe obesity (BMI 35 0-35 9 with comorbidity) (HCC)  -     TSH, 3rd generation with Free T4 reflex; Future    Need for Tdap vaccination  -     TDAP VACCINE GREATER THAN OR EQUAL TO 6YO IM          Subjective:      Patient ID: Erich Esteban is a 53 y o  female  HPI       Patient presents for well adult physical exam       Patient states that she feels well overall  Denies chest pain, shortness of breath, dizziness  She tries to eat healthy, exercises regularly but has difficulty to lose weight  Weight has been stable since last year  Patient was seen by weight management in [facility] previously but did not see any significant results after taking dietary supplements  Last blood test done in December 2018  Patient has history of iron deficiency anemia due to heavy menstrual cycles, seasonal allergies, chronic right-sided thoracic back pain  She was followed by pain management Dr Marylen Begun, had physical therapy with some improvement in thoracic back pain  Patient scheduled appointment with gynecology for routine pelvic exam on July 22, 2020  Last mammogram done in May 2019  Patient is going to schedule annual screening mammogram this summer  Denies tobacco use      The following portions of the patient's history were reviewed and updated as appropriate: allergies, past family history, past medical history, past social history, past surgical history and problem list     Review of Systems   Constitutional: Negative for activity change, appetite change, chills, fatigue and fever  HENT: Negative for congestion, dental problem, ear pain, hearing loss, mouth sores, nosebleeds, sore throat, tinnitus and trouble swallowing  Eyes: Negative for pain, discharge, redness, itching and visual disturbance  Respiratory: Negative for cough, chest tightness, shortness of breath and wheezing  Cardiovascular: Negative for chest pain, palpitations and leg swelling  Gastrointestinal: Negative for abdominal pain, blood in stool, constipation, diarrhea, nausea and vomiting  Genitourinary: Positive for menstrual problem (heavy menstrual cycles)  Negative for difficulty urinating, dysuria, flank pain, frequency, pelvic pain and vaginal discharge  Musculoskeletal: Positive for back pain (chronic right-sided thoracic back pain )  Negative for arthralgias, joint swelling, myalgias and neck pain  Skin: Negative for rash and wound  Neurological: Negative for dizziness, syncope and headaches  Hematological: Negative  Psychiatric/Behavioral: Negative  Objective:      /74 (BP Location: Left arm, Patient Position: Sitting, Cuff Size: Adult)   Pulse 68   Resp 14   Ht 5' (1 524 m)   Wt 83 kg (183 lb)   SpO2 98%   BMI 35 74 kg/m²          Physical Exam   Constitutional: She appears well-developed and well-nourished  Obese   HENT:   Head: Normocephalic and atraumatic  Right Ear: External ear normal    Left Ear: External ear normal    Eyes: Pupils are equal, round, and reactive to light  Conjunctivae are normal    Neck: Normal range of motion  Neck supple  No JVD present  Cardiovascular: Normal rate, regular rhythm and normal heart sounds  No murmur heard  No BL LE edema   Pulmonary/Chest: Effort normal and breath sounds normal    Abdominal: Soft  Bowel sounds are normal  There is no tenderness     Musculoskeletal: Normal range of motion  She exhibits no edema, tenderness or deformity  Skin: Skin is warm and dry  No rash noted  Psychiatric: She has a normal mood and affect  Nursing note and vitals reviewed

## 2020-07-02 NOTE — ASSESSMENT & PLAN NOTE
Discussed dietary modifications with patient  Recommended to follow a low carb, low-fat diet, continue regular exercise  Patient declined to follow-up with weight management

## 2020-07-02 NOTE — ASSESSMENT & PLAN NOTE
Patient has history of iron deficiency anemia due to heavy menstrual cycles  Will check CBC with dif, Iron panel

## 2020-07-02 NOTE — ASSESSMENT & PLAN NOTE
Recommended to follow a well-balanced diet, regular exercise  Tdap administered today  Check labs  Schedule screening mammogram     Follow-up with gynecology for routine pelvic exam this month as scheduled

## 2020-07-02 NOTE — PROGRESS NOTES
Assessment/Plan     Healthy female exam  ***     1  ***  2  Patient Counseling:  --Nutrition: Stressed importance of moderation in sodium/caffeine intake, saturated fat and cholesterol, caloric balance, sufficient intake of fresh fruits, vegetables, fiber, calcium, iron, and 1 mg of folate supplement per day (for females capable of pregnancy)  --Discussed the issue of estrogen replacement, calcium supplement, and the daily use of baby aspirin  --Exercise: Stressed the importance of regular exercise  --Substance Abuse: Discussed cessation/primary prevention of tobacco, alcohol, or other drug use; driving or other dangerous activities under the influence; availability of treatment for abuse  --Sexuality: Discussed sexually transmitted diseases, partner selection, use of condoms, avoidance of unintended pregnancy  and contraceptive alternatives  --Injury prevention: Discussed safety belts, safety helmets, smoke detector, smoking near bedding or upholstery  --Dental health: Discussed importance of regular tooth brushing, flossing, and dental visits  --Immunizations reviewed  --Discussed benefits of screening colonoscopy  --After hours service discussed with patient    3  Discussed the patient's BMI with her  The BMI {[facility]57506}  4  Follow up {follow-up interval:11145}  Shelli Escobar is a 53 y o  female and is here for a comprehensive physical exam  The patient reports {problems:40307}  Do you take any herbs or supplements that were not prescribed by a doctor? {yes/no/not asked:9010}  Are you taking calcium supplements? {yes/no:033374}  Are you taking aspirin daily? {yes/no:261708}     History:  {gu history select gender:33992}    {History Review:81076}    Review of Systems  Do you have pain that bothers you in your daily life? {yes/no/not asked:9010}  {ros; complete:52365}  Objective     {exam; complete:64318}

## 2020-07-20 ENCOUNTER — ANNUAL EXAM (OUTPATIENT)
Dept: OBGYN CLINIC | Facility: CLINIC | Age: 50
End: 2020-07-20
Payer: COMMERCIAL

## 2020-07-20 VITALS
SYSTOLIC BLOOD PRESSURE: 118 MMHG | DIASTOLIC BLOOD PRESSURE: 72 MMHG | TEMPERATURE: 97.8 F | WEIGHT: 184 LBS | BODY MASS INDEX: 35.94 KG/M2

## 2020-07-20 DIAGNOSIS — N94.6 DYSMENORRHEA: ICD-10-CM

## 2020-07-20 DIAGNOSIS — Z12.31 ENCOUNTER FOR SCREENING MAMMOGRAM FOR MALIGNANT NEOPLASM OF BREAST: ICD-10-CM

## 2020-07-20 DIAGNOSIS — Z01.419 ENCOUNTER FOR GYNECOLOGICAL EXAMINATION (GENERAL) (ROUTINE) WITHOUT ABNORMAL FINDINGS: Primary | ICD-10-CM

## 2020-07-20 PROCEDURE — S0612 ANNUAL GYNECOLOGICAL EXAMINA: HCPCS | Performed by: NURSE PRACTITIONER

## 2020-07-20 NOTE — ASSESSMENT & PLAN NOTE
Normal findings on routine gyn exam  Advised monthly SBE, annual CBE and annual screening mammo  Reviewed ASCCP guidelines and recommended pap with cotesting q3 yrs for this low risk patient; this was noted to be up to date  STI testing was offered and the patient declines; she reports low risk  The patient reports BTL for contraception  Reviewed perimenopause considerations and reasons to call  Diet/activity recommendations:  Encouraged daily Ca++ and vitamin D intake as well as daily weight bearing exercise for promotion of bone health  RTO in one year or sooner PRN

## 2020-07-20 NOTE — ASSESSMENT & PLAN NOTE
C/o low back pain with menses x3 mos  Improves with NSAIDs PRN  Normal exam today   Recommended pelvic US with eval adnexa

## 2020-07-20 NOTE — PROGRESS NOTES
Assessment/Plan:    Encounter for gynecological examination (general) (routine) without abnormal findings  Normal findings on routine gyn exam  Advised monthly SBE, annual CBE and annual screening mammo  Reviewed ASCCP guidelines and recommended pap with cotesting q3 yrs for this low risk patient; this was noted to be up to date  STI testing was offered and the patient declines; she reports low risk  The patient reports BTL for contraception  Reviewed perimenopause considerations and reasons to call  Diet/activity recommendations:  Encouraged daily Ca++ and vitamin D intake as well as daily weight bearing exercise for promotion of bone health  RTO in one year or sooner PRN  Dysmenorrhea  C/o low back pain with menses x3 mos  Improves with NSAIDs PRN  Normal exam today  Recommended pelvic US with eval adnexa        Diagnoses and all orders for this visit:    Encounter for gynecological examination (general) (routine) without abnormal findings    Encounter for screening mammogram for malignant neoplasm of breast  -     Mammo screening bilateral w cad; Future    Dysmenorrhea  -     US pelvis complete w transvaginal; Future          Subjective:      Patient ID: Clementine Castro is a 53 y o  female  This patient presents for routine annual gyn exam    Medically stable  C/o low back pain with menses x3 mos  Resolves with ibuprofen  Sees chiro - overdue for adjustment  She denies acute gyn complaints  Menses are regular and light to mod  She denies pelvic pain, breast concerns, abnormal discharge, bowel/bladder dysfunction, depression/anxiety   and monogamous  She denies STI concerns  BTL for contraception  Kids are well       The following portions of the patient's history were reviewed and updated as appropriate: allergies, current medications, past family history, past medical history, past social history, past surgical history and problem list     Review of Systems   Constitutional: Negative  Respiratory: Negative  Cardiovascular: Negative  Gastrointestinal: Negative  Genitourinary: Negative  Musculoskeletal: Positive for back pain  Skin: Negative  Neurological: Negative  Psychiatric/Behavioral: Negative  Objective:      /72   Temp 97 8 °F (36 6 °C)   Wt 83 5 kg (184 lb)   LMP 07/19/2020   BMI 35 94 kg/m²          Physical Exam   Constitutional: She is oriented to person, place, and time  She appears well-developed and well-nourished  HENT:   Head: Normocephalic and atraumatic  Eyes: Pupils are equal, round, and reactive to light  EOM are normal    Neck: Normal range of motion  Neck supple  No thyromegaly present  Cardiovascular: Normal rate, regular rhythm and normal heart sounds  Pulmonary/Chest: Effort normal and breath sounds normal  No respiratory distress  She has no wheezes  She has no rales  She exhibits no mass, no tenderness and no deformity  Right breast exhibits no inverted nipple, no mass, no nipple discharge, no skin change and no tenderness  Left breast exhibits no inverted nipple, no mass, no nipple discharge, no skin change and no tenderness  No breast tenderness or discharge  Breasts are symmetrical    Abdominal: Soft  She exhibits no distension and no mass  There is no splenomegaly or hepatomegaly  There is no tenderness  There is no rebound and no guarding  Genitourinary: Rectum normal and uterus normal  No breast tenderness or discharge  No labial fusion  There is no rash, tenderness, lesion or injury on the right labia  There is no rash, tenderness, lesion or injury on the left labia  Cervix exhibits no motion tenderness, no discharge and no friability  Right adnexum displays no mass, no tenderness and no fullness  Left adnexum displays no mass, no tenderness and no fullness  There is bleeding in the vagina  No erythema or tenderness in the vagina  No foreign body in the vagina  No vaginal discharge found         Musculoskeletal: Normal range of motion  Lymphadenopathy:     She has no cervical adenopathy  She has no axillary adenopathy  Neurological: She is alert and oriented to person, place, and time  No cranial nerve deficit  Skin: Skin is warm and dry  No rash noted  No cyanosis  Nails show no clubbing  Psychiatric: She has a normal mood and affect   Her speech is normal and behavior is normal  Judgment and thought content normal  Cognition and memory are normal

## 2020-07-21 LAB
ALBUMIN SERPL-MCNC: 3.7 G/DL (ref 3.6–5.1)
ALBUMIN/GLOB SERPL: 1.4 (CALC) (ref 1–2.5)
ALP SERPL-CCNC: 87 U/L (ref 31–125)
ALT SERPL-CCNC: 15 U/L (ref 6–29)
AST SERPL-CCNC: 15 U/L (ref 10–35)
BASOPHILS # BLD AUTO: 23 CELLS/UL (ref 0–200)
BASOPHILS NFR BLD AUTO: 0.3 %
BILIRUB SERPL-MCNC: 0.4 MG/DL (ref 0.2–1.2)
BUN SERPL-MCNC: 10 MG/DL (ref 7–25)
BUN/CREAT SERPL: ABNORMAL (CALC) (ref 6–22)
CALCIUM SERPL-MCNC: 8.4 MG/DL (ref 8.6–10.2)
CHLORIDE SERPL-SCNC: 105 MMOL/L (ref 98–110)
CHOLEST SERPL-MCNC: 165 MG/DL
CHOLEST/HDLC SERPL: 3.4 (CALC)
CO2 SERPL-SCNC: 28 MMOL/L (ref 20–32)
CREAT SERPL-MCNC: 0.63 MG/DL (ref 0.5–1.1)
EOSINOPHIL # BLD AUTO: 68 CELLS/UL (ref 15–500)
EOSINOPHIL NFR BLD AUTO: 0.9 %
ERYTHROCYTE [DISTWIDTH] IN BLOOD BY AUTOMATED COUNT: 15.2 % (ref 11–15)
GLOBULIN SER CALC-MCNC: 2.7 G/DL (CALC) (ref 1.9–3.7)
GLUCOSE SERPL-MCNC: 91 MG/DL (ref 65–99)
HCT VFR BLD AUTO: 35.7 % (ref 35–45)
HDLC SERPL-MCNC: 48 MG/DL
HGB BLD-MCNC: 11.5 G/DL (ref 11.7–15.5)
IRON SATN MFR SERPL: 9 % (CALC) (ref 16–45)
IRON SERPL-MCNC: 30 MCG/DL (ref 40–190)
LDLC SERPL CALC-MCNC: 103 MG/DL (CALC)
LYMPHOCYTES # BLD AUTO: 1862 CELLS/UL (ref 850–3900)
LYMPHOCYTES NFR BLD AUTO: 24.5 %
MCH RBC QN AUTO: 26.6 PG (ref 27–33)
MCHC RBC AUTO-ENTMCNC: 32.2 G/DL (ref 32–36)
MCV RBC AUTO: 82.4 FL (ref 80–100)
MONOCYTES # BLD AUTO: 851 CELLS/UL (ref 200–950)
MONOCYTES NFR BLD AUTO: 11.2 %
NEUTROPHILS # BLD AUTO: 4796 CELLS/UL (ref 1500–7800)
NEUTROPHILS NFR BLD AUTO: 63.1 %
NONHDLC SERPL-MCNC: 117 MG/DL (CALC)
PLATELET # BLD AUTO: 268 THOUSAND/UL (ref 140–400)
PMV BLD REES-ECKER: 11.2 FL (ref 7.5–12.5)
POTASSIUM SERPL-SCNC: 4.1 MMOL/L (ref 3.5–5.3)
PROT SERPL-MCNC: 6.4 G/DL (ref 6.1–8.1)
RBC # BLD AUTO: 4.33 MILLION/UL (ref 3.8–5.1)
SL AMB EGFR AFRICAN AMERICAN: 122 ML/MIN/1.73M2
SL AMB EGFR NON AFRICAN AMERICAN: 105 ML/MIN/1.73M2
SODIUM SERPL-SCNC: 138 MMOL/L (ref 135–146)
TIBC SERPL-MCNC: 316 MCG/DL (CALC) (ref 250–450)
TRIGL SERPL-MCNC: 49 MG/DL
TSH SERPL-ACNC: 2.38 MIU/L
WBC # BLD AUTO: 7.6 THOUSAND/UL (ref 3.8–10.8)

## 2020-07-22 DIAGNOSIS — D50.8 OTHER IRON DEFICIENCY ANEMIA: Primary | ICD-10-CM

## 2020-07-22 RX ORDER — FERROUS SULFATE 325(65) MG
325 TABLET ORAL 2 TIMES DAILY
Qty: 60 TABLET | Refills: 3
Start: 2020-07-22 | End: 2021-08-16 | Stop reason: ALTCHOICE

## 2020-07-22 RX ORDER — ASCORBIC ACID 500 MG
500 TABLET ORAL DAILY
Qty: 30 TABLET | Refills: 3
Start: 2020-07-22 | End: 2021-08-16 | Stop reason: ALTCHOICE

## 2020-07-25 ENCOUNTER — HOSPITAL ENCOUNTER (OUTPATIENT)
Dept: ULTRASOUND IMAGING | Facility: HOSPITAL | Age: 50
Discharge: HOME | End: 2020-07-25
Payer: COMMERCIAL

## 2020-07-25 DIAGNOSIS — N94.6 DYSMENORRHEA: ICD-10-CM

## 2020-07-25 PROCEDURE — 76830 TRANSVAGINAL US NON-OB: CPT

## 2020-07-25 PROCEDURE — 76856 US EXAM PELVIC COMPLETE: CPT

## 2020-08-03 ENCOUNTER — TELEPHONE (OUTPATIENT)
Dept: OBGYN CLINIC | Facility: CLINIC | Age: 50
End: 2020-08-03

## 2020-08-03 NOTE — TELEPHONE ENCOUNTER
----- Message from Mary Calvillo Linda  sent at 7/30/2020  4:36 PM EDT -----  Pelvic US was ordered for dysmenorrhea  This is normal and benign   Uterus contains one tiny fibroid, less than 1cm in size and not related to dysmenorrhea   Ovaries are normal bilaterally   If she is interested in discussing options for medical management please offer an office visit to review   If she prefers NSAIDs PRN and expectant management that is reasonable

## 2020-08-03 NOTE — TELEPHONE ENCOUNTER
Pt contacted and advised as directed  Pt stated she prefers NSAID's Prn and she will manage on her own  Pt was grateful for the call with all the information

## 2020-09-28 ENCOUNTER — HOSPITAL ENCOUNTER (OUTPATIENT)
Dept: MAMMOGRAPHY | Facility: MEDICAL CENTER | Age: 50
Discharge: HOME | End: 2020-09-28
Payer: COMMERCIAL

## 2020-09-28 VITALS — BODY MASS INDEX: 36.12 KG/M2 | WEIGHT: 184 LBS | HEIGHT: 60 IN

## 2020-09-28 DIAGNOSIS — Z12.31 ENCOUNTER FOR SCREENING MAMMOGRAM FOR MALIGNANT NEOPLASM OF BREAST: ICD-10-CM

## 2020-09-28 PROCEDURE — 77063 BREAST TOMOSYNTHESIS BI: CPT

## 2020-09-28 PROCEDURE — 77067 SCR MAMMO BI INCL CAD: CPT

## 2021-04-09 ENCOUNTER — TELEPHONE (OUTPATIENT)
Dept: OBGYN CLINIC | Facility: CLINIC | Age: 51
End: 2021-04-09

## 2021-04-09 NOTE — TELEPHONE ENCOUNTER
Spoke with pt  lmp 3/19, 3 days ago she began experiencing rlq pain and today states it is constant and rates it as an 8  Has been taking ibf and will go down to a five  States she has no rt ovary or appendix  No c/o u/a probs  Had a recent English, but symptoms disappeared with  prep otc  req further instructions?

## 2021-04-09 NOTE — TELEPHONE ENCOUNTER
Sounds severe - not sure that anyone can see her today in the office so perhaps it would be calvillo for her to go to the ED for further evaluation

## 2021-04-09 NOTE — TELEPHONE ENCOUNTER
Patient is having severe right sided pain patient states pain level about 8 without motrin with motrin about a 5  Patient does not have ovary or appendix  Patient was having yeast infections prior to this

## 2021-05-13 ENCOUNTER — RA CDI HCC (OUTPATIENT)
Dept: OTHER | Facility: HOSPITAL | Age: 51
End: 2021-05-13

## 2021-05-13 NOTE — PROGRESS NOTES
[facility]          Chart reviewed, no opportunity found: CHART REVIEWED, NO OPPORTUNITY FOUND              Patients insurance company:  [email or web address] [facility] (Medicare Advantage and Commercial)

## 2021-05-20 ENCOUNTER — OFFICE VISIT (OUTPATIENT)
Dept: FAMILY MEDICINE CLINIC | Facility: CLINIC | Age: 51
End: 2021-05-20
Payer: COMMERCIAL

## 2021-05-20 VITALS
OXYGEN SATURATION: 96 % | HEART RATE: 76 BPM | HEIGHT: 60 IN | WEIGHT: 186 LBS | SYSTOLIC BLOOD PRESSURE: 108 MMHG | DIASTOLIC BLOOD PRESSURE: 72 MMHG | TEMPERATURE: 98.9 F | BODY MASS INDEX: 36.52 KG/M2 | RESPIRATION RATE: 14 BRPM

## 2021-05-20 DIAGNOSIS — L98.9 SKIN LESION OF LEFT LEG: ICD-10-CM

## 2021-05-20 DIAGNOSIS — M54.6 CHRONIC RIGHT-SIDED THORACIC BACK PAIN: ICD-10-CM

## 2021-05-20 DIAGNOSIS — Z12.11 SCREENING FOR COLON CANCER: ICD-10-CM

## 2021-05-20 DIAGNOSIS — R23.2 HOT FLASHES: ICD-10-CM

## 2021-05-20 DIAGNOSIS — J30.1 SEASONAL ALLERGIC RHINITIS DUE TO POLLEN: ICD-10-CM

## 2021-05-20 DIAGNOSIS — Z12.11 SCREENING FOR COLORECTAL CANCER: ICD-10-CM

## 2021-05-20 DIAGNOSIS — E66.01 SEVERE OBESITY (BMI 35.0-35.9 WITH COMORBIDITY) (HCC): ICD-10-CM

## 2021-05-20 DIAGNOSIS — Z12.12 SCREENING FOR COLORECTAL CANCER: ICD-10-CM

## 2021-05-20 DIAGNOSIS — G89.29 CHRONIC RIGHT-SIDED THORACIC BACK PAIN: ICD-10-CM

## 2021-05-20 DIAGNOSIS — D50.0 IRON DEFICIENCY ANEMIA DUE TO CHRONIC BLOOD LOSS: Primary | ICD-10-CM

## 2021-05-20 PROCEDURE — 1036F TOBACCO NON-USER: CPT | Performed by: FAMILY MEDICINE

## 2021-05-20 PROCEDURE — 3725F SCREEN DEPRESSION PERFORMED: CPT | Performed by: FAMILY MEDICINE

## 2021-05-20 PROCEDURE — 99214 OFFICE O/P EST MOD 30 MIN: CPT | Performed by: FAMILY MEDICINE

## 2021-05-20 PROCEDURE — 3008F BODY MASS INDEX DOCD: CPT | Performed by: FAMILY MEDICINE

## 2021-05-20 RX ORDER — METHOCARBAMOL 500 MG/1
TABLET, FILM COATED ORAL
Qty: 30 TABLET | Refills: 0 | Status: SHIPPED | OUTPATIENT
Start: 2021-05-20 | End: 2021-08-16 | Stop reason: ALTCHOICE

## 2021-05-20 RX ORDER — NAPROXEN 500 MG/1
500 TABLET ORAL 2 TIMES DAILY WITH MEALS
Qty: 30 TABLET | Refills: 0 | Status: SHIPPED | OUTPATIENT
Start: 2021-05-20 | End: 2021-08-16 | Stop reason: ALTCHOICE

## 2021-05-20 NOTE — PROGRESS NOTES
Chief Complaint   Patient presents with    Follow-up     10 month follow up     Health Maintenance   Topic Date Due    COVID-19 Vaccine (1) Never done    HIV Screening  Never done    Cervical Cancer Screening  01/30/2021    Colorectal Cancer Screening  Never done    BMI: Followup Plan  07/02/2021    Annual Physical  07/02/2021    Influenza Vaccine (Season Ended) 09/01/2021    MAMMOGRAM  09/28/2021    BMI: Adult  05/20/2022    DTaP,Tdap,and Td Vaccines (2 - Td) 07/02/2030    Pneumococcal Vaccine: Pediatrics (0 to 5 Years) and At-Risk Patients (6 to 59 Years)  Aged Out    HIB Vaccine  Aged Out    Hepatitis B Vaccine  Aged Out    IPV Vaccine  Aged Out    Hepatitis A Vaccine  Aged Out    Meningococcal ACWY Vaccine  Aged Out    HPV Vaccine  Aged Out         BMI Counseling: Body mass index is 36 33 kg/m²  The BMI is above normal  Nutrition recommendations include decreasing portion sizes, encouraging healthy choices of fruits and vegetables, decreasing fast food intake, consuming healthier snacks, limiting drinks that contain sugar, moderation in carbohydrate intake, increasing intake of lean protein, reducing intake of saturated and trans fat and reducing intake of cholesterol  Exercise recommendations include exercising 3-5 times per week  No pharmacotherapy was ordered  Assessment/Plan:    Iron deficiency anemia due to chronic blood loss  Patient has heavy menstrual cycles  Last blood test done in July 2020  Patient stopped taking Ferrous sulfate last year  Takes multivitamins daily  Will recheck CBC with dif, iron panel  Severe obesity (BMI 35 0-35 9 with comorbidity) ([address] )  Recommended to follow a low carb, low-fat diet, regular exercise, work on weight reduction  Hot flashes  Check TSH  Encouraged regular exercise  Patient started taking OTC supplement for hot flashes  Recommended to follow-up with gynecology  Allergic rhinitis  Symptoms are stable      Take Zyrtec 10 mg daily during allergy seasons  Chronic right-sided thoracic back pain  Patient c/o flare up of chronic right-sided thoracic back pain  She goes to chiropractor 2 times per week  Start Naproxen 500 mg 1 tablet twice daily with food for 7-10 days  Take Methocarbamol 500 mg 1 tablet every 8 hours PRN for back muscle spasms  Skin lesion of left leg  Patient developed skin lesion on left distal calf after 12 laser treatments for tattoo removal     Will refer to plastic surgeon for evaluation  HM: recommended to schedule screening colonoscopy  Discussed COVID and Shingrix vaccination  Patient had mammogram in September 2020  Follow-up with gynecology for pelvic exam and Pap smear in July  Schedule physical exam in 6 months  Diagnoses and all orders for this visit:    Iron deficiency anemia due to chronic blood loss  -     CBC and differential; Future  -     Iron Panel (Includes Ferritin, Iron Sat%, Iron, and TIBC); Future    Severe obesity (BMI 35 0-35 9 with comorbidity) (HCC)    Hot flashes  -     TSH, 3rd generation with Free T4 reflex; Future    Seasonal allergic rhinitis due to pollen    Chronic right-sided thoracic back pain  -     naproxen (NAPROSYN) 500 mg tablet; Take 1 tablet (500 mg total) by mouth 2 (two) times a day with meals  -     methocarbamol (ROBAXIN) 500 mg tablet; Take 1 tab  every 8 hr  PRN for back muscle spasms    Screening for colorectal cancer    Skin lesion of left leg  -     Ambulatory referral to Plastic Surgery; Future    Screening for colon cancer  -     Ambulatory referral to Colorectal Surgery; Future    Other orders  -     Cancel: Ambulatory referral to Obstetrics / Gynecology; Future  -     Cancel: Cologuard; Future          Subjective:      Patient ID: Rafael Hitchcock is a 49 y o  female  HPI     Patient presents for routine follow-up office visit        PMHx: iron deficiency anemia due to heavy menstrual cycles, seasonal allergies, chronic right-sided thoracic back pain, obesity  Reviewed current medications  Patient c/o flare up of chronic right-sided thoracic back pain  She would like to get prescription for anti-inflammatory medication  She was previously seen by pain management Dr Berenice Blood, had physical therapy  Currently seeing a chiropractor twice per week  Patient has history of iron deficiency anemia secondary to heavy menstrual cycles  She had pelvic exam done by gynecology CRNP in July 2020, had negative Pap smear in January 2018  C/o hot flashes but gets her periods regularly every month  Last blood test done in July 2020  TSH was 2 38, Iron 30, Hb 11 5  Patient took Ferrous sulfate 325 mg twice daily for a few months last year, then started taking multivitamin daily  Patient had normal mammogram in September 2020  Denies tobacco, alcohol use  Patient tries to walk a few times per week, works on weight reduction  Patient had 12 laser treatments for tattoo removal on left distal calf  She developed scaly painful area on left distal calf, would like to see a plastic surgeon  The following portions of the patient's history were reviewed and updated as appropriate: allergies, past family history, past medical history, past social history, past surgical history and problem list     Review of Systems   Constitutional: Negative for activity change, appetite change, chills, fatigue and fever  HENT: Negative for congestion, ear pain, hearing loss, nosebleeds, sinus pressure, sore throat, tinnitus and trouble swallowing  Eyes: Negative for pain, discharge, redness, itching and visual disturbance  Respiratory: Negative for cough, chest tightness, shortness of breath and wheezing  Cardiovascular: Negative for chest pain, palpitations and leg swelling  Gastrointestinal: Negative for abdominal pain, blood in stool, constipation, diarrhea, nausea and vomiting     Genitourinary: Positive for menstrual problem (heavy menstrual cycles)  Negative for difficulty urinating, dysuria, flank pain, frequency and hematuria  Musculoskeletal: Positive for back pain (right-sided thoracic back pain)  Negative for joint swelling and neck pain  Skin: Negative for rash  Neurological: Negative for dizziness, syncope and headaches  Hematological: Negative  Psychiatric/Behavioral: Positive for sleep disturbance  Negative for dysphoric mood  The patient is not nervous/anxious  Objective:      /72 (BP Location: Left arm, Patient Position: Sitting, Cuff Size: Large)   Pulse 76   Temp 98 9 °F (37 2 °C) (Tympanic)   Resp 14   Ht 5' (1 524 m)   Wt 84 4 kg (186 lb)   SpO2 96%   BMI 36 33 kg/m²          Physical Exam  Vitals signs and nursing note reviewed  Constitutional:       Appearance: Normal appearance  She is obese  HENT:      Head: Normocephalic and atraumatic  Eyes:      Conjunctiva/sclera: Conjunctivae normal       Pupils: Pupils are equal, round, and reactive to light  Neck:      Musculoskeletal: Normal range of motion and neck supple  No muscular tenderness  Vascular: No carotid bruit  Cardiovascular:      Rate and Rhythm: Normal rate and regular rhythm  Heart sounds: No murmur  Pulmonary:      Effort: Pulmonary effort is normal       Breath sounds: Normal breath sounds  Abdominal:      General: Bowel sounds are normal  There is no distension  Palpations: Abdomen is soft  Tenderness: There is no abdominal tenderness  Musculoskeletal:      Right lower leg: No edema  Left lower leg: No edema  Comments: Back exam: tenderness over right mid-thoracic area   Lymphadenopathy:      Cervical: No cervical adenopathy  Skin:     General: Skin is warm and dry  Findings: No rash  Comments: Scaly skin lesion 2 5 x 3 cm on lateral aspect left distal calf   Neurological:      Mental Status: She is alert     Psychiatric:         Mood and Affect: Mood normal

## 2021-05-20 NOTE — ASSESSMENT & PLAN NOTE
Patient developed skin lesion on left distal calf after 12 laser treatments for tattoo removal     Will refer to plastic surgeon for evaluation

## 2021-05-20 NOTE — ASSESSMENT & PLAN NOTE
Patient c/o flare up of chronic right-sided thoracic back pain  She goes to chiropractor 2 times per week  Start Naproxen 500 mg 1 tablet twice daily with food for 7-10 days  Take Methocarbamol 500 mg 1 tablet every 8 hours PRN for back muscle spasms

## 2021-05-20 NOTE — ASSESSMENT & PLAN NOTE
Check TSH  Encouraged regular exercise  Patient started taking OTC supplement for hot flashes  Recommended to follow-up with gynecology

## 2021-05-20 NOTE — ASSESSMENT & PLAN NOTE
Patient has heavy menstrual cycles  Last blood test done in July 2020  Patient stopped taking Ferrous sulfate last year  Takes multivitamins daily  Will recheck CBC with dif, iron panel

## 2021-08-12 PROBLEM — R10.13 EPIGASTRIC PAIN: Status: ACTIVE | Noted: 2021-08-12

## 2021-08-16 ENCOUNTER — OFFICE VISIT (OUTPATIENT)
Dept: FAMILY MEDICINE CLINIC | Facility: CLINIC | Age: 51
End: 2021-08-16
Payer: COMMERCIAL

## 2021-08-16 VITALS
HEART RATE: 68 BPM | SYSTOLIC BLOOD PRESSURE: 122 MMHG | RESPIRATION RATE: 16 BRPM | BODY MASS INDEX: 36.56 KG/M2 | HEIGHT: 60 IN | DIASTOLIC BLOOD PRESSURE: 92 MMHG | TEMPERATURE: 98.2 F | WEIGHT: 186.2 LBS

## 2021-08-16 DIAGNOSIS — F41.9 ANXIETY: Primary | ICD-10-CM

## 2021-08-16 DIAGNOSIS — G47.09 OTHER INSOMNIA: ICD-10-CM

## 2021-08-16 PROCEDURE — 1036F TOBACCO NON-USER: CPT | Performed by: FAMILY MEDICINE

## 2021-08-16 PROCEDURE — 3008F BODY MASS INDEX DOCD: CPT | Performed by: FAMILY MEDICINE

## 2021-08-16 PROCEDURE — 99213 OFFICE O/P EST LOW 20 MIN: CPT | Performed by: FAMILY MEDICINE

## 2021-08-16 RX ORDER — BUSPIRONE HYDROCHLORIDE 15 MG/1
15 TABLET ORAL 3 TIMES DAILY
Qty: 30 TABLET | Refills: 1 | Status: SHIPPED | OUTPATIENT
Start: 2021-08-16 | End: 2021-08-16

## 2021-08-16 RX ORDER — BUSPIRONE HYDROCHLORIDE 15 MG/1
15 TABLET ORAL 2 TIMES DAILY
Qty: 60 TABLET | Refills: 1 | Status: SHIPPED | OUTPATIENT
Start: 2021-08-16 | End: 2022-04-26 | Stop reason: ALTCHOICE

## 2021-08-16 RX ORDER — TRAZODONE HYDROCHLORIDE 50 MG/1
50 TABLET ORAL
Qty: 30 TABLET | Refills: 1 | Status: SHIPPED | OUTPATIENT
Start: 2021-08-16 | End: 2022-04-26 | Stop reason: ALTCHOICE

## 2021-08-16 NOTE — PROGRESS NOTES
Assessment/Plan:    No problem-specific Assessment & Plan notes found for this encounter  Diagnoses and all orders for this visit:    Anxiety  -     busPIRone (BUSPAR) 15 mg tablet; Take 1 tablet (15 mg total) by mouth 3 (three) times a day    Other insomnia  -     traZODone (DESYREL) 50 mg tablet; Take 1 tablet (50 mg total) by mouth daily at bedtime          Subjective:      Patient ID: Noah Forrester is a 49 y o  female  HPI    Patient has had difficulty sleeping and also having anxiety at work due to recent work situation  Patient works at  [employer] in [place], [place]  She is the   Recently, up staff have been complaining and she was told that she had to change your leadership style  However, the president of the [facility] reduce her pay quite significantly  She relocated for this new job but is now unable to pay for her home with her current salary  She has been sleeping around 2 hours a day due to her anxiety  At work, she is unable to focus due to this drastic change  Complains of palpitations, and inability to concentrate  She has been seeing a therapist more regularly now due to the situation  Patient is a [employer]  Does go to [employer]  Started doing some meditation and yoga up a finds it difficult due to her current state  Denies any thoughts of hurting herself or others  The following portions of the patient's history were reviewed and updated as appropriate: allergies, current medications, past family history, past medical history, past social history, past surgical history and problem list     Review of Systems   Constitutional: Negative for chills and fever  HENT: Negative for ear pain  Eyes: Negative for visual disturbance  Respiratory: Negative for cough and wheezing  Cardiovascular: Positive for palpitations (situational)  Negative for chest pain  Gastrointestinal: Negative for diarrhea, nausea and vomiting  Musculoskeletal: Negative for back pain  Neurological: Negative for dizziness and headaches  Psychiatric/Behavioral: Positive for decreased concentration and sleep disturbance  Negative for self-injury and suicidal ideas  The patient is nervous/anxious  Objective:      /92 (BP Location: Left arm, Patient Position: Sitting, Cuff Size: Adult)   Pulse 68   Temp 98 2 °F (36 8 °C) (Tympanic)   Resp 16   Ht 5' (1 524 m)   Wt 84 5 kg (186 lb 3 2 oz)   BMI 36 36 kg/m²          Physical Exam  Vitals and nursing note reviewed  HENT:      Head: Normocephalic and atraumatic  Eyes:      Conjunctiva/sclera: Conjunctivae normal    Cardiovascular:      Rate and Rhythm: Normal rate and regular rhythm  Pulses: Normal pulses  Heart sounds: No murmur heard  Pulmonary:      Effort: Pulmonary effort is normal       Breath sounds: Normal breath sounds  No wheezing  Musculoskeletal:      Cervical back: Neck supple  Lymphadenopathy:      Cervical: No cervical adenopathy  Skin:     General: Skin is warm and dry  Neurological:      Mental Status: She is alert  Psychiatric:      Comments: Patient appears anxious and at times tearful

## 2021-08-16 NOTE — ASSESSMENT & PLAN NOTE
Patient has increased anxiety secondary to loss of income  Concurrently very stressed  Discussed with patient expanding support to include therapists,  at a Confucianist, and/or family  Patient will try these things  advised patient to continue seeing therapist     Elise Greenfield to try spending more time with meditation and yoga as a means to decrease her stress  Will start buspirone 15 mg twice a day for anxiety  Advised patient to complete blood work from her prior visit  Follow-up in 1 month

## 2021-08-16 NOTE — LETTER
August 16, 2021     Patient: Иван Lopez   YOB: 1970   Date of Visit: 8/16/2021       To Whom it May Concern:    Carmen Flores is under my professional care  She was seen in my office on 8/16/2021  She will need time off for medical reasons from 8/16/21 to 8/22/21  She may return to work on 8/23/21  If you have any questions or concerns, please don't hesitate to call           Sincerely,          Janet Maciel DO        CC: No Recipients

## 2021-08-16 NOTE — PATIENT INSTRUCTIONS
Please start trazodone 50 mg  At bedtime to help him sleep  Please start buspirone 15 mg twice a day for anxiety    Continue with counseling  Follow up in 1 month or sooner

## 2021-08-16 NOTE — ASSESSMENT & PLAN NOTE
Patient is only sleeping 2 hours at night secondary to her anxiety  Advised patient to try to decrease stress in her life by going to counseling, talking to other individuals, and doing yoga/ meditation  Also advised patient should to exercise as tolerated  Will start trazodone 50 mg at bedtime to help with sleep    Follow-up in 1 month

## 2021-09-07 ENCOUNTER — RA CDI HCC (OUTPATIENT)
Dept: OTHER | Facility: HOSPITAL | Age: 51
End: 2021-09-07

## 2021-09-07 NOTE — PROGRESS NOTES
[facility]       Chart reviewed, no opportunity found: CHART REVIEWED, NO OPPORTUNITY FOUND                        Patients insurance company:  [employer] [facility] (Medicare Advantage and Commercial)

## 2021-09-10 ENCOUNTER — TELEPHONE (OUTPATIENT)
Dept: GASTROENTEROLOGY | Facility: HOSPITAL | Age: 51
End: 2021-09-10

## 2021-09-12 ENCOUNTER — ANESTHESIA EVENT (OUTPATIENT)
Dept: ANESTHESIOLOGY | Facility: HOSPITAL | Age: 51
End: 2021-09-12

## 2021-09-12 ENCOUNTER — ANESTHESIA (OUTPATIENT)
Dept: ANESTHESIOLOGY | Facility: HOSPITAL | Age: 51
End: 2021-09-12

## 2021-09-12 NOTE — ANESTHESIA PREPROCEDURE EVALUATION
Procedure:  PRE-OP ONLY    Relevant Problems   HEMATOLOGY   (+) Iron deficiency anemia due to chronic blood loss      MUSCULOSKELETAL   (+) Chronic right-sided thoracic back pain   (+) Mid back pain   (+) Myofascial pain syndrome      NEURO/PSYCH   (+) Anxiety   (+) Chronic right-sided thoracic back pain   (+) Depression with anxiety   (+) Myofascial pain syndrome      EKG 2017:  Normal sinus rhythm  Normal ECG  No previous ECGs available    Lab Results   Component Value Date    WBC 7 6 07/20/2020    HGB 11 5 (L) 07/20/2020    HCT 35 7 07/20/2020    MCV 82 4 07/20/2020     07/20/2020     Lab Results   Component Value Date    SODIUM 138 07/20/2020    K 4 1 07/20/2020     07/20/2020    CO2 28 07/20/2020    BUN 10 07/20/2020    CREATININE 0 63 07/20/2020    GLUC 91 07/20/2020    CALCIUM 8 4 (L) 07/20/2020     No results found for: INR, PROTIME  No results found for: HGBA1C            Anesthesia Plan  ASA Score- 2     Anesthesia Type- IV sedation with anesthesia with ASA Monitors  Additional Monitors:   Airway Plan:           Plan Factors-    Chart reviewed  EKG reviewed  Existing labs reviewed  Patient summary reviewed  Induction- intravenous      Postoperative Plan-     Informed Consent-

## 2021-09-13 ENCOUNTER — ANESTHESIA (OUTPATIENT)
Dept: GASTROENTEROLOGY | Facility: HOSPITAL | Age: 51
End: 2021-09-13

## 2021-09-13 ENCOUNTER — HOSPITAL ENCOUNTER (OUTPATIENT)
Dept: GASTROENTEROLOGY | Facility: HOSPITAL | Age: 51
Setting detail: OUTPATIENT SURGERY
Discharge: HOME | End: 2021-09-13
Attending: COLON & RECTAL SURGERY | Admitting: COLON & RECTAL SURGERY
Payer: COMMERCIAL

## 2021-09-13 ENCOUNTER — ANESTHESIA EVENT (OUTPATIENT)
Dept: GASTROENTEROLOGY | Facility: HOSPITAL | Age: 51
End: 2021-09-13

## 2021-09-13 VITALS
BODY MASS INDEX: 36.52 KG/M2 | SYSTOLIC BLOOD PRESSURE: 106 MMHG | OXYGEN SATURATION: 97 % | RESPIRATION RATE: 16 BRPM | WEIGHT: 186 LBS | HEIGHT: 60 IN | HEART RATE: 87 BPM | DIASTOLIC BLOOD PRESSURE: 66 MMHG | TEMPERATURE: 97.2 F

## 2021-09-13 DIAGNOSIS — R10.9 ABDOMINAL PAIN, UNSPECIFIED ABDOMINAL LOCATION: ICD-10-CM

## 2021-09-13 DIAGNOSIS — Z12.11 COLON CANCER SCREENING: ICD-10-CM

## 2021-09-13 PROCEDURE — 88112 CYTOPATH CELL ENHANCE TECH: CPT | Performed by: PATHOLOGY

## 2021-09-13 PROCEDURE — 45380 COLONOSCOPY AND BIOPSY: CPT | Performed by: COLON & RECTAL SURGERY

## 2021-09-13 PROCEDURE — 43239 EGD BIOPSY SINGLE/MULTIPLE: CPT | Performed by: INTERNAL MEDICINE

## 2021-09-13 PROCEDURE — 88305 TISSUE EXAM BY PATHOLOGIST: CPT | Performed by: PATHOLOGY

## 2021-09-13 RX ORDER — PROPOFOL 10 MG/ML
INJECTION, EMULSION INTRAVENOUS CONTINUOUS PRN
Status: DISCONTINUED | OUTPATIENT
Start: 2021-09-13 | End: 2021-09-13

## 2021-09-13 RX ORDER — SODIUM CHLORIDE 9 MG/ML
INJECTION, SOLUTION INTRAVENOUS CONTINUOUS PRN
Status: DISCONTINUED | OUTPATIENT
Start: 2021-09-13 | End: 2021-09-13

## 2021-09-13 RX ORDER — PROPOFOL 10 MG/ML
INJECTION, EMULSION INTRAVENOUS AS NEEDED
Status: DISCONTINUED | OUTPATIENT
Start: 2021-09-13 | End: 2021-09-13

## 2021-09-13 RX ADMIN — PROPOFOL 100 MG: 10 INJECTION, EMULSION INTRAVENOUS at 08:50

## 2021-09-13 RX ADMIN — PROPOFOL 130 MCG/KG/MIN: 10 INJECTION, EMULSION INTRAVENOUS at 08:50

## 2021-09-13 RX ADMIN — SODIUM CHLORIDE: 0.9 INJECTION, SOLUTION INTRAVENOUS at 08:47

## 2021-09-13 NOTE — ANESTHESIA PREPROCEDURE EVALUATION
Procedure:  COLONOSCOPY  EGD    Relevant Problems   ANESTHESIA (within normal limits)      CARDIO (within normal limits)      GI/HEPATIC (within normal limits)      /RENAL (within normal limits)      HEMATOLOGY   (+) Iron deficiency anemia due to chronic blood loss      MUSCULOSKELETAL   (+) Chronic right-sided thoracic back pain   (+) Mid back pain   (+) Myofascial pain syndrome      NEURO/PSYCH   (+) Anxiety   (+) Chronic right-sided thoracic back pain   (+) Depression with anxiety   (+) Myofascial pain syndrome      Other   (+) Arthralgia of multiple sites   (+) Cervical radiculopathy   (+) Severe obesity (BMI 35 0-35 9 with comorbidity) ([facility] [address] )      EKG 2017:  Normal sinus rhythm  Normal ECG  No previous ECGs available    Lab Results   Component Value Date    WBC 7 6 07/20/2020    HGB 11 5 (L) 07/20/2020    HCT 35 7 07/20/2020    MCV 82 4 07/20/2020     07/20/2020     Lab Results   Component Value Date    SODIUM 138 07/20/2020    K 4 1 07/20/2020     07/20/2020    CO2 28 07/20/2020    BUN 10 07/20/2020    CREATININE 0 63 07/20/2020    GLUC 91 07/20/2020    CALCIUM 8 4 (L) 07/20/2020     No results found for: INR, PROTIME  No results found for: HGBA1C       Physical Exam    Airway    Mallampati score: II  TM Distance: >3 FB  Neck ROM: full     Dental   No notable dental hx     Cardiovascular  Cardiovascular exam normal    Pulmonary  Pulmonary exam normal     Other Findings        Anesthesia Plan  ASA Score- 2     Anesthesia Type- IV sedation with anesthesia with ASA Monitors  Additional Monitors:   Airway Plan:           Plan Factors-Exercise tolerance (METS): >4 METS  Chart reviewed  EKG reviewed  Existing labs reviewed  Patient summary reviewed  Induction- intravenous  Postoperative Plan-     Informed Consent- Anesthetic plan and risks discussed with patient  I personally reviewed this patient with the CRNA   Discussed and agreed on the Anesthesia Plan with the CRNA Rosella Goldmann

## 2021-09-13 NOTE — ANESTHESIA POSTPROCEDURE EVALUATION
Post-Op Assessment Note    CV Status:  Stable  Pain Score: 0    Pain management: adequate     Mental Status:  Sleepy   Hydration Status:  Euvolemic   PONV Controlled:  Controlled   Airway Patency:  Patent      Post Op Vitals Reviewed: Yes      Staff: CRNA         No complications documented      BP   114/75   Temp      Pulse  85   Resp   18   SpO2   99

## 2021-09-13 NOTE — H&P
History and Physical   Colon and Rectal Surgery   Lenny Rasheed 49 y o  female MRN: [id number]  Unit/Bed#:  Encounter: [id number]  21   @NOW    No chief complaint on file  History of Present Illness   HPI:  Lenny Rasheed is a 49 y o  female who presents for evaluation  Of epigastric pain from constipation  No prior exams        Historical Information   Past Medical History:   Diagnosis Date    Allergic rhinitis     Anxiety     Arthralgia of multiple sites     Back pain     RIGHT LOW BACK PAIN   RESOLVED  [id number]  UPPER BACK PAIN RESOLVED [id number]    Breast nodule     Bronchitis     Cellulitis and abscess of leg     RESOLVED 2015    Chronic pain disorder     Common migraine without aura     Depression     Eczema     Fatigue     Forceps delivery      4 para 4     H/O molar pregnancy, antepartum     Herpes zoster     [id number] RESOLVED    Iron deficiency anemia     Irregular menses     Myalgia     Obesity     Pain in left shoulder     Prior pregnancy with fetal demise     Right shoulder pain     Sebaceous cyst     Sinusitis     Tinea corporis     Trichomoniasis     Vaginitis     Vitamin D deficiency     Yeast UTI      Past Surgical History:   Procedure Laterality Date    ADENOIDECTOMY      APPENDECTOMY      DILATION AND CURETTAGE OF UTERUS      LAPAROSCOPY      OOPHORECTOMY Right     age 28    OVARY SURGERY      TONSILLECTOMY      OVER AGE 16    TUBAL LIGATION      VENTRAL HERNIA REPAIR         Meds/Allergies     (Not in a hospital admission)        Current Outpatient Medications:     busPIRone (BUSPAR) 15 mg tablet, Take 1 tablet (15 mg total) by mouth 2 (two) times a day, Disp: 60 tablet, Rfl: 1    cetirizine (ZYRTEC ALLERGY) 10 mg tablet, Take 10 mg by mouth as needed  , Disp: , Rfl:     multivitamin (THERAGRAN) TABS, Take 1 tablet by mouth daily, Disp: , Rfl:     traZODone (DESYREL) 50 mg tablet, Take 1 tablet (50 mg total) by mouth daily at bedtime, Disp: 30 tablet, Rfl: 1    Allergies   Allergen Reactions    Red Dye - Food Allergy Anaphylaxis and Edema     States occurred after tattoo and had swelling at site  Denies anaphylaxis    Clindamycin Diarrhea    Latex Rash    Mold Extract [Trichophyton] Rash    Penicillins Rash         Social History   Social History     Substance and Sexual Activity   Alcohol Use No     Social History     Substance and Sexual Activity   Drug Use No     Social History     Tobacco Use   Smoking Status Never Smoker   Smokeless Tobacco Never Used         Family History:   Family History   Problem Relation Age of Onset    COPD Father    Susie Birch Lung cancer Father 76    Diabetes Maternal Grandmother     Diabetes Maternal Aunt     No Known Problems Mother     No Known Problems Daughter     No Known Problems Maternal Grandfather     No Known Problems Half-Sister     No Known Problems Half-Sister     No Known Problems Maternal Aunt     No Known Problems Maternal Aunt     No Known Problems Maternal Aunt     No Known Problems Maternal Aunt     No Known Problems Paternal Aunt     Hypertension Neg Hx     Heart disease Neg Hx     Stroke Neg Hx     Thyroid disease Neg Hx          Objective     Current Vitals:   Blood Pressure: 123/76 (09/13/21 0749)  Pulse: 73 (09/13/21 0749)  Temperature: 98 3 °F (36 8 °C) (09/13/21 0749)  Temp Source: Tympanic (09/13/21 0749)  Respirations: 16 (09/13/21 0749)  Height: 5' (152 4 cm) (09/13/21 0749)  Weight - Scale: 84 4 kg (186 lb) (09/13/21 0749)  SpO2: 98 % (09/13/21 0749)  No intake or output data in the 24 hours ending 09/13/21 0758    Physical Exam:  General:  Resting comfortably in bed   Eyes:Sclera anicteric  ENT: Trachea midline  Pulm:  Symmetric chest raise  No respiratory Distress  CV:  Regular on monitor  Abdomen:  Soft NT ND  Extremities:  No clubbing/ cyanosis/ edema    Lab Results: I have personally reviewed pertinent lab results      Imaging: I have personally reviewed pertinent reports  ASSESSMENT:  Whit Mcginnis is a 49 y o  female who presents for outpatient colonoscopy  PLAN:  For colonoscopy    Risks/ Benefits reviewed to include but not limited to anesthesia, bleeding, missed lesions, and colonoscopic perforation requiring surgery

## 2021-09-15 ENCOUNTER — TELEPHONE (OUTPATIENT)
Dept: GASTROENTEROLOGY | Facility: CLINIC | Age: 51
End: 2021-09-15

## 2021-09-15 NOTE — TELEPHONE ENCOUNTER
----- Message from Parker Moffett PA-C sent at 9/15/2021 12:11 PM EDT -----  Please let pt know she has H pylori  I sentr in quadruple therapy for her as she has allergy to penicillins  However, it is noted in her chart that she has allergy to red dye and so omeprazole and tetracycline were both flagged for this  It is documented next to her allergy that she does NOT get anaphylaxis from this and she only got swelling at the site of red ink from a tattoo  Please ensure this is the case  If pt has severe allergic reactions to side effects, then she may not be able to tolerate PPI or tetracycline  H pylori stool study was ordered, as well  Thanks!  ----- Message -----  From: Mine Dorado DO  Sent: 9/15/2021  12:01 PM EDT  To: , #    Will someone please call her and start h pylori treatment        Thank you,    Jus Forman

## 2021-09-15 NOTE — TELEPHONE ENCOUNTER
Called pt and inquired regarding red dye allergy-pt explained it is just a skin irritation as she consumes many food items with red dye in them with no issue  I went over Hpylori results as well as treatment course  Reviewed medications and future stool test  Pt had no concerns or questions   She was instructed to contact us with any issues or questions

## 2021-09-28 LAB
BASOPHILS # BLD AUTO: 32 CELLS/UL (ref 0–200)
BASOPHILS NFR BLD AUTO: 0.6 %
EOSINOPHIL # BLD AUTO: 38 CELLS/UL (ref 15–500)
EOSINOPHIL NFR BLD AUTO: 0.7 %
ERYTHROCYTE [DISTWIDTH] IN BLOOD BY AUTOMATED COUNT: 13.9 % (ref 11–15)
HCT VFR BLD AUTO: 37.4 % (ref 35–45)
HGB BLD-MCNC: 12.3 G/DL (ref 11.7–15.5)
IRON SATN MFR SERPL: 14 % (CALC) (ref 16–45)
IRON SERPL-MCNC: 42 MCG/DL (ref 45–160)
LYMPHOCYTES # BLD AUTO: 1809 CELLS/UL (ref 850–3900)
LYMPHOCYTES NFR BLD AUTO: 33.5 %
MCH RBC QN AUTO: 27.1 PG (ref 27–33)
MCHC RBC AUTO-ENTMCNC: 32.9 G/DL (ref 32–36)
MCV RBC AUTO: 82.4 FL (ref 80–100)
MONOCYTES # BLD AUTO: 605 CELLS/UL (ref 200–950)
MONOCYTES NFR BLD AUTO: 11.2 %
NEUTROPHILS # BLD AUTO: 2916 CELLS/UL (ref 1500–7800)
NEUTROPHILS NFR BLD AUTO: 54 %
PLATELET # BLD AUTO: 271 THOUSAND/UL (ref 140–400)
PMV BLD REES-ECKER: 10.9 FL (ref 7.5–12.5)
RBC # BLD AUTO: 4.54 MILLION/UL (ref 3.8–5.1)
TIBC SERPL-MCNC: 290 MCG/DL (CALC) (ref 250–450)
TSH SERPL-ACNC: 1.24 MIU/L
WBC # BLD AUTO: 5.4 THOUSAND/UL (ref 3.8–10.8)

## 2021-11-03 ENCOUNTER — TELEPHONE (OUTPATIENT)
Dept: GASTROENTEROLOGY | Facility: CLINIC | Age: 51
End: 2021-11-03

## 2021-11-11 ENCOUNTER — APPOINTMENT (OUTPATIENT)
Dept: LAB | Facility: HOSPITAL | Age: 51
End: 2021-11-11
Payer: COMMERCIAL

## 2021-11-11 DIAGNOSIS — A04.8 H. PYLORI INFECTION: ICD-10-CM

## 2021-11-11 PROCEDURE — 87338 HPYLORI STOOL AG IA: CPT

## 2021-11-12 LAB — H PYLORI AG STL QL IA: NEGATIVE

## 2021-11-15 ENCOUNTER — TELEPHONE (OUTPATIENT)
Dept: FAMILY MEDICINE CLINIC | Facility: CLINIC | Age: 51
End: 2021-11-15

## 2021-11-15 DIAGNOSIS — Z12.31 ENCOUNTER FOR SCREENING MAMMOGRAM FOR MALIGNANT NEOPLASM OF BREAST: Primary | ICD-10-CM

## 2021-12-06 ENCOUNTER — TELEPHONE (OUTPATIENT)
Dept: OBGYN CLINIC | Facility: CLINIC | Age: 51
End: 2021-12-06

## 2021-12-06 PROBLEM — Z00.00 WELL ADULT EXAM: Status: RESOLVED | Noted: 2018-01-30 | Resolved: 2021-12-06

## 2021-12-06 PROBLEM — Z12.4 CERVICAL CANCER SCREENING: Status: RESOLVED | Noted: 2018-01-30 | Resolved: 2021-12-06

## 2021-12-06 PROBLEM — Z01.419 ENCOUNTER FOR GYNECOLOGICAL EXAMINATION (GENERAL) (ROUTINE) WITHOUT ABNORMAL FINDINGS: Status: RESOLVED | Noted: 2020-07-20 | Resolved: 2021-12-06

## 2022-01-25 ENCOUNTER — HOSPITAL ENCOUNTER (OUTPATIENT)
Dept: MAMMOGRAPHY | Facility: IMAGING CENTER | Age: 52
Discharge: HOME | End: 2022-01-25
Payer: COMMERCIAL

## 2022-01-25 VITALS — WEIGHT: 178.57 LBS | BODY MASS INDEX: 33.71 KG/M2 | HEIGHT: 61 IN

## 2022-01-25 DIAGNOSIS — Z12.31 ENCOUNTER FOR SCREENING MAMMOGRAM FOR MALIGNANT NEOPLASM OF BREAST: ICD-10-CM

## 2022-01-25 DIAGNOSIS — Z12.31 VISIT FOR SCREENING MAMMOGRAM: ICD-10-CM

## 2022-01-25 PROCEDURE — 77067 SCR MAMMO BI INCL CAD: CPT

## 2022-01-25 PROCEDURE — 77063 BREAST TOMOSYNTHESIS BI: CPT

## 2022-04-14 ENCOUNTER — HOSPITAL ENCOUNTER (OUTPATIENT)
Facility: CLINIC | Age: 52
Discharge: HOME | End: 2022-04-14
Attending: INTERNAL MEDICINE
Payer: COMMERCIAL

## 2022-04-14 VITALS
SYSTOLIC BLOOD PRESSURE: 137 MMHG | TEMPERATURE: 101.2 F | HEART RATE: 81 BPM | DIASTOLIC BLOOD PRESSURE: 79 MMHG | RESPIRATION RATE: 16 BRPM | OXYGEN SATURATION: 96 %

## 2022-04-14 DIAGNOSIS — R68.89 FLU-LIKE SYMPTOMS: Primary | ICD-10-CM

## 2022-04-14 LAB
EXPIRATION DATE: NORMAL
Lab: NORMAL
POCT MANUFACTURER: NORMAL
RAPID INFLUENZA A AGN: NEGATIVE
RAPID INFLUENZA B AGN: NEGATIVE

## 2022-04-14 PROCEDURE — 99213 OFFICE O/P EST LOW 20 MIN: CPT | Performed by: INTERNAL MEDICINE

## 2022-04-14 PROCEDURE — 87804 INFLUENZA ASSAY W/OPTIC: CPT | Performed by: INTERNAL MEDICINE

## 2022-04-14 PROCEDURE — S9083 URGENT CARE CENTER GLOBAL: HCPCS | Performed by: INTERNAL MEDICINE

## 2022-04-14 PROCEDURE — U0003 INFECTIOUS AGENT DETECTION BY NUCLEIC ACID (DNA OR RNA); SEVERE ACUTE RESPIRATORY SYNDROME CORONAVIRUS 2 (SARS-COV-2) (CORONAVIRUS DISEASE [COVID-19]), AMPLIFIED PROBE TECHNIQUE, MAKING USE OF HIGH THROUGHPUT TECHNOLOGIES AS DESCRIBED BY CMS-2020-01-R: HCPCS | Performed by: INTERNAL MEDICINE

## 2022-04-14 RX ORDER — IBUPROFEN 200 MG
400 TABLET ORAL ONCE
Status: COMPLETED | OUTPATIENT
Start: 2022-04-14 | End: 2022-04-14

## 2022-04-14 RX ADMIN — Medication 400 MG: at 13:09

## 2022-04-14 ASSESSMENT — ENCOUNTER SYMPTOMS
FEVER: 1
COUGH: 0
DYSURIA: 0
RHINORRHEA: 0
DECREASED APPETITE: 1
HEADACHES: 1
VOMITING: 0
MYALGIAS: 1
NAUSEA: 1
HEMATURIA: 0
FLU SYMPTOMS: 1
SHORTNESS OF BREATH: 0
FATIGUE: 1
CHILLS: 1
SORE THROAT: 0
ARTHRALGIAS: 1
DIARRHEA: 0

## 2022-04-14 NOTE — Clinical Note
Christa Graham was seen and treated in our urgent care on 4/14/2022.  She may return to work on 04/20/2022.  Please excuse from work due to illness.  Thank you for your consideration in this matter.  Please call with any questions or concerns thank you     If you have any questions or concerns, please don't hesitate to call.      Temo Kemp, DO

## 2022-04-14 NOTE — DISCHARGE INSTRUCTIONS
I do think that you have a viral syndrome and flulike illness despite a negative flu test in the office.  The negative predictive value of our test is on the lower side.  Supportive treatment is recommended at this time.  In the office you were given Motrin 400 mg and Tylenol 650 mg.  Your temperature did come down a couple of degrees.  I want you to continue the Motrin every 6 hours as needed and the Tylenol staggering every 4 hours in between the doses of Motrin.    I want you to stay out of work until the middle of next week.  Please see the provided work note.  You have to push fluids.  Sips of fluids every 20 minutes while awake.  Try and think outside the box.  You can use water.  Ice chips.  Gatorade, Pedialyte, popsicles, water ice, juice or whatever you find palatable.  Supplemental vitamin C and vitamin D is also recommended.    We are sending a COVID-19 swab.  Please quarantine until results become available.  You may find the results and likely will find them sooner on the [employer] application.  Please follow-up with your primary care provider next week with any ongoing symptoms or concerns.

## 2022-04-14 NOTE — ED PROVIDER NOTES
History  Chief Complaint   Patient presents with   • Headache     Body aches, joint pain     + flu vaccine  Covid vaccine x 2 P    Conference Saturday, under the weather later that day      Headache  Associated symptoms: ear pain, fatigue, fever, myalgias and nausea    Associated symptoms: no congestion, no cough, no diarrhea, no sore throat and no vomiting    Flu Symptoms  Presenting symptoms: fatigue, fever, headache, myalgias and nausea    Presenting symptoms: no cough, no diarrhea, no rhinorrhea, no shortness of breath, no sore throat and no vomiting    Presenting symptoms comment:  Body aches, joint pains  Onset quality:  Sudden  Duration:  5 days  Chronicity:  New  Relieved by:  OTC medications (motrin 8AM)  Ineffective treatments: dayquil, nyquil.  Associated symptoms: chills, decreased appetite and ear pain    Associated symptoms: no congestion    Risk factors: no diabetes problem, no heart disease, no kidney disease, no liver disease and no sick contacts        No past medical history on file.    No past surgical history on file.    No family history on file.         Review of Systems   Constitutional: Positive for chills, decreased appetite, fatigue and fever.   HENT: Positive for ear pain. Negative for congestion, rhinorrhea and sore throat.    Respiratory: Negative for cough and shortness of breath.    Cardiovascular: Negative for leg swelling.   Gastrointestinal: Positive for nausea. Negative for diarrhea and vomiting.   Genitourinary: Negative for dysuria and hematuria.   Musculoskeletal: Positive for arthralgias and myalgias.   Skin: Negative for rash.   Neurological: Positive for headaches.       Physical Exam  ED Triage Vitals [04/14/22 1258]   Temp Heart Rate Resp BP SpO2   (!) 39.6 °C (103.2 °F) 81 16 137/79 96 %      Temp Source Heart Rate Source Patient Position BP Location FiO2 (%) (Set)   Oral Monitor -- -- --       Physical Exam  Vitals reviewed.   Constitutional:       Appearance: She is not  ill-appearing, toxic-appearing or diaphoretic.      Comments: A little washed out   HENT:      Right Ear: Tympanic membrane, ear canal and external ear normal. There is no impacted cerumen.      Left Ear: Tympanic membrane, ear canal and external ear normal. There is no impacted cerumen.   Eyes:      General:         Right eye: No discharge.         Left eye: No discharge.   Cardiovascular:      Rate and Rhythm: Normal rate and regular rhythm.      Heart sounds: No murmur heard.    No friction rub.   Pulmonary:      Effort: Pulmonary effort is normal. No respiratory distress.      Breath sounds: No wheezing, rhonchi or rales.   Abdominal:      General: There is no distension.      Tenderness: There is no abdominal tenderness. There is no guarding.   Musculoskeletal:      Cervical back: Neck supple. No tenderness.   Lymphadenopathy:      Cervical: No cervical adenopathy.   Skin:     General: Skin is warm and dry.      Findings: No rash.   Neurological:      Mental Status: She is alert.   Psychiatric:         Mood and Affect: Mood normal.         Behavior: Behavior normal.           Procedures  Procedures    UC Course  Clinical Impressions as of 04/14/22 1414   Flu-like symptoms       MDM  Number of Diagnoses or Management Options  Flu-like symptoms  Diagnosis management comments: Rapid flu swab negative x2.  Was treated with Motrin and Tylenol.  Temp is coming down.  Suspect flulike illness or viral syndrome.  Aggressive hydration and antipyretics.  Work note given.  Follow-up with primary care provider next week with any ongoing symptoms or concerns.       Amount and/or Complexity of Data Reviewed  Clinical lab tests: ordered and reviewed                   Temo Kemp DO  04/14/22 2191

## 2022-04-15 LAB — SARS-COV-2 RNA RESP QL NAA+PROBE: NEGATIVE

## 2022-04-16 ENCOUNTER — HOSPITAL ENCOUNTER (OUTPATIENT)
Facility: CLINIC | Age: 52
Discharge: HOME | End: 2022-04-16
Attending: EMERGENCY MEDICINE
Payer: COMMERCIAL

## 2022-04-16 VITALS
WEIGHT: 170 LBS | SYSTOLIC BLOOD PRESSURE: 120 MMHG | HEART RATE: 80 BPM | RESPIRATION RATE: 16 BRPM | HEIGHT: 60 IN | TEMPERATURE: 96.9 F | BODY MASS INDEX: 33.38 KG/M2 | DIASTOLIC BLOOD PRESSURE: 60 MMHG

## 2022-04-16 DIAGNOSIS — M54.6 ACUTE BILATERAL THORACIC BACK PAIN: Primary | ICD-10-CM

## 2022-04-16 PROCEDURE — S9083 URGENT CARE CENTER GLOBAL: HCPCS | Performed by: NURSE PRACTITIONER

## 2022-04-16 PROCEDURE — 99213 OFFICE O/P EST LOW 20 MIN: CPT | Performed by: NURSE PRACTITIONER

## 2022-04-16 RX ORDER — TIZANIDINE HYDROCHLORIDE 6 MG/1
6 CAPSULE, GELATIN COATED ORAL 3 TIMES DAILY PRN
Qty: 30 CAPSULE | Refills: 0 | Status: SHIPPED | OUTPATIENT
Start: 2022-04-16 | End: 2022-04-26

## 2022-04-16 ASSESSMENT — ENCOUNTER SYMPTOMS
BACK PAIN: 1
MYALGIAS: 1

## 2022-04-16 NOTE — ED PROVIDER NOTES
"Emergency Medicine Note  HPI   HISTORY OF PRESENT ILLNESS     2 days back pain  \" spasms. My whole spine hurts\"  Pain increases with movement.   ACET & NSAIDs used without relief.   Denies hx of back injury or surgery  Denies loss of sensation to pelvis, incontinence  Seen a few days ago and dx with viral illness    Pt sees a chiropractor routinely. However not open on weekends    Pt denies any and all other new, worsening or associated signs sx or complaints.                    Patient History   PAST HISTORY     Reviewed from Nursing Triage:  Tobacco  Allergies  Meds  Problems  Med Hx  Surg Hx  Fam Hx  Soc   Hx        History reviewed. No pertinent past medical history.    History reviewed. No pertinent surgical history.    History reviewed. No pertinent family history.           Review of Systems   REVIEW OF SYSTEMS     Review of Systems   Musculoskeletal: Positive for back pain and myalgias.   All other systems reviewed and are negative.        VITALS     ED Vitals    Date/Time Temp Pulse Resp BP SpO2 [facility]   04/16/22 0959 36.1 °C (96.9 °F) 80 16 120/60 -- [facility]                       Physical Exam   PHYSICAL EXAM     Physical Exam  Vitals reviewed.   Constitutional:       General: She is not in acute distress.     Appearance: Normal appearance. She is well-developed. She is not ill-appearing or toxic-appearing.   HENT:      Head: Normocephalic.      Mouth/Throat:      Mouth: Mucous membranes are moist.      Pharynx: Oropharynx is clear.   Cardiovascular:      Rate and Rhythm: Normal rate and regular rhythm.      Heart sounds: Normal heart sounds.   Pulmonary:      Effort: Pulmonary effort is normal. No tachypnea, accessory muscle usage or respiratory distress.      Breath sounds: Normal breath sounds. No stridor. No decreased breath sounds, wheezing, rhonchi or rales.   Chest:      Chest wall: No tenderness, crepitus or edema.   Abdominal:      Palpations: Abdomen is soft.   Musculoskeletal:         General: " Tenderness present. No swelling, deformity or signs of injury.      Cervical back: Normal range of motion.      Comments: Thoracic paraspinous tenderness  Greatest to the R side.   No midline TTP  No obvious deformities     Skin:     General: Skin is warm and dry.      Capillary Refill: Capillary refill takes less than 2 seconds.      Findings: No rash.   Neurological:      Mental Status: She is alert and oriented to person, place, and time.   Psychiatric:         Mood and Affect: Mood normal.           PROCEDURES     Procedures     DATA     Results     None              No orders to display       Scoring tools                                 ED Course & MDM   MDM / ED COURSE / CLINICAL IMPRESSIONS / DISPO     MDM  Number of Diagnoses or Management Options  Acute bilateral thoracic back pain  Diagnosis management comments: Suspect muscle spasms , body aches    Rx: Zanaflex 6mg TID    Educated pt on:  Muscle Relaxants may cause drowsiness. Please take with caution and DO NOT take if driving or operating heavy machinery. May cause sleepiness, take before going to bed.   ___    Increase fluid intake:   Water, teas, sport drinks.   NO SUGARY drinks, NO CAFFIENE  ____    Pt given strict instructions to seek emergent medical tx if sx worsen and displayed verbal understanding of d/c instructions and all questions were answered at time of d/c from .       Patient Progress  Patient progress: stable      Clinical Impressions as of 04/16/22 1236   Acute bilateral thoracic back pain     Discharge         Belkys Lubin, LARYP  04/16/22 1236

## 2022-04-16 NOTE — DISCHARGE INSTRUCTIONS
Use attached paperwork for stretches and home care.     Over the counter: lidocaine patches, Salponas patches, Icy Hot cream or patch etc...  can help with pain management as well. Tylenol, Motrin/Ibuprofen/Naproxen can be used as well. Please follow directs as prescribed.    And always, R.I.C.E - Rest Ice Compress and Elevate effected area as best you can.

## 2022-04-16 NOTE — ED ATTESTATION NOTE
I was present in the office and provided direct supervison.     Julio Waterman MD  04/16/22 1500     (E4) spontaneous

## 2022-04-17 ENCOUNTER — APPOINTMENT (EMERGENCY)
Dept: RADIOLOGY | Facility: HOSPITAL | Age: 52
DRG: 872 | End: 2022-04-17
Attending: EMERGENCY MEDICINE
Payer: COMMERCIAL

## 2022-04-17 ENCOUNTER — HOSPITAL ENCOUNTER (INPATIENT)
Facility: HOSPITAL | Age: 52
LOS: 5 days | Discharge: HOME | DRG: 872 | End: 2022-04-22
Attending: EMERGENCY MEDICINE | Admitting: HOSPITALIST
Payer: COMMERCIAL

## 2022-04-17 DIAGNOSIS — N28.89 URETERITIS: ICD-10-CM

## 2022-04-17 DIAGNOSIS — A41.9 SEPSIS, DUE TO UNSPECIFIED ORGANISM, UNSPECIFIED WHETHER ACUTE ORGAN DYSFUNCTION PRESENT (CMS/HCC): Primary | ICD-10-CM

## 2022-04-17 PROBLEM — I95.9 LOW BLOOD PRESSURE: Status: ACTIVE | Noted: 2022-04-17

## 2022-04-17 PROBLEM — E86.0 DEHYDRATION: Status: ACTIVE | Noted: 2022-04-17

## 2022-04-17 LAB
ALBUMIN SERPL-MCNC: 3 G/DL (ref 3.4–5)
ALP SERPL-CCNC: 93 IU/L (ref 35–126)
ALT SERPL-CCNC: 28 IU/L (ref 11–54)
ANION GAP SERPL CALC-SCNC: 9 MEQ/L (ref 3–15)
AST SERPL-CCNC: 24 IU/L (ref 15–41)
ATRIAL RATE: 103
BACTERIA URNS QL MICRO: ABNORMAL /HPF
BASOPHILS # BLD: 0.05 K/UL (ref 0.01–0.1)
BASOPHILS NFR BLD: 0.2 %
BILIRUB SERPL-MCNC: 1.3 MG/DL (ref 0.3–1.2)
BILIRUB UR QL STRIP.AUTO: NEGATIVE MG/DL
BUN SERPL-MCNC: 21 MG/DL (ref 8–20)
CALCIUM SERPL-MCNC: 8.4 MG/DL (ref 8.9–10.3)
CHLORIDE SERPL-SCNC: 94 MEQ/L (ref 98–109)
CLARITY UR REFRACT.AUTO: ABNORMAL
CO2 SERPL-SCNC: 25 MEQ/L (ref 22–32)
COLOR UR AUTO: YELLOW
CREAT SERPL-MCNC: 1 MG/DL (ref 0.6–1.1)
DIFFERENTIAL METHOD BLD: ABNORMAL
EOSINOPHIL # BLD: 0.02 K/UL (ref 0.04–0.36)
EOSINOPHIL NFR BLD: 0.1 %
ERYTHROCYTE [DISTWIDTH] IN BLOOD BY AUTOMATED COUNT: 15.1 % (ref 11.7–14.4)
FLUAV RNA SPEC QL NAA+PROBE: NEGATIVE
FLUBV RNA SPEC QL NAA+PROBE: NEGATIVE
GFR SERPL CREATININE-BSD FRML MDRD: 58.5 ML/MIN/1.73M*2
GLUCOSE BLD-MCNC: 119 MG/DL (ref 70–99)
GLUCOSE SERPL-MCNC: 116 MG/DL (ref 70–99)
GLUCOSE UR STRIP.AUTO-MCNC: NEGATIVE MG/DL
HCT VFR BLDCO AUTO: 32.7 % (ref 35–45)
HGB BLD-MCNC: 11 G/DL (ref 11.8–15.7)
HGB UR QL STRIP.AUTO: 3
HYALINE CASTS #/AREA URNS LPF: ABNORMAL /LPF
IMM GRANULOCYTES # BLD AUTO: 0.46 K/UL (ref 0–0.08)
IMM GRANULOCYTES NFR BLD AUTO: 2.2 %
KETONES UR STRIP.AUTO-MCNC: NEGATIVE MG/DL
LACTATE SERPL-SCNC: 1 MMOL/L (ref 0.4–2)
LACTATE SERPL-SCNC: 1.7 MMOL/L (ref 0.4–2)
LEUKOCYTE ESTERASE UR QL STRIP.AUTO: 3
LYMPHOCYTES # BLD: 0.89 K/UL (ref 1.2–3.5)
LYMPHOCYTES NFR BLD: 4.2 %
MAGNESIUM SERPL-MCNC: 1.8 MG/DL (ref 1.8–2.5)
MCH RBC QN AUTO: 26.9 PG (ref 28–33.2)
MCHC RBC AUTO-ENTMCNC: 33.6 G/DL (ref 32.2–35.5)
MCV RBC AUTO: 80 FL (ref 83–98)
MONOCYTES # BLD: 1.24 K/UL (ref 0.28–0.8)
MONOCYTES NFR BLD: 5.9 %
MUCOUS THREADS URNS QL MICRO: ABNORMAL /LPF
NEUTROPHILS # BLD: 18.43 K/UL (ref 1.7–7)
NEUTS SEG NFR BLD: 87.4 %
NITRITE UR QL STRIP.AUTO: NEGATIVE
NRBC BLD-RTO: 0 %
P AXIS: 18
PDW BLD AUTO: 9.8 FL (ref 9.4–12.3)
PH UR STRIP.AUTO: 6 [PH]
PLATELET # BLD AUTO: 220 K/UL (ref 150–369)
POCT TEST: ABNORMAL
POTASSIUM SERPL-SCNC: 3.8 MEQ/L (ref 3.6–5.1)
PR INTERVAL: 148
PROT SERPL-MCNC: 6.3 G/DL (ref 6–8.2)
PROT UR QL STRIP.AUTO: 1
QRS DURATION: 74
QT INTERVAL: 320
QTC CALCULATION(BAZETT): 419
R AXIS: 21
RBC # BLD AUTO: 4.09 M/UL (ref 3.93–5.22)
RBC #/AREA URNS HPF: ABNORMAL /HPF
RSV RNA SPEC QL NAA+PROBE: NEGATIVE
SARS-COV-2 RNA RESP QL NAA+PROBE: NEGATIVE
SODIUM SERPL-SCNC: 128 MEQ/L (ref 136–144)
SP GR UR REFRACT.AUTO: 1.02
SQUAMOUS URNS QL MICRO: ABNORMAL /HPF
T WAVE AXIS: 11
TROPONIN I SERPL HS-MCNC: 3.9 PG/ML
TROPONIN I SERPL HS-MCNC: 5.4 PG/ML
UROBILINOGEN UR STRIP-ACNC: 0.2 EU/DL
VENTRICULAR RATE: 103
WBC # BLD AUTO: 21.09 K/UL (ref 3.8–10.5)
WBC #/AREA URNS HPF: ABNORMAL /HPF
YEAST #/AREA URNS HPF: 1 /HPF

## 2022-04-17 PROCEDURE — 25000000 HC PHARMACY GENERAL: Performed by: EMERGENCY MEDICINE

## 2022-04-17 PROCEDURE — 63600000 HC DRUGS/DETAIL CODE: Performed by: EMERGENCY MEDICINE

## 2022-04-17 PROCEDURE — 25800000 HC PHARMACY IV SOLUTIONS: Performed by: EMERGENCY MEDICINE

## 2022-04-17 PROCEDURE — 87086 URINE CULTURE/COLONY COUNT: CPT | Performed by: EMERGENCY MEDICINE

## 2022-04-17 PROCEDURE — 81001 URINALYSIS AUTO W/SCOPE: CPT | Performed by: EMERGENCY MEDICINE

## 2022-04-17 PROCEDURE — 63700000 HC SELF-ADMINISTRABLE DRUG

## 2022-04-17 PROCEDURE — 96375 TX/PRO/DX INJ NEW DRUG ADDON: CPT | Mod: 59

## 2022-04-17 PROCEDURE — 25000000 HC PHARMACY GENERAL: Performed by: HOSPITALIST

## 2022-04-17 PROCEDURE — 63600000 HC DRUGS/DETAIL CODE: Performed by: HOSPITALIST

## 2022-04-17 PROCEDURE — 20600000 HC ROOM AND CARE INTERMEDIATE/TELEMETRY

## 2022-04-17 PROCEDURE — 63600000 HC DRUGS/DETAIL CODE: Performed by: INTERNAL MEDICINE

## 2022-04-17 PROCEDURE — 71045 X-RAY EXAM CHEST 1 VIEW: CPT

## 2022-04-17 PROCEDURE — 87637 SARSCOV2&INF A&B&RSV AMP PRB: CPT | Performed by: EMERGENCY MEDICINE

## 2022-04-17 PROCEDURE — 63700000 HC SELF-ADMINISTRABLE DRUG: Performed by: EMERGENCY MEDICINE

## 2022-04-17 PROCEDURE — 99291 CRITICAL CARE FIRST HOUR: CPT | Performed by: HOSPITALIST

## 2022-04-17 PROCEDURE — 25800000 HC PHARMACY IV SOLUTIONS: Performed by: INTERNAL MEDICINE

## 2022-04-17 PROCEDURE — 83605 ASSAY OF LACTIC ACID: CPT | Performed by: EMERGENCY MEDICINE

## 2022-04-17 PROCEDURE — 84484 ASSAY OF TROPONIN QUANT: CPT | Mod: 91 | Performed by: EMERGENCY MEDICINE

## 2022-04-17 PROCEDURE — 99285 EMERGENCY DEPT VISIT HI MDM: CPT | Mod: 25

## 2022-04-17 PROCEDURE — 83735 ASSAY OF MAGNESIUM: CPT | Performed by: EMERGENCY MEDICINE

## 2022-04-17 PROCEDURE — 93005 ELECTROCARDIOGRAM TRACING: CPT | Performed by: EMERGENCY MEDICINE

## 2022-04-17 PROCEDURE — 87040 BLOOD CULTURE FOR BACTERIA: CPT | Performed by: EMERGENCY MEDICINE

## 2022-04-17 PROCEDURE — 63700000 HC SELF-ADMINISTRABLE DRUG: Performed by: HOSPITALIST

## 2022-04-17 PROCEDURE — G1004 CDSM NDSC: HCPCS

## 2022-04-17 PROCEDURE — 25800000 HC PHARMACY IV SOLUTIONS: Performed by: HOSPITALIST

## 2022-04-17 PROCEDURE — 96365 THER/PROPH/DIAG IV INF INIT: CPT | Mod: 59

## 2022-04-17 PROCEDURE — 74177 CT ABD & PELVIS W/CONTRAST: CPT | Mod: MG

## 2022-04-17 PROCEDURE — 83605 ASSAY OF LACTIC ACID: CPT | Performed by: HOSPITALIST

## 2022-04-17 PROCEDURE — 84484 ASSAY OF TROPONIN QUANT: CPT | Performed by: EMERGENCY MEDICINE

## 2022-04-17 PROCEDURE — 36415 COLL VENOUS BLD VENIPUNCTURE: CPT | Performed by: EMERGENCY MEDICINE

## 2022-04-17 PROCEDURE — 1123F ACP DISCUSS/DSCN MKR DOCD: CPT | Performed by: HOSPITALIST

## 2022-04-17 PROCEDURE — 85025 COMPLETE CBC W/AUTO DIFF WBC: CPT | Performed by: EMERGENCY MEDICINE

## 2022-04-17 PROCEDURE — 63600105 HC IODINE BASED CONTRAST: Performed by: EMERGENCY MEDICINE

## 2022-04-17 PROCEDURE — 80053 COMPREHEN METABOLIC PANEL: CPT | Performed by: EMERGENCY MEDICINE

## 2022-04-17 RX ORDER — MORPHINE SULFATE 2 MG/ML
1 INJECTION, SOLUTION INTRAMUSCULAR; INTRAVENOUS
Status: DISCONTINUED | OUTPATIENT
Start: 2022-04-17 | End: 2022-04-19

## 2022-04-17 RX ORDER — KETOROLAC TROMETHAMINE 15 MG/ML
15 INJECTION, SOLUTION INTRAMUSCULAR; INTRAVENOUS EVERY 6 HOURS PRN
Status: DISCONTINUED | OUTPATIENT
Start: 2022-04-17 | End: 2022-04-22 | Stop reason: HOSPADM

## 2022-04-17 RX ORDER — ACETAMINOPHEN 325 MG/1
650 TABLET ORAL ONCE
Status: COMPLETED | OUTPATIENT
Start: 2022-04-17 | End: 2022-04-17

## 2022-04-17 RX ORDER — SODIUM CHLORIDE 9 MG/ML
150 INJECTION, SOLUTION INTRAVENOUS CONTINUOUS
Status: DISCONTINUED | OUTPATIENT
Start: 2022-04-17 | End: 2022-04-18

## 2022-04-17 RX ORDER — SODIUM CHLORIDE 9 MG/ML
250 INJECTION, SOLUTION INTRAVENOUS CONTINUOUS
Status: ACTIVE | OUTPATIENT
Start: 2022-04-17 | End: 2022-04-17

## 2022-04-17 RX ORDER — HEPARIN SODIUM 5000 [USP'U]/ML
5000 INJECTION, SOLUTION INTRAVENOUS; SUBCUTANEOUS EVERY 8 HOURS
Status: DISCONTINUED | OUTPATIENT
Start: 2022-04-17 | End: 2022-04-22 | Stop reason: HOSPADM

## 2022-04-17 RX ORDER — IBUPROFEN 200 MG
16-32 TABLET ORAL AS NEEDED
Status: DISCONTINUED | OUTPATIENT
Start: 2022-04-17 | End: 2022-04-22 | Stop reason: HOSPADM

## 2022-04-17 RX ORDER — SODIUM CHLORIDE 9 MG/ML
1000 INJECTION, SOLUTION INTRAVENOUS CONTINUOUS
Status: ACTIVE | OUTPATIENT
Start: 2022-04-17 | End: 2022-04-17

## 2022-04-17 RX ORDER — ACETAMINOPHEN 325 MG/1
650 TABLET ORAL EVERY 6 HOURS PRN
Status: DISCONTINUED | OUTPATIENT
Start: 2022-04-17 | End: 2022-04-17

## 2022-04-17 RX ORDER — ACETAMINOPHEN 325 MG/1
TABLET ORAL
Status: COMPLETED
Start: 2022-04-17 | End: 2022-04-17

## 2022-04-17 RX ORDER — DEXTROSE 50 % IN WATER (D50W) INTRAVENOUS SYRINGE
25 AS NEEDED
Status: DISCONTINUED | OUTPATIENT
Start: 2022-04-17 | End: 2022-04-22 | Stop reason: HOSPADM

## 2022-04-17 RX ORDER — DEXTROSE 40 %
15-30 GEL (GRAM) ORAL AS NEEDED
Status: DISCONTINUED | OUTPATIENT
Start: 2022-04-17 | End: 2022-04-22 | Stop reason: HOSPADM

## 2022-04-17 RX ORDER — ACETAMINOPHEN 325 MG/1
650 TABLET ORAL EVERY 6 HOURS PRN
Status: DISCONTINUED | OUTPATIENT
Start: 2022-04-17 | End: 2022-04-22 | Stop reason: HOSPADM

## 2022-04-17 RX ADMIN — ACETAMINOPHEN 650 MG: 325 TABLET ORAL at 10:26

## 2022-04-17 RX ADMIN — HEPARIN SODIUM 5000 UNITS: 5000 INJECTION, SOLUTION INTRAVENOUS; SUBCUTANEOUS at 22:29

## 2022-04-17 RX ADMIN — KETOROLAC TROMETHAMINE 15 MG: 15 INJECTION, SOLUTION INTRAMUSCULAR; INTRAVENOUS at 23:56

## 2022-04-17 RX ADMIN — SODIUM CHLORIDE 250 ML: 9 INJECTION, SOLUTION INTRAVENOUS at 12:49

## 2022-04-17 RX ADMIN — MORPHINE SULFATE 1 MG: 2 INJECTION, SOLUTION INTRAMUSCULAR; INTRAVENOUS at 15:12

## 2022-04-17 RX ADMIN — VANCOMYCIN HYDROCHLORIDE 750 MG: 750 INJECTION, POWDER, LYOPHILIZED, FOR SOLUTION INTRAVENOUS at 14:13

## 2022-04-17 RX ADMIN — VANCOMYCIN HYDROCHLORIDE 1 G: 1 INJECTION, POWDER, LYOPHILIZED, FOR SOLUTION INTRAVENOUS at 11:58

## 2022-04-17 RX ADMIN — SODIUM CHLORIDE 150 ML: 9 INJECTION, SOLUTION INTRAVENOUS at 15:06

## 2022-04-17 RX ADMIN — AZTREONAM 2 G: 2 INJECTION, POWDER, LYOPHILIZED, FOR SOLUTION INTRAMUSCULAR; INTRAVENOUS at 10:32

## 2022-04-17 RX ADMIN — ACETAMINOPHEN 650 MG: 325 TABLET ORAL at 23:54

## 2022-04-17 RX ADMIN — ACETAMINOPHEN 650 MG: 325 TABLET ORAL at 15:21

## 2022-04-17 RX ADMIN — SODIUM CHLORIDE 1000 ML: 9 INJECTION, SOLUTION INTRAVENOUS at 10:29

## 2022-04-17 RX ADMIN — HEPARIN SODIUM 5000 UNITS: 5000 INJECTION, SOLUTION INTRAVENOUS; SUBCUTANEOUS at 15:08

## 2022-04-17 RX ADMIN — SODIUM CHLORIDE 1000 ML: 9 INJECTION, SOLUTION INTRAVENOUS at 09:55

## 2022-04-17 RX ADMIN — IOHEXOL 100 ML: 350 INJECTION, SOLUTION INTRAVENOUS at 11:22

## 2022-04-17 RX ADMIN — AZTREONAM 2 G: 2 INJECTION, POWDER, LYOPHILIZED, FOR SOLUTION INTRAMUSCULAR; INTRAVENOUS at 20:35

## 2022-04-17 RX ADMIN — MORPHINE SULFATE 1 MG: 2 INJECTION, SOLUTION INTRAMUSCULAR; INTRAVENOUS at 16:44

## 2022-04-17 ASSESSMENT — ENCOUNTER SYMPTOMS
DIARRHEA: 0
EXTREMITY NUMBNESS: 0
MYALGIAS: 0
VOMITING: 0
ARTHRALGIAS: 1
SINUS PRESSURE: 0
STROKE SYMPTOMS: 0
FLANK PAIN: 0
SORE THROAT: 1
SYNCOPE: 0
STRIDOR: 0
SPEECH DIFFICULTY: 0
FEVER: 1
NUMBNESS: 0
PALPITATIONS: 0
VISUAL CHANGE: 0
ABDOMINAL PAIN: 0
COUGH: 0
CHEST TIGHTNESS: 0
NECK STIFFNESS: 0
ANOREXIA: 0
SHORTNESS OF BREATH: 0
FALLS: 0
FREQUENCY: 0
NAUSEA: 1
TREMORS: 0
FATIGUE: 1
CHILLS: 1
SEIZURES: 0
LETHARGY: 1
LOSS OF CONSCIOUSNESS: 0
RHINORRHEA: 1
HEADACHES: 1
BACK PAIN: 1
HEMATOCHEZIA: 0
SINUS PAIN: 0
DIZZINESS: 0
NEAR-SYNCOPE: 0
WEAKNESS: 0
NECK PAIN: 0
FACIAL ASYMMETRY: 0
DYSURIA: 0
DIAPHORESIS: 0
LIGHT-HEADEDNESS: 0
WHEEZING: 0
JOINT SWELLING: 0

## 2022-04-17 NOTE — H&P
Hospital Medicine Service -  History & Physical        CHIEF COMPLAINT   Fever/dysuria/bilateral flank pain/not feeling well    History from discussion/documentation of ER/EMR & patient.  [facility] was consulted for admission &r further management.   HISTORY OF PRESENT ILLNESS      On my evaluation, 52 female, in mild distress due to stabbing pains in both flank area, coming in for above symptom.  Patient told me her symptoms started on Tuesday, when she was not feeling well, and had chills -she went to urgent care on Wednesday, who saw her/did Covid and flu test-and told her of these are viral symptoms, and need supportive care and bedrest, and was sent home.  Patient also developed fever of 103 on Wednesday.  She continued to feel not well, with generalized weakness, with pains in the back of both bilateral flank area.  She denies cough or phlegm production.  She was progressively feeling weak, with persistent fevers every day, so she went to urgent care again-yesterday, they evaluated the patient, and recommended for bedrest/as seems like viral, and need to have bedrest.  She was having headaches along with fevers, and continued to not feel well, arthralgias, lethargy, nausea, worsening pain in the back and bilateral flank area, with dysuria today-so came to ER.  In ED she was noted to have low blood pressure earlier in 70s-started with fluid resuscitation by ER team-blood pressure improved to 117 systolic and 70s diastolic, CAT scan did show ureteritis/concerns of infection.  She denies urine incontinence, but does have dysuria.  Along with fevers.  No cough/phlegm production/diarrhea/hematuria.    Denies chest pain, denies cardiac or neuro ischemic symptoms.  Labs in ED-significant for leukocytosis, normal lactate, with urethritis and possible concerns of early Martir on CAT scan.  Treatment in ED-vancomycin and Azactam-continue-pharmacy consulted.  ID team is consulted.  Patient told me she feels slightly  better-other than still have intermittent spasmic pains in bilateral flank area.    On my examination patient is resting/comfortable and NAD, stable hemodynamically.    I discussed the risks/benefits & rationale of treatment & need for further workup & monitoring and updated regarding diagnosis/prognosis and treatment.   Further care to be followed along with consults, at which time new recommendations to follow.  PAST MEDICAL AND SURGICAL HISTORY      History reviewed. No pertinent past medical history.    History reviewed. No pertinent surgical history.    MEDICATIONS      Prior to Admission medications    Not on File       ALLERGIES      Red dye, Latex, and Penicillins    FAMILY HISTORY      History reviewed. No pertinent family history.    SOCIAL HISTORY      Social History     Socioeconomic History   • Marital status:      Spouse name: None   • Number of children: None   • Years of education: None   • Highest education level: None     Social Determinants of Health     Food Insecurity: No Food Insecurity   • Worried About Running Out of Food in the Last Year: Never true   • Ran Out of Food in the Last Year: Never true       REVIEW OF SYSTEMS      All other systems reviewed and negative except as noted in HPI    PHYSICAL EXAMINATION      Temp:  [37.4 °C (99.3 °F)-38.1 °C (100.5 °F)] 37.4 °C (99.3 °F)  Heart Rate:  [] 95  Resp:  [13-35] 13  BP: ()/(52-74) 102/54  Body mass index is 32.03 kg/m².    Physical Exam     Constitutional: comfortable.  Sickly look.    HEENT: PERRLA.  Moist membrane, no jaundice.    Neck: supple/no JVD    CVS: regular, tachycardic.    Pulmonary: b/l EAE. Normal breathing.    Abdominal: Soft, No Guarding. +BS.     Musculoskeletal: no spinal tenderness. normal ROM     Extremities : b/l Intact pulses. No cyanosis. No edema     Neurological: alert and awake.  Oriented x3-intact sensations.  Cranial nerves intact.  Normal reflexes.  Moving all 4 extremities-but generalized  deconditioning.    Skin: warm/dry, well perfused.    Psych- cooperative.     LABS / IMAGING / EKG        Labs  Labs from today  reviewed.    Imaging  Imaging reviewed by myself with radiologist read noted.    ECG/Telemetry  Telemetry Monitoring reviewed in ER.    ASSESSMENT AND PLAN         Low blood pressure  Hypotensive earlier-with fevers-septic shock-monitor in stepdown-fluid resuscitation-blood pressure improved to 117/70  Denies dizziness  Antibiotics  Low threshold to move to ICU    Sepsis, due to unspecified organism, unspecified whether acute organ dysfunction present (CMS/[facility])  Septic shock-fever-symptoms ongoing for 4 days with pain in bilateral flank-dysuria  Hypotensive in the FR-krygcgk-uaaohknl after fluid resuscitation  Normal lactate  Significant leukocytosis  IV antibiotics-vancomycin and Azactam-given by ER physician-we will continue  ID consult-for further management and antibiotic stewardship.  Sepsis protocol  Close clinical monitoring      Dehydration  Low sodium-with ongoing fevers/infection-clinically dehydrated  Fluid resuscitation the setting of septic shock-with close monitoring with serial BMPs    Critical care time spent 41 minutes.     VTE Assessment: Padua VTE Score: 3  VTE Treatment Plan: Heparin  Code Status: Full Code  Palliative Care Screening Score: 0  Estimated discharge date:      Janes Santos MD  4/17/2022

## 2022-04-17 NOTE — ASSESSMENT & PLAN NOTE
"ON admission: Septic shock-fever-symptoms ongoing for 4 days with pain in bilateral flank-dysuria  CT Findings consistent with bilateral ureteritis with right greater than left   urothelial enhancement and periureteral fat stranding.   2.  Nonspecific retroperitoneal fat stranding surrounding the abdominal aorta.   Although this could be reactive to the aforementioned findings of ureteritis,   the possibility of inflammatory aortitis should also be considered.  Follow-up   CT is recommended in 4 weeks, following empiric therapy to ensure resolution of   this finding. ----> to be done as OP likely    Overall clinically she says she is feeling much better, cultures have been neg to date, completed course of antibiotics  Still with Fevers (102.8) last night, noted she tells me she has been having fevers prior to admission for \"weeks\" once a day    Urology consulted- no acute intervention    OBGYN appreciated - Ulcerations on labia and also in oral cavity, Fungal/herpes swabs negative. OP follow up     Rheum appreciated, does not feel is Behcet's, or other autoimmune etiology, but more rather a drug reaction (although no commmon meds she is on to cause, and fevers have been going on for weeks she tells me) .   HIV and RPR were negative.    ID following----> discussed with Dr. Silverio at length, and reviewed Rhum notes as above.Recommend CT chest with IV contrast to qualify findings on aorta on initial CT scan, and would recommend Echo as well. Long discussion with patient about our concerns with her ongoing fevers and mouth/labia lesions that we recommend she stay for additional work up and monitoring, however pt is refusing to stay and wants to leave against this medical advice. She is alert and oriented, of sound mind and mentation, and I do believe she has the capacity to make this decision. She said \" I just want to get home\". She was very pleasant to speak with, and thanked us for our care. She also indicated she " will be calling her PCP tomorrow to schedule a follow up appointment. Instead of leaving AMA, as a second best option I will discharge her so she has appropriate follow and discharge paperwork.

## 2022-04-17 NOTE — ED PROVIDER NOTES
Emergency Medicine Note  HPI   HISTORY OF PRESENT ILLNESS       History provided by:  Patient  Weakness - Generalized  Severity:  Moderate  Onset quality:  Gradual  Duration:  1 week  Timing:  Constant  Progression:  Worsening  Chronicity:  New  Context comment:  Pt reports headache and chills on 4/12, was seen by urgent care yesterday and tested negative for covid and flu  Relieved by:  Nothing  Exacerbated by: muscle relaxant.  Ineffective treatments:  Medication, pain relief and rest  Associated symptoms: arthralgias, fever, headaches, lethargy and nausea    Associated symptoms: no abdominal pain, no anorexia, no aphasia, no ataxia, no chest pain, no cough, no diarrhea, no difficulty walking, no dizziness, no drooling, no dysphagia, no dysuria, no numbness in extremities, no falls, no foul-smelling urine, no frequency, no hematochezia, no loss of consciousness, no melena, no myalgias, no near-syncope, no seizures, no sensory-motor deficit, no shortness of breath, no stroke symptoms, no syncope, no urgency, no vision change and no vomiting          Patient History   PAST HISTORY     Reviewed from Nursing Triage:  Allergies  Meds  Problems  Med Hx  Surg Hx  Fam Hx  Soc Hx        History reviewed. No pertinent past medical history.    History reviewed. No pertinent surgical history.    History reviewed. No pertinent family history.    Social History     Tobacco Use   • Smoking status: Never Smoker   Substance Use Topics   • Alcohol use: Never   • Drug use: Never         Review of Systems   REVIEW OF SYSTEMS     Review of Systems   Constitutional: Positive for chills, fatigue and fever. Negative for diaphoresis.   HENT: Positive for rhinorrhea and sore throat. Negative for congestion, drooling, sinus pressure and sinus pain.    Respiratory: Negative for cough, chest tightness, shortness of breath, wheezing and stridor.    Cardiovascular: Negative for chest pain, palpitations, leg swelling, syncope and  near-syncope.   Gastrointestinal: Positive for nausea. Negative for abdominal pain, anorexia, diarrhea, dysphagia, hematochezia, melena and vomiting.   Genitourinary: Negative for dysuria, flank pain, frequency and urgency.   Musculoskeletal: Positive for arthralgias and back pain. Negative for falls, joint swelling, myalgias, neck pain and neck stiffness.   Neurological: Positive for headaches. Negative for dizziness, tremors, seizures, loss of consciousness, syncope, facial asymmetry, speech difficulty, weakness, light-headedness and numbness.         VITALS     ED Vitals    Date/Time Temp Pulse Resp BP SpO2 [facility]   04/17/22 2229 -- 113 24 124/69 95 % AC   04/17/22 2131 -- 121 20 127/70 95 % AC   04/17/22 2034 -- 115 18 122/63 95 % AC   04/17/22 1900 -- 114 21 108/55 94 % R[facility]   04/17/22 1800 -- 116 27 127/60 96 % R[facility]   04/17/22 1700 -- 114 29 121/65 94 % [facility]   04/17/22 1630 -- 112 25 112/66 97 % R[facility]   04/17/22 1600 -- 112 17 111/68 99 % [facility]   04/17/22 1545 -- 108 31 116/60 98 % [facility]   04/17/22 1530 -- 108 18 116/68 -- [facility]   04/17/22 1516 -- 106 23 104/59 99 % [facility]   04/17/22 1515 37.9 °C (100.3 °F) -- -- -- -- R[facility]   04/17/22 1500 -- 103 28 119/74 98 % [facility]   04/17/22 1445 -- 102 22 103/77 94 % [facility]   04/17/22 1430 -- 98 20 124/89 96 % RHL   04/17/22 1400 -- 94 20 111/66 97 % R[facility]   04/17/22 1346 -- 96 16 103/55 97 % R[facility]   04/17/22 1330 -- 95 13 102/54 100 % R[facility]   04/17/22 1315 -- 92 24 113/69 99 % R[facility]   04/17/22 1300 -- 90 20 117/74 99 % R[facility]   04/17/22 1245 -- 91 15 98/64 -- R[facility]   04/17/22 1231 -- 94 14 88/54 -- R[facility]   04/17/22 1220 -- 95 17 84/56 97 % [facility]   04/17/22 1201 -- 96 26 83/54 97 % [facility]   04/17/22 1201 37.4 °C (99.3 °F) -- -- -- -- [facility]   04/17/22 1134 -- 99 14 87/52 96 % [facility]   04/17/22 1100 -- 102 26 112/58 -- R[facility]   04/17/22 1030 -- 102 23 102/58 98 % [facility]   04/17/22 1008 -- 104 35 79/63 99 % [facility]   04/17/22 0947 38.1 °C (100.5 °F) 105 24 89/60 97 % KB        Pulse Ox %: 97 % (04/17/22 1002)  Pulse Ox  Interpretation: Normal (04/17/22 1002)  Heart Rate: 105 (04/17/22 1002)  Rhythm Strip Interpretation: Sinus Tachycardia (04/17/22 1002)     Physical Exam   PHYSICAL EXAM     Physical Exam  Vitals and nursing note reviewed.   Constitutional:       Appearance: Normal appearance.   HENT:      Head: Normocephalic.      Nose: Nose normal.      Mouth/Throat:      Mouth: Mucous membranes are moist.      Pharynx: Oropharynx is clear.   Eyes:      General: No scleral icterus.     Extraocular Movements: Extraocular movements intact.      Conjunctiva/sclera: Conjunctivae normal.      Pupils: Pupils are equal, round, and reactive to light.   Neck:      Vascular: No carotid bruit.   Cardiovascular:      Rate and Rhythm: Regular rhythm. Tachycardia present.      Pulses: Normal pulses.      Heart sounds: Normal heart sounds. No murmur heard.  Pulmonary:      Effort: Pulmonary effort is normal. No respiratory distress.      Breath sounds: Normal breath sounds. No stridor. No wheezing, rhonchi or rales.   Abdominal:      General: Bowel sounds are normal. There is no distension.      Palpations: Abdomen is soft. There is no mass.      Tenderness: There is no abdominal tenderness. There is no right CVA tenderness or left CVA tenderness.      Hernia: No hernia is present.   Musculoskeletal:         General: No swelling or tenderness.      Cervical back: Normal range of motion and neck supple. No rigidity or tenderness.      Right lower leg: No edema.      Left lower leg: No edema.   Lymphadenopathy:      Cervical: No cervical adenopathy.   Skin:     General: Skin is warm and dry.      Capillary Refill: Capillary refill takes less than 2 seconds.      Coloration: Skin is not jaundiced or pale.      Findings: No bruising, erythema, lesion or rash.   Neurological:      General: No focal deficit present.      Mental Status: She is alert and oriented to person, place, and time.      Cranial Nerves: No cranial nerve deficit.      Sensory: No  sensory deficit.      Motor: No weakness.      Deep Tendon Reflexes: Reflexes normal.           PROCEDURES     Critical Care  Performed by: Cyrus Sandoval MD  Authorized by: Cyrus Sandoval MD     Critical care provider statement:     Critical care time (minutes):  40    Critical care time was exclusive of:  Separately billable procedures and treating other patients    Critical care was necessary to treat or prevent imminent or life-threatening deterioration of the following conditions:  Sepsis and shock    Critical care was time spent personally by me on the following activities:  Ordering and performing treatments and interventions, ordering and review of laboratory studies, ordering and review of radiographic studies, pulse oximetry, re-evaluation of patient's condition, examination of patient, evaluation of patient's response to treatment and development of treatment plan with patient or surrogate         DATA     Results     Procedure Component Value Units Date/Time    Blood Culture Blood, Venous [[id number]]  (Normal) Collected: 04/17/22 0952    Specimen: Blood, Venous Updated: 04/19/22 1301     Culture No growth at 48 hours    Blood Culture Blood, Venous [[id number]]  (Normal) Collected: 04/17/22 0955    Specimen: Blood, Venous Updated: 04/19/22 1301     Culture No growth at 48 hours    SARS-CoV-2 (COVID-19), PCR Nasopharynx [[id number]]  (Normal) Collected: 04/17/22 0953    Specimen: Nasopharyngeal Swab from Nasopharynx Updated: 04/17/22 1049    Narrative:      The following orders were created for panel order SARS-CoV-2 (COVID-19), PCR Nasopharynx.  Procedure                               Abnormality         Status                     ---------                               -----------         ------                     SARS-COV-2 (COVID-19)/ F...[[id number]]  Normal              Final result                 Please view results for these tests on the individual orders.    SARS-COV-2 (COVID-19)/ FLU A/B,  AND RSV, PCR Nasopharynx [[id number]]  (Normal) Collected: 04/17/22 0953    Specimen: Nasopharyngeal Swab from Nasopharynx Updated: 04/17/22 1049     SARS-CoV-2 (COVID-19) Negative     Influenza A Negative     Influenza B Negative     Respiratory Syncytial Virus Negative    UA with reflex culture [[id number]]  (Abnormal) Collected: 04/17/22 1023    Specimen: Urine, Clean Catch Updated: 04/17/22 1042    Narrative:      The following orders were created for panel order UA with reflex culture.  Procedure                               Abnormality         Status                     ---------                               -----------         ------                     UA Reflex to Culture (Ma...[[id number]]  Abnormal            Final result               UA Microscopic[[id number]]               Abnormal            Final result                 Please view results for these tests on the individual orders.    UA Microscopic [[id number]]  (Abnormal) Collected: 04/17/22 1023    Specimen: Urine, Clean Catch Updated: 04/17/22 1042     RBC, Urine Too Numerous To Count /HPF      WBC, Urine Too Numerous To Count /HPF      Squamous Epithelial Rare /hpf      Hyaline Cast 0 TO 2 /lpf      Bacteria, Urine Rare /HPF      MUCUSUA Rare /LPF      Yeast, Urine +1 /HPF     UA Reflex to Culture (Macroscopic) [[id number]]  (Abnormal) Collected: 04/17/22 1023    Specimen: Urine, Clean Catch Updated: 04/17/22 1036     Color, Urine Yellow     Clarity, Urine Cloudy     Specific Gravity, Urine 1.019     pH, Urine 6.0     Leukocyte Esterase +3     Nitrite, Urine Negative     Protein, Urine +1     Glucose, Urine Negative mg/dL      Ketones, Urine Negative mg/dL      Urobilinogen, Urine 0.2 EU/dL      Bilirubin, Urine Negative mg/dL      Blood, Urine +3     Comment: The sensitivity of the occult blood test is equivalent to approximately 4 intact RBC/HPF.       HS Troponin I (with 2 hour reflex) [[id number]]  (Normal) Collected: 04/17/22 0952    Specimen:  Blood, Venous Updated: 04/17/22 1035     High Sens Troponin I 5.4 pg/mL     Comprehensive metabolic panel [[id number]]  (Abnormal) Collected: 04/17/22 0952    Specimen: Blood, Venous Updated: 04/17/22 1023     Sodium 128 mEQ/L      Potassium 3.8 mEQ/L      Comment: Results obtained on plasma. Plasma Potassium values may be up to 0.4 mEQ/L less than serum values. The differences may be greater for patients with high platelet or white cell counts.        Chloride 94 mEQ/L      CO2 25 mEQ/L      BUN 21 mg/dL      Creatinine 1.0 mg/dL      Glucose 116 mg/dL      Calcium 8.4 mg/dL      AST (SGOT) 24 IU/L      ALT (SGPT) 28 IU/L      Alkaline Phosphatase 93 IU/L      Total Protein 6.3 g/dL      Comment: Test performed on plasma which typically contains approximately 0.4 g/dL more protein than serum.        Albumin 3.0 g/dL      Bilirubin, Total 1.3 mg/dL      eGFR 58.5 mL/min/1.73m*2      Anion Gap 9 mEQ/L     Magnesium [[id number]]  (Normal) Collected: 04/17/22 0952    Specimen: Blood, Venous Updated: 04/17/22 1023     Magnesium 1.8 mg/dL     CBC and differential [[id number]]  (Abnormal) Collected: 04/17/22 0952    Specimen: Blood, Venous Updated: 04/17/22 1011     WBC 21.09 K/uL      RBC 4.09 M/uL      Hemoglobin 11.0 g/dL      Hematocrit 32.7 %      MCV 80.0 fL      MCH 26.9 pg      MCHC 33.6 g/dL      RDW 15.1 %      Platelets 220 K/uL      MPV 9.8 fL      Differential Type Auto     nRBC 0.0 %      Immature Granulocytes 2.2 %      Neutrophils 87.4 %      Lymphocytes 4.2 %      Monocytes 5.9 %      Eosinophils 0.1 %      Basophils 0.2 %      Immature Granulocytes, Absolute 0.46 K/uL      Neutrophils, Absolute 18.43 K/uL      Lymphocytes, Absolute 0.89 K/uL      Monocytes, Absolute 1.24 K/uL      Eosinophils, Absolute 0.02 K/uL      Basophils, Absolute 0.05 K/uL     Lactate, w/ reflex repeat if > 2.0 [[id number]]  (Normal) Collected: 04/17/22 0952    Specimen: Blood, Venous Updated: 04/17/22 1002     Lactate 1.7 mmol/L            Imaging Results          CT ABDOMEN PELVIS WITH IV CONTRAST (Final result)  Result time 04/17/22 11:43:50    Final result                 Impression:    IMPRESSION:    1.   Findings consistent with bilateral ureteritis with right greater than left  urothelial enhancement and periureteral fat stranding.  2.  Nonspecific retroperitoneal fat stranding surrounding the abdominal aorta.  Although this could be reactive to the aforementioned findings of ureteritis,  the possibility of inflammatory aortitis should also be considered.  Follow-up  CT is recommended in 4 weeks, following empiric therapy to ensure resolution of  this finding.  3.  Borderline splenomegaly             Narrative:    CLINICAL HISTORY:     Flank pain    COMPARISON:    None      COMMENT:  Technique: CT of the Abdomen and Pelvis was performed with IV contrast:  100mL  of iohexoL (OMNIPAQUE 350) 350 mg iodine/mL solution 100 mL  Oral contrast was not administered.  CT DOSE:  One or more dose reduction techniques (e.g. automated exposure  control, adjustment of the mA and/or kV according to patient size, use of  iterative reconstruction technique) utilized for this examination.        COMMENT:    LOWER CHEST:    Lower lungs: Within normal limits.  Visualized heart: Within normal limits.  Distal esophagus:  Normal caliber.    ABDOMEN    Liver: within normal limits.  Bile ducts: Normal caliber.  Gallbladder: No calcified gallstones.  Normal caliber wall.  Pancreas: Within normal limits.  Spleen: Top normal size, measuring 14.3 cm.  Adrenals: within normal limits.  Kidneys: There is bilateral urothelial enhancement involving the renal pelvises  and proximal ureters.  Periureteral fat stranding is noted on the right along  the course of the proximal to mid ureter.  Right greater than left ureteral wall  thickening is present.  There are no obstructing calculi identified.  Delayed imaging reveals normal proximal ureteral  opacification.    PELVIS:  Reproductive organs: Uterine fibroids are noted.  Left ovarian cyst measuring  3.1 cm.  Bladder: within normal limits.    Peritoneum: No free air.  No ascites.  Retroperitoneal fat stranding is noted surrounding the aorta and IVC.    Bowel: Mild retained colonic stool.  Normal bowel caliber.  Vessels:  Scattered atherosclerotic calcifications.  Periaortic fat stranding is  noted surrounding the abdominal aorta and proximal iliac vessels.  Lymph Nodes:  No enlarged nodes  Abdominal wall: within normal limits.  Bones: within normal limits.                                 CT HEAD WITHOUT IV CONTRAST (Final result)  Result time 04/17/22 11:36:07    Final result                 Impression:    IMPRESSION:    1.   No evidence of acute territorial infarction, acute intracranial hemorrhage,  or mass effect.               Narrative:    CLINICAL HISTORY:   Headache, new or worsening (Age >= 50y)    COMPARISON:  None    TECHNIQUE:  Contiguous axial images are acquired through the brain from the base  to vertex without administration of intravenous contrast. The kV and/or mA were  adjusted according to the patient's size and body part for CT dose reduction.    COMMENT:  Brain: Parenchymal architecture is within normal limits.  Ventricles: The ventricles and basilar cisterns are of normal size and  configuration.  Hemorrhage: No acute intracranial hemorrhage.  Mass/Edema: There is no evidence of mass effect, midline shift or parenchymal  edema.  Gray/White:  Gray/white matter differentiation is grossly preserved.  Bones: No acute displaced fracture.  Sinuses: The visualized portions of the paranasal sinuses are free of  significant opacification.  Mastoids: No significant opacification.                                 X-RAY CHEST 1 VIEW (Final result)  Result time 04/17/22 10:41:27    Final result                 Impression:    IMPRESSION:  Top normal heart size                 Narrative:      CLINICAL  HISTORY:  Weakness    COMMENT:    Views: Single frontal portable..    Comparison date: none.    The heart and mediastinal contours are within top normal limits.  The trachea is  midline.  The lungs are clear without discrete infiltrate.  There  are no  pleural effusions.  The osseous structures are intact with mild degenerative  change.                                    ECG 12 lead   ED Interpretation   Sinus tachycardia 103  Nonspecific ST-T wave abnormality      Final Result          Scoring tools                                 ED Course & MDM   MDM / ED COURSE / CLINICAL IMPRESSIONS / DISPO     MDM    ED Course as of 04/19/22 1931   Sun Apr 17, 2022   1019 Cultures, lactate, IV fluids at 30 cc/kg.  Broad-spectrum antibiotics. [JK]   1253 BP improving with fluid resuscitation.  Lactate 1.7. [JK]      ED Course User Index  [JK] Cyrus Sandoval MD         Clinical Impressions as of 04/19/22 1931   Sepsis, due to unspecified organism, unspecified whether acute organ dysfunction present (CMS/[facility])   Ureteritis     Admit / Observation         Cyrus Sandoval MD  04/19/22 1931

## 2022-04-17 NOTE — ASSESSMENT & PLAN NOTE
Hypotensive earlier-with fevers-septic shock-monitor in stepdown-fluid resuscitation-blood pressure improved to 117/70  Denies dizziness  Antibiotics  Low threshold to move to ICU

## 2022-04-17 NOTE — PROGRESS NOTES
Vancomycin Dosing by Pharmacy Consult Initiated    Christa Willett is a 52 y.o. female who has been consulted for vancomycin dosing for sepsis prescribed by Dr. Santos .    Reviewed relevant clinical data including weight, renal function, previous vancomycin doses, and vancomycin levels:  Creatinine   Date/Time Value Ref Range Status   04/17/2022 0952 1.0 0.6 - 1.1 mg/dL Final           Vancomycin Administrations (last 96 hours)       Date/Time Action Medication Dose Rate    04/17/22 1158 New Bag    vancomycin 1 g/250 mL IVPB in NSS 1 g 166.7 mL/hr            Assessment/Plan  The patient is ordered vancomycin dosing by pharmacy.    The dose will be based on:         Wt Readings from Last 1 Encounters:   04/17/22 74.4 kg (164 lb)         Estimated Creatinine Clearance: 60 mL/min by (C-G formula)      Will initiate a loading dose   1750 mg IV x1 and maintenance dose of 1000 mg IV every 12 hours.    Target a trough of 15-20 ug/mL per vancomycin dosing per pharmacy order.  Pharmacy will continue to follow the patient's vancomycin dosing daily.      Please call vancomycin levels to the pharmacy.  Catherine Roland, LynD

## 2022-04-18 PROBLEM — I95.9 LOW BLOOD PRESSURE: Status: RESOLVED | Noted: 2022-04-17 | Resolved: 2022-04-18

## 2022-04-18 PROBLEM — E87.1 HYPONATREMIA: Status: ACTIVE | Noted: 2022-04-18

## 2022-04-18 PROBLEM — E87.6 HYPOKALEMIA: Status: ACTIVE | Noted: 2022-04-18

## 2022-04-18 LAB
ANION GAP SERPL CALC-SCNC: 6 MEQ/L (ref 3–15)
BASOPHILS # BLD: 0 K/UL (ref 0.01–0.1)
BASOPHILS NFR BLD: 0 %
BUN SERPL-MCNC: 11 MG/DL (ref 8–20)
CALCIUM SERPL-MCNC: 6.8 MG/DL (ref 8.9–10.3)
CHLORIDE SERPL-SCNC: 105 MEQ/L (ref 98–109)
CO2 SERPL-SCNC: 23 MEQ/L (ref 22–32)
CREAT SERPL-MCNC: 0.6 MG/DL (ref 0.6–1.1)
DIFFERENTIAL METHOD BLD: ABNORMAL
EOSINOPHIL # BLD: 0 K/UL (ref 0.04–0.36)
EOSINOPHIL NFR BLD: 0 %
ERYTHROCYTE [DISTWIDTH] IN BLOOD BY AUTOMATED COUNT: 15.5 % (ref 11.7–14.4)
GFR SERPL CREATININE-BSD FRML MDRD: >60 ML/MIN/1.73M*2
GLUCOSE BLD-MCNC: 173 MG/DL (ref 70–99)
GLUCOSE SERPL-MCNC: 173 MG/DL (ref 70–99)
HAV IGM SER QL: NONREACTIVE
HBV CORE IGM SER QL: NONREACTIVE
HBV SURFACE AG SER QL: NONREACTIVE
HCT VFR BLDCO AUTO: 27.7 % (ref 35–45)
HCV AB SER QL: NONREACTIVE
HGB BLD-MCNC: 9.1 G/DL (ref 11.8–15.7)
HYPOCHROMIA BLD QL SMEAR: ABNORMAL
LYMPHOCYTES # BLD: 1.81 K/UL (ref 1.2–3.5)
LYMPHOCYTES NFR BLD: 9 %
MCH RBC QN AUTO: 26.7 PG (ref 28–33.2)
MCHC RBC AUTO-ENTMCNC: 32.9 G/DL (ref 32.2–35.5)
MCV RBC AUTO: 81.2 FL (ref 83–98)
METAMYELOCYTES # BLD MANUAL: 0.6 K/UL
METAMYELOCYTES NFR BLD MANUAL: 3 %
MICROCYTES BLD QL SMEAR: ABNORMAL
MONOCYTES # BLD: 0.6 K/UL (ref 0.28–0.8)
MONOCYTES NFR BLD: 3 %
NEUTS BAND # BLD: 15.05 K/UL (ref 1.7–7)
NEUTS BAND # BLD: 2.01 K/UL (ref 0–0.53)
NEUTS BAND NFR BLD: 10 %
NEUTS SEG NFR BLD: 75 %
PDW BLD AUTO: 9.9 FL (ref 9.4–12.3)
PLAT MORPH BLD: NORMAL
PLATELET # BLD AUTO: 185 K/UL (ref 150–369)
PLATELET # BLD EST: ABNORMAL 10*3/UL
POCT TEST: ABNORMAL
POTASSIUM SERPL-SCNC: 3.2 MEQ/L (ref 3.6–5.1)
RBC # BLD AUTO: 3.41 M/UL (ref 3.93–5.22)
SODIUM SERPL-SCNC: 134 MEQ/L (ref 136–144)
TOXIC GRANULES BLD QL SMEAR: SLIGHT
WBC # BLD AUTO: 20.06 K/UL (ref 3.8–10.5)

## 2022-04-18 PROCEDURE — 87591 N.GONORRHOEAE DNA AMP PROB: CPT | Performed by: NURSE PRACTITIONER

## 2022-04-18 PROCEDURE — 86592 SYPHILIS TEST NON-TREP QUAL: CPT | Performed by: NURSE PRACTITIONER

## 2022-04-18 PROCEDURE — 25800000 HC PHARMACY IV SOLUTIONS: Performed by: HOSPITALIST

## 2022-04-18 PROCEDURE — 36415 COLL VENOUS BLD VENIPUNCTURE: CPT | Performed by: HOSPITALIST

## 2022-04-18 PROCEDURE — 63600000 HC DRUGS/DETAIL CODE: Performed by: HOSPITALIST

## 2022-04-18 PROCEDURE — 85025 COMPLETE CBC W/AUTO DIFF WBC: CPT | Performed by: HOSPITALIST

## 2022-04-18 PROCEDURE — 80048 BASIC METABOLIC PNL TOTAL CA: CPT | Performed by: HOSPITALIST

## 2022-04-18 PROCEDURE — 87389 HIV-1 AG W/HIV-1&-2 AB AG IA: CPT | Performed by: NURSE PRACTITIONER

## 2022-04-18 PROCEDURE — 25800000 HC PHARMACY IV SOLUTIONS: Performed by: NURSE PRACTITIONER

## 2022-04-18 PROCEDURE — 80074 ACUTE HEPATITIS PANEL: CPT | Performed by: NURSE PRACTITIONER

## 2022-04-18 PROCEDURE — 25000000 HC PHARMACY GENERAL: Performed by: HOSPITALIST

## 2022-04-18 PROCEDURE — 99233 SBSQ HOSP IP/OBS HIGH 50: CPT | Performed by: HOSPITALIST

## 2022-04-18 PROCEDURE — 63600000 HC DRUGS/DETAIL CODE: Performed by: NURSE PRACTITIONER

## 2022-04-18 PROCEDURE — 20600000 HC ROOM AND CARE INTERMEDIATE/TELEMETRY

## 2022-04-18 PROCEDURE — 63700000 HC SELF-ADMINISTRABLE DRUG: Performed by: HOSPITALIST

## 2022-04-18 RX ORDER — POTASSIUM CHLORIDE 14.9 MG/ML
20 INJECTION INTRAVENOUS ONCE
Status: COMPLETED | OUTPATIENT
Start: 2022-04-18 | End: 2022-04-18

## 2022-04-18 RX ORDER — CALCIUM CARBONATE 200(500)MG
500 TABLET,CHEWABLE ORAL 3 TIMES DAILY PRN
Status: DISCONTINUED | OUTPATIENT
Start: 2022-04-18 | End: 2022-04-22 | Stop reason: HOSPADM

## 2022-04-18 RX ORDER — SODIUM CHLORIDE 9 MG/ML
INJECTION, SOLUTION INTRAVENOUS CONTINUOUS
Status: DISCONTINUED | OUTPATIENT
Start: 2022-04-18 | End: 2022-04-20

## 2022-04-18 RX ORDER — ONDANSETRON HYDROCHLORIDE 2 MG/ML
4 INJECTION, SOLUTION INTRAVENOUS EVERY 6 HOURS PRN
Status: DISCONTINUED | OUTPATIENT
Start: 2022-04-18 | End: 2022-04-22 | Stop reason: HOSPADM

## 2022-04-18 RX ADMIN — KETOROLAC TROMETHAMINE 15 MG: 15 INJECTION, SOLUTION INTRAMUSCULAR; INTRAVENOUS at 11:17

## 2022-04-18 RX ADMIN — ONDANSETRON HYDROCHLORIDE 4 MG: 2 SOLUTION INTRAMUSCULAR; INTRAVENOUS at 21:28

## 2022-04-18 RX ADMIN — KETOROLAC TROMETHAMINE 15 MG: 15 INJECTION, SOLUTION INTRAMUSCULAR; INTRAVENOUS at 17:49

## 2022-04-18 RX ADMIN — AZTREONAM 2 G: 2 INJECTION, POWDER, LYOPHILIZED, FOR SOLUTION INTRAMUSCULAR; INTRAVENOUS at 02:36

## 2022-04-18 RX ADMIN — ACETAMINOPHEN 650 MG: 325 TABLET ORAL at 15:49

## 2022-04-18 RX ADMIN — HEPARIN SODIUM 5000 UNITS: 5000 INJECTION, SOLUTION INTRAVENOUS; SUBCUTANEOUS at 14:33

## 2022-04-18 RX ADMIN — SODIUM CHLORIDE 150 ML: 9 INJECTION, SOLUTION INTRAVENOUS at 06:44

## 2022-04-18 RX ADMIN — POTASSIUM CHLORIDE 20 MEQ: 200 INJECTION, SOLUTION INTRAVENOUS at 12:41

## 2022-04-18 RX ADMIN — POTASSIUM CHLORIDE 20 MEQ: 200 INJECTION, SOLUTION INTRAVENOUS at 14:33

## 2022-04-18 RX ADMIN — AZTREONAM 2 G: 2 INJECTION, POWDER, LYOPHILIZED, FOR SOLUTION INTRAMUSCULAR; INTRAVENOUS at 10:34

## 2022-04-18 RX ADMIN — CEFTRIAXONE SODIUM 1 G: 1 INJECTION, POWDER, FOR SOLUTION INTRAMUSCULAR; INTRAVENOUS at 12:51

## 2022-04-18 RX ADMIN — VANCOMYCIN HYDROCHLORIDE 1000 MG: 1 INJECTION, POWDER, LYOPHILIZED, FOR SOLUTION INTRAVENOUS at 11:18

## 2022-04-18 RX ADMIN — ACETAMINOPHEN 650 MG: 325 TABLET ORAL at 06:52

## 2022-04-18 RX ADMIN — KETOROLAC TROMETHAMINE 15 MG: 15 INJECTION, SOLUTION INTRAMUSCULAR; INTRAVENOUS at 05:30

## 2022-04-18 RX ADMIN — VANCOMYCIN HYDROCHLORIDE 1000 MG: 1 INJECTION, POWDER, LYOPHILIZED, FOR SOLUTION INTRAVENOUS at 00:02

## 2022-04-18 RX ADMIN — KETOROLAC TROMETHAMINE 15 MG: 15 INJECTION, SOLUTION INTRAMUSCULAR; INTRAVENOUS at 23:31

## 2022-04-18 RX ADMIN — HEPARIN SODIUM 5000 UNITS: 5000 INJECTION, SOLUTION INTRAVENOUS; SUBCUTANEOUS at 21:18

## 2022-04-18 RX ADMIN — SODIUM CHLORIDE: 9 INJECTION, SOLUTION INTRAVENOUS at 16:23

## 2022-04-18 ASSESSMENT — PATIENT HEALTH QUESTIONNAIRE - PHQ9: SUM OF ALL RESPONSES TO PHQ9 QUESTIONS 1 & 2: 0

## 2022-04-18 NOTE — PROGRESS NOTES
Vancomycin Dosing by Pharmacy Consult Follow up    Christa Willett is a 52 y.o. female who has been consulted for vancomycin dosing for sepsis.    Reviewed relevant clinical data including weight, renal function, previous vancomycin doses, and vancomycin levels  Creatinine   Date/Time Value Ref Range Status   04/18/2022 0800 0.6 0.6 - 1.1 mg/dL Final   04/17/2022 0952 1.0 0.6 - 1.1 mg/dL Final     No results found for: VANCORANDOM, VANCOTROUGH, VANCOPEAK      Vancomycin Administrations (last 96 hours)       Date/Time Action Medication Dose Rate    04/18/22 0002 New Bag    vancomycin 1 g/250 mL IVPB in NSS 1,000 mg 166.7 mL/hr    04/17/22 1413 New Bag    vancomycin 750 mg/250 mL IVPB in  mg 250 mL/hr    04/17/22 1158 New Bag    vancomycin 1 g/250 mL IVPB in NSS 1 g 166.7 mL/hr              Assessment/Plan  The patient is ordered vancomycin dosing by pharmacy.      The patient’s renal function; is improving    Will adjust dose to vancomycin 1000 mg IV Q 8 H .      Next trough:  4/19/22 @ 10:00    Pharmacy will continue to follow the patient’s vancomycin dosing daily during this course of therapy.      Please call vancomycin levels to the pharmacy.  Kaushal Morgan RPh

## 2022-04-18 NOTE — PATIENT CARE CONFERENCE
Care Progression Rounds Note  Date: 4/18/2022  Time: 11:56 AM     Patient Name: Christa Willett     Medical Record Number: [id number]   YOB: 1970  Sex: Female      Room/Bed: 2509    Admitting Diagnosis: Ureteritis [N28.89]  Sepsis, due to unspecified organism, unspecified whether acute organ dysfunction present (CMS/HCC) [A41.9]   Admit Date/Time: 4/17/2022  9:42 AM    Primary Diagnosis: Sepsis, due to unspecified organism, unspecified whether acute organ dysfunction present (CMS/HCC)  Principal Problem: Sepsis, due to unspecified organism, unspecified whether acute organ dysfunction present (CMS/HCC)    GMLOS: pending  Anticipated Discharge Date: 4/21/2022    AM-PAC:  Mobility Score:      Discharge Planning:       Barriers to Discharge:  Medical issues not resolved    Comments:  hypotensive, temps, flank pain, IV Abt, K low, Sepsis, IV fluids,    Participants:  social work/services, nursing, dietitian/nutrition services,

## 2022-04-18 NOTE — PLAN OF CARE
Problem: Adult Inpatient Plan of Care  Goal: Readiness for Transition of Care  Intervention: Mutually Develop Transition Plan  Flowsheets (Taken 2022 1528)  Anticipated Discharge Disposition: home without assistance or services  Equipment Needed After Discharge: none  Discharge Coordination/Progress: see POC/Note    Met with pt at bedside and discussed discharge plan; name,, address, phone number, emergency contact and PCP confirmed.  Role of Care Coordination explained.  Initial assessment completed.    Pt. lives alone in a 1st floor apartment, 3 steps to enter.    PLOF: Independent with ADL's  DME:  none  HOME CARE SERVICES: none, pt does not feel she will need HC.   [facility]:pt has access to food, clothing and shelter.  VACCINE STATUS:  [employer] September and October  DISPOSITION: anticipate discharge home without services when medically stable.     CC will continue to follow and assist with discharge planning.     Assistive Device/Animal Currently Used at Home: none  Anticipated Changes Related to Illness: none  Transportation Concerns: car, none  Readmission Within the Last 30 Days: no previous admission in last 30 days  Patient/Family Anticipated Services at Transition: none  Patient/Family Anticipates Transition to: home  Transportation Anticipated: family or friend will provide  Concerns to be Addressed:   denies needs/concerns at this time   no discharge needs identified

## 2022-04-18 NOTE — PROGRESS NOTES
Hospital Medicine Service -  Daily Progress Note       SUBJECTIVE   Interval History: Feels weak and tired, still with flank pain     OBJECTIVE      Vital signs in last 24 hours:  Temp:  [36.8 °C (98.2 °F)-38.5 °C (101.3 °F)] 36.8 °C (98.2 °F)  Heart Rate:  [] 101  Resp:  [13-36] 28  BP: ()/(54-89) 119/69    Intake/Output Summary (Last 24 hours) at 4/18/2022 1219  Last data filed at 4/18/2022 1042  Gross per 24 hour   Intake 3445 ml   Output 1000 ml   Net 2445 ml       PHYSICAL EXAMINATION      Physical Exam    General:    Looks ill and tired   Head:    Normocephalic, atraumatic   Eyes:     EOMI BL            Lungs:     respirations unlabored   Heart:    RRR,    Abdomen:     Soft, mild lower abd ttp   Extremities:  Musculoskeletal:    no cyanosis or edema    moves all limbs               Neurologic:  Behavior/Emotional:   AAOx3. Grossly nonfocal    Appropriate, odd affect      LINES, CATHETERS, DRAINS, AIRWAYS, AND WOUNDS   Lines, Drains, and Airways:  Wounds (agree with documentation and present on admission):  Peripheral IV (Adult) 04/17/22 Left Antecubital (Active)   Number of days: 1       Peripheral IV (Adult) 04/17/22 Right Antecubital (Active)   Number of days: 1         Comments:      LABS / IMAGING / TELE      Labs  I have reviewed the patient's pertinent labs.  Significant abnormals are k 3.2.    SARS-CoV-2 (COVID-19) (no units)   Date/Time Value   04/17/2022 0953 Negative       Imaging  I have independently reviewed the pertinent imaging from the last 24 hrs.    ECG/Telemetry  I have independently reviewed the telemetry. No events for the last 24 hours.    ASSESSMENT AND PLAN      Hypokalemia  Assessment & Plan  k 3.2, will repleat and monitor    Hyponatremia  Assessment & Plan  Improving, cont IV fluids, was likely dry    Dehydration  Assessment & Plan  Low PO intake PTA with hyponatremia  Cont supportive IV fluids  Encourage PO intake    Sepsis, due to unspecified organism, unspecified  whether acute organ dysfunction present (CMS/[facility])  Assessment & Plan  ON admission: Septic shock-fever-symptoms ongoing for 4 days with pain in bilateral flank-dysuria  CT Findings consistent with bilateral ureteritis with right greater than left   urothelial enhancement and periureteral fat stranding.   2.  Nonspecific retroperitoneal fat stranding surrounding the abdominal aorta.   Although this could be reactive to the aforementioned findings of ureteritis,   the possibility of inflammatory aortitis should also be considered.  Follow-up   CT is recommended in 4 weeks, following empiric therapy to ensure resolution of   this finding.     Overall stable, likely  source  Urology consulted, watch for retention  ID to see, cont antibiotics, follow up cultures         VTE Assessment: Padua VTE Score: 3  VTE Prophylaxis:  Current anticoagulants:  heparin (porcine) 5,000 unit/mL injection 5,000 Units, subcutaneous, q8h ALDEN      Code Status: Full Code  Palliative Care Screening Score: 0   Estimated Discharge Date: 4/21/2022     Disposition Planning: cont inpt mngt, follow up cultures     Jarret Green MD  4/18/2022

## 2022-04-18 NOTE — ASSESSMENT & PLAN NOTE
k 3.1  , will repleat and monitor  Will discharge on K supplement for one week with close OP follow up for monitoring

## 2022-04-18 NOTE — PROGRESS NOTES
Vancomycin Dosing by Pharmacy Consult Discontinued    IV Vancomycin Dosing by Pharmacy Protocol was discontinued.  Pharmacy will sign off and no longer dose vancomycin for this patient.    Kaushal Morgan, h

## 2022-04-18 NOTE — PLAN OF CARE
"  Problem: Adult Inpatient Plan of Care  Goal: Patient-Specific Goal (Individualized)  Outcome: Progressing  Flowsheets (Taken 4/18/2022 0958)  Patient-Specific Goals (Include Timeframe): Patients goal are to manage pain and decrease level to 5/10 or lower this shift utilizing PRN pain medications as well as non-pharmacological methods like repositioning, relaxation techniques, distraction with the television and smart phone.  Individualized Care Needs: Patient provided pillows for comfort and freqent repositioning.  Anxieties, Fears or Concerns: Patient is anxious about flank pain.   Plan of Care Review  Plan of Care Reviewed With: patient  Progress: improving  Outcome Summary: Patient is AOX3. Patient complains of persistant flank pain that radiates to both of her shoulders. Patient states they feel like \"spasms\" and is requesting muscle relaxant. Dr. Rosario notified and stated he wants to assess patient first. Post voiding patient has also been having red/ pink tinged urine. Patient states it is her menses but Dr. Green also notified of occurance. Night shift held 0600 SQ heparin due to this and drop in hbg from yesterday. Patients VSS. Patient has no other complaints at this time. Patient appetite is poor but she was able to tolerate cream of wheat and some liquids. IV fluids infusing. Patient is tachypnic but states she does not feel SOB. Nurse will continue to closely monitor.  "

## 2022-04-18 NOTE — CONSULTS
Infectious Disease Consult Note    Patient Name: Christa Willett  MR#: [id number]  : 1970  Admission Date: 2022  Consult Date: 22 11:56 AM   Consultant: KAYLEIGH Ness    Reason for Consult: sepsis  Referring Provider: Dr Santos      History of Present Illness     Christa Willett is a 52 y.o. female who was admitted on 2022 with bilateral back pain for the past 6 days with progressive worsening and development of additional symptoms including fever, chills, weakness, dysuria, nausea, lethargy, arthralgias, and headache.  She is unclear if she had hematuria because she reports she has her menses presently.  She was evaluated in Urgent care last week and diagnosed with viral syndrome but with worsening came to the ER for evaluation.  She had a temp of 101.3 last night and labs reveal leukocytosis.  A CT was also performed which is abnormal and the results are noted below.  She denies any antibiotic therapy prior to admission.  She also complains of painful pimple like sores on her labia.  She denies any recent sexual activity but is agreeable to HIV testing as she had been sexually active in the past.     Outside records were reviewed but limited information available.    Allergies:   Allergies   Allergen Reactions   • Red Dye    • Latex Rash   • Penicillins Rash       Medical History: History reviewed. No pertinent past medical history.    Surgical History: History reviewed. No pertinent surgical history.    Social History:   Social History     Socioeconomic History   • Marital status:      Spouse name: None   • Number of children: None   • Years of education: None   • Highest education level: None   Tobacco Use   • Smoking status: Never Smoker   Substance and Sexual Activity   • Alcohol use: Never   • Drug use: Never   • Sexual activity: Never     Social Determinants of Health     Food Insecurity: No Food Insecurity   • Worried About Running Out of Food in the Last Year: Never  "true   • Ran Out of Food in the Last Year: Never true          Travel Exposure:   Travel and Exposure Screening    Flowsheet Row Most Recent Value   Travel Screening    Overnight hospitalization outside the [place] in the last year? No Filed On: 04/17/2022 0950   Exposure Screening    Symptoms           Animal Exposure: none    Other Exposure: none    Family History: History reviewed. No pertinent family history.    Review of Systems    Constitutional: positive for sweats, malaise, fevers, fatigue, chills and anorexia, negative for difficulty drinking and difficulty eating  Ears, nose, mouth, throat, and face: negative for nasal congestion and sore throat  Respiratory: positive for dyspnea on exertion, negative for cough, pleurisy/chest pain and sputum  Gastrointestinal: positive for abdominal pain and nausea, negative for diarrhea and vomiting  Genitourinary:positive for decreased stream, dysuria and hesitancy, negative for urinary incontinence  Hematologic/lymphatic: positive for bleeding, negative for easy bruising  Musculoskeletal:positive for arthralgias, back pain and myalgias, negative for stiff joints  Neurological: positive for headaches and weakness, negative for memory problems and speech problems    Medications:    Current IP Meds (From admission, onward)        Frequency     potassium chloride 20 mEq in 100 mL IVPB  (premix)  (potassium chloride IV)        \"Followed by\" Linked Group Details    Once     potassium chloride 20 mEq in 100 mL IVPB  (premix)  (potassium chloride IV)        \"Followed by\" Linked Group Details    Once     vancomycin 1 g/250 mL IVPB in NSS  (Vancomycin IV therapy by Pharmacy Protocol)        \"And\" Linked Group Details    Every 8 hours interval     vancomycin 1 g/250 mL IVPB in NSS  (Vancomycin IV therapy by Pharmacy Protocol)  Status:  Discontinued        \"And\" Linked Group Details    Every 12 hours interval     ketorolac (TORADOL) injection 15 mg         Every 6 hours PRN     " "acetaminophen (TYLENOL) tablet 650 mg         Every 6 hours PRN     acetaminophen (TYLENOL) tablet 650 mg  Status:  Discontinued         Every 6 hours PRN     aztreonam (AZACTAM) IVPB 2 g in 100 mL NSS vial in bag         Every 8 hours interval     acetaminophen (TYLENOL) tablet 650 mg         Once     sodium chloride 0.9 % infusion 150 mL  (Saline Fluids)         Continuous     heparin (porcine) 5,000 unit/mL injection 5,000 Units  (Assessed at increased VTE risk (Padua score greater than or equal to 4))         Every 8 hours     morphine injection 1 mg         Every 3 hours PRN     glucose chewable tablet 16-32 g of dextrose  (Hypoglycemia Treatment Protocol and Hyperglycemia Validation Protocol)        \"Or\" Linked Group Details    As needed     dextrose 40 % oral gel 15-30 g of dextrose  (Hypoglycemia Treatment Protocol and Hyperglycemia Validation Protocol)        \"Or\" Linked Group Details    As needed     glucagon (GLUCAGEN) injection 1 mg  (Hypoglycemia Treatment Protocol and Hyperglycemia Validation Protocol)        \"Or\" Linked Group Details    As needed     dextrose in water injection 12.5 g  (Hypoglycemia Treatment Protocol and Hyperglycemia Validation Protocol)        \"Or\" Linked Group Details    As needed     vancomycin 750 mg/250 mL IVPB in NSS         Once     sodium chloride 0.9 % bolus 250 mL  (IV Bolus - Normal Saline)        \"Followed by\" Linked Group Details    Continuous     aztreonam (AZACTAM) IVPB 2 g in 100 mL NSS vial in bag  (IV Antibiotics)         Once     vancomycin 1 g/250 mL IVPB in NSS  (IV Antibiotics)         Once     sodium chloride 0.9 % bolus 1,000 mL         Once          Anti-infectives (From admission, onward)    Start     Dose/Rate Route Frequency Ordered Stop    04/18/22 1100  vancomycin 1 g/250 mL IVPB in NSS        \"And\" Linked Group Details    1,000 mg  166.7 mL/hr over 90 Minutes intravenous Every 8 hours interval 04/18/22 1016      04/17/22 1830  aztreonam (AZACTAM) IVPB " 2 g in 100 mL NSS vial in bag         2 g  100 mL/hr over 60 Minutes intravenous Every 8 hours interval 04/17/22 1352              Objective     Vital Signs:    Patient Vitals for the past 72 hrs:   BP Temp Temp src Pulse Resp SpO2 Height Weight   04/18/22 1000 119/69 -- -- (!) 101 (!) 28 93 % -- --   04/18/22 0800 112/73 36.8 °C (98.2 °F) Oral (!) 101 (!) 29 97 % -- --   04/18/22 0721 -- 36.8 °C (98.2 °F) Oral (!) 101 (!) 27 95 % -- --   04/18/22 0652 -- 38 °C (100.4 °F) -- -- -- -- -- --   04/18/22 0600 114/70 37.7 °C (99.9 °F) Oral (!) 102 (!) 25 96 % -- --   04/18/22 0400 104/70 37.6 °C (99.6 °F) Oral (!) 103 (!) 24 99 % -- --   04/18/22 0200 115/72 -- -- (!) 109 (!) 36 92 % -- --   04/18/22 0005 -- -- -- -- -- -- 1.524 m (5') 83 kg (183 lb)   04/18/22 0000 127/78 (!) 38.5 °C (101.3 °F) Oral (!) 116 (!) 29 97 % -- --   04/17/22 2229 124/69 -- -- (!) 113 (!) 24 95 % -- --   04/17/22 2131 127/70 -- -- (!) 121 20 95 % -- --   04/17/22 2034 122/63 -- -- (!) 115 18 95 % -- --   04/17/22 1900 (!) 108/55 -- -- (!) 114 (!) 21 94 % -- --   04/17/22 1800 127/60 -- -- (!) 116 (!) 27 96 % -- --   04/17/22 1700 121/65 -- -- (!) 114 (!) 29 94 % -- --   04/17/22 1630 112/66 -- -- (!) 112 (!) 25 97 % -- --   04/17/22 1600 111/68 -- -- (!) 112 17 99 % -- --   04/17/22 1545 116/60 -- -- (!) 108 (!) 31 98 % -- --   04/17/22 1530 116/68 -- -- (!) 108 18 -- -- --   04/17/22 1516 (!) 104/59 -- -- (!) 106 (!) 23 99 % -- --   04/17/22 1515 -- 37.9 °C (100.3 °F) -- -- -- -- -- --   04/17/22 1500 119/74 -- -- (!) 103 (!) 28 98 % -- --   04/17/22 1445 103/77 -- -- (!) 102 (!) 22 94 % -- --   04/17/22 1430 124/89 -- -- 98 20 96 % -- --   04/17/22 1400 111/66 -- -- 94 20 97 % -- --   04/17/22 1346 (!) 103/55 -- -- 96 16 97 % -- --   04/17/22 1330 (!) 102/54 -- -- 95 13 100 % -- --   04/17/22 1315 113/69 -- -- 92 (!) 24 99 % -- --   04/17/22 1300 117/74 -- -- 90 20 99 % -- --   04/17/22 1245 98/64 -- -- 91 15 -- -- --   04/17/22 1231 (!)  88/54 -- -- 94 14 -- -- --   22 1220 (!) 84/56 -- -- 95 17 97 % -- --   22 1201 (!) 83/54 -- -- 96 (!) 26 97 % -- --   22 1201 -- 37.4 °C (99.3 °F) Oral -- -- -- -- --   22 1134 (!) 87/52 -- -- 99 14 96 % -- --   22 1100 (!) 112/58 -- -- (!) 102 (!) 26 -- -- --   22 1030 (!) 102/58 -- -- (!) 102 (!) 23 98 % -- --   22 1008 (!) 79/63 -- -- (!) 104 (!) 35 99 % -- --   22 0947 (!) 89/60 (!) 38.1 °C (100.5 °F) -- (!) 105 (!) 24 97 % 1.524 m (5') 74.4 kg (164 lb)       Temp (72hrs), Av.6 °C (99.7 °F), Min:36.8 °C (98.2 °F), Max:38.5 °C (101.3 °F)      Physical Exam:    Visit Vitals  /69 (BP Location: Right upper arm, Patient Position: Lying)   Pulse (!) 101   Temp 36.8 °C (98.2 °F) (Oral)   Resp (!) 28   Ht 1.524 m (5')   Wt 83 kg (183 lb)   LMP 04/15/2022 (Exact Date)   SpO2 93%   BMI 35.74 kg/m²     General appearance: alert, appears stated age, cooperative, fatigued and no distress  Eyes: anicteric  Back: bilateral CVA tenderness  Lungs: clear to auscultation bilaterally  Abdomen: soft, rounded, tenderness in the lower abdomen  Female genitalia: small tender papule versus pustule at the upper right labia and lower left labia, no open wound, drainage or edema  Extremities: no edema, normal ROM of the BLE  Neurologic: Mental status: Alert, oriented, thought content appropriate    Lines, Drains, Airways, Wounds:  Peripheral IV (Adult) 22 Left Antecubital (Active)   Number of days: 1       Peripheral IV (Adult) 22 Right Antecubital (Active)   Number of days: 1       Labs:    CBC Results       22     0800 0952    WBC 20.06 21.09    RBC 3.41 4.09    HGB 9.1 11.0    HCT 27.7 32.7    MCV 81.2 80.0    MCH 26.7 26.9    MCHC 32.9 33.6     220      BMP Results       22     0800 0952     128    K 3.2 3.8    Cl 105 94    CO2 23 25    Glucose 173 116    BUN 11 21    Creatinine 0.6 1.0    Calcium 6.8 8.4    Anion Gap 6 9     EGFR >60.0 58.5         Comment for K at 0952 on 04/17/22: Results obtained on plasma. Plasma Potassium values may be up to 0.4 mEQ/L less than serum values. The differences may be greater for patients with high platelet or white cell counts.      PT/PTT Results    No lab values to display.     UA Results       04/17/22     1023    Color Yellow    Clarity Cloudy    Glucose Negative    Bilirubin Negative    Ketones Negative    Sp Grav 1.019    Blood +3    Ph 6.0    Protein +1    Urobilinogen 0.2    Nitrite Negative    Leuk Est +3    WBC Too Numerous To Count    RBC Too Numerous To Count    Bacteria Rare         Comment for Blood at 1023 on 04/17/22: The sensitivity of the occult blood test is equivalent to approximately 4 intact RBC/HPF.      Lactate Results       04/17/22 04/17/22     1603 0952    Lactate 1.0 1.7          Microbiology Results     Procedure Component Value Units Date/Time    Urine culture Urine, Clean Catch [[id number]]  (Normal) Collected: 04/17/22 1023    Specimen: Urine, Clean Catch Updated: 04/18/22 0944     Urine Culture No growth to date    SARS-CoV-2 (COVID-19), PCR Nasopharynx [[id number]]  (Normal) Collected: 04/17/22 0953    Specimen: Nasopharyngeal Swab from Nasopharynx Updated: 04/17/22 1049    Narrative:      The following orders were created for panel order SARS-CoV-2 (COVID-19), PCR Nasopharynx.  Procedure                               Abnormality         Status                     ---------                               -----------         ------                     SARS-COV-2 (COVID-19)/ F...[[id number]]  Normal              Final result                 Please view results for these tests on the individual orders.    SARS-COV-2 (COVID-19)/ FLU A/B, AND RSV, PCR Nasopharynx [[id number]]  (Normal) Collected: 04/17/22 0953    Specimen: Nasopharyngeal Swab from Nasopharynx Updated: 04/17/22 1049     SARS-CoV-2 (COVID-19) Negative     Influenza A Negative     Influenza B Negative      Respiratory Syncytial Virus Negative          Pathology Results     ** No results found for the last 720 hours. **          Echo:         Imaging:    Radiology Imaging    XR CHEST 1 VW    Narrative  CLINICAL HISTORY:  Weakness    COMMENT:    Views: Single frontal portable..    Comparison date: none.    The heart and mediastinal contours are within top normal limits.  The trachea is  midline.  The lungs are clear without discrete infiltrate.  There  are no  pleural effusions.  The osseous structures are intact with mild degenerative  change.        --    Impression  Top normal heart size    CT A/P IMPRESSION:     1.   Findings consistent with bilateral ureteritis with right greater than left urothelial enhancement and periureteral fat stranding.   2.  Nonspecific retroperitoneal fat stranding surrounding the abdominal aorta. Although this could be reactive to the aforementioned findings of ureteritis, the possibility of inflammatory aortitis should also be considered.  Follow-up CT is recommended in 4 weeks, following empiric therapy to ensure resolution of this finding.   3.  Borderline splenomegaly      Assessment     Sepsis without shock (  Fever, tachycardia, tachypnea, leukocytosis)  -  source  - blood c/s pending    Ureteritis / UTI- CT reviewed  - urine c/s negative so far  - not on antibiotics PTA  - T max 101.3    Leukocytosis with bandemia- secondary to above    Labial lesions- does not appear vesicular or chancrous at this time, denies any history of, no drainage for culture analysis, patient agreeable to HIV testing    Allergy to PCN caused a rash however she has taken amoxicillin without reactions        Plan     Rocephin pending further c/s analysis and response to interventions, HIV, RPR, hepatitis, GC ordered

## 2022-04-19 LAB
ANION GAP SERPL CALC-SCNC: 7 MEQ/L (ref 3–15)
BACTERIA UR CULT: NORMAL
BASOPHILS # BLD: 0 K/UL (ref 0.01–0.1)
BASOPHILS NFR BLD: 0 %
BUN SERPL-MCNC: 10 MG/DL (ref 8–20)
C TRACH RRNA SPEC QL NAA+PROBE: NEGATIVE
CALCIUM SERPL-MCNC: 6.7 MG/DL (ref 8.9–10.3)
CHLORIDE SERPL-SCNC: 105 MEQ/L (ref 98–109)
CO2 SERPL-SCNC: 22 MEQ/L (ref 22–32)
CREAT SERPL-MCNC: 0.7 MG/DL (ref 0.6–1.1)
DIFFERENTIAL METHOD BLD: ABNORMAL
EOSINOPHIL # BLD: 0 K/UL (ref 0.04–0.36)
EOSINOPHIL NFR BLD: 0 %
ERYTHROCYTE [DISTWIDTH] IN BLOOD BY AUTOMATED COUNT: 15.6 % (ref 11.7–14.4)
ERYTHROCYTE [DISTWIDTH] IN BLOOD BY AUTOMATED COUNT: 15.6 % (ref 11.7–14.4)
GFR SERPL CREATININE-BSD FRML MDRD: >60 ML/MIN/1.73M*2
GLUCOSE SERPL-MCNC: 100 MG/DL (ref 70–99)
HCT VFR BLDCO AUTO: 27.1 % (ref 35–45)
HCT VFR BLDCO AUTO: 27.1 % (ref 35–45)
HGB BLD-MCNC: 9 G/DL (ref 11.8–15.7)
HGB BLD-MCNC: 9 G/DL (ref 11.8–15.7)
HIV 1+2 AB+HIV1 P24 AG SERPL QL IA: NONREACTIVE
HYPOCHROMIA BLD QL SMEAR: ABNORMAL
LYMPHOCYTES # BLD: 0.51 K/UL (ref 1.2–3.5)
LYMPHOCYTES NFR BLD: 2 %
MAGNESIUM SERPL-MCNC: 1.9 MG/DL (ref 1.8–2.5)
MCH RBC QN AUTO: 27 PG (ref 28–33.2)
MCH RBC QN AUTO: 27 PG (ref 28–33.2)
MCHC RBC AUTO-ENTMCNC: 33.2 G/DL (ref 32.2–35.5)
MCHC RBC AUTO-ENTMCNC: 33.2 G/DL (ref 32.2–35.5)
MCV RBC AUTO: 81.4 FL (ref 83–98)
MCV RBC AUTO: 81.4 FL (ref 83–98)
MICROCYTES BLD QL SMEAR: ABNORMAL
MONOCYTES # BLD: 0 K/UL (ref 0.28–0.8)
MONOCYTES NFR BLD: 0 %
N GONORRHOEA RRNA SPEC QL NAA+PROBE: NEGATIVE
NEUTS BAND # BLD: 21.46 K/UL (ref 1.7–7)
NEUTS BAND # BLD: 3.58 K/UL (ref 0–0.53)
NEUTS BAND NFR BLD: 14 %
NEUTS SEG NFR BLD: 84 %
PDW BLD AUTO: 9.8 FL (ref 9.4–12.3)
PDW BLD AUTO: 9.8 FL (ref 9.4–12.3)
PLAT MORPH BLD: NORMAL
PLATELET # BLD AUTO: 220 K/UL (ref 150–369)
PLATELET # BLD AUTO: 220 K/UL (ref 150–369)
PLATELET # BLD EST: ABNORMAL 10*3/UL
POTASSIUM SERPL-SCNC: 3.1 MEQ/L (ref 3.6–5.1)
RBC # BLD AUTO: 3.33 M/UL (ref 3.93–5.22)
RBC # BLD AUTO: 3.33 M/UL (ref 3.93–5.22)
RPR SER QL: NORMAL
SODIUM SERPL-SCNC: 134 MEQ/L (ref 136–144)
TOXIC GRANULES BLD QL SMEAR: ABNORMAL
WBC # BLD AUTO: 25.55 K/UL (ref 3.8–10.5)
WBC # BLD AUTO: 25.55 K/UL (ref 3.8–10.5)

## 2022-04-19 PROCEDURE — 63600000 HC DRUGS/DETAIL CODE: Performed by: HOSPITALIST

## 2022-04-19 PROCEDURE — 63700000 HC SELF-ADMINISTRABLE DRUG: Performed by: HOSPITALIST

## 2022-04-19 PROCEDURE — 87206 SMEAR FLUORESCENT/ACID STAI: CPT | Performed by: OBSTETRICS & GYNECOLOGY

## 2022-04-19 PROCEDURE — 99233 SBSQ HOSP IP/OBS HIGH 50: CPT | Performed by: HOSPITALIST

## 2022-04-19 PROCEDURE — 87101 SKIN FUNGI CULTURE: CPT | Performed by: OBSTETRICS & GYNECOLOGY

## 2022-04-19 PROCEDURE — 25800000 HC PHARMACY IV SOLUTIONS: Performed by: HOSPITALIST

## 2022-04-19 PROCEDURE — 85025 COMPLETE CBC W/AUTO DIFF WBC: CPT | Performed by: HOSPITALIST

## 2022-04-19 PROCEDURE — 85027 COMPLETE CBC AUTOMATED: CPT | Performed by: HOSPITALIST

## 2022-04-19 PROCEDURE — 63600000 HC DRUGS/DETAIL CODE: Performed by: NURSE PRACTITIONER

## 2022-04-19 PROCEDURE — 20600000 HC ROOM AND CARE INTERMEDIATE/TELEMETRY

## 2022-04-19 PROCEDURE — 80048 BASIC METABOLIC PNL TOTAL CA: CPT | Performed by: HOSPITALIST

## 2022-04-19 PROCEDURE — 36415 COLL VENOUS BLD VENIPUNCTURE: CPT | Performed by: HOSPITALIST

## 2022-04-19 PROCEDURE — 25800000 HC PHARMACY IV SOLUTIONS: Performed by: NURSE PRACTITIONER

## 2022-04-19 PROCEDURE — 83735 ASSAY OF MAGNESIUM: CPT | Performed by: HOSPITALIST

## 2022-04-19 PROCEDURE — 25000000 HC PHARMACY GENERAL: Performed by: NURSE PRACTITIONER

## 2022-04-19 RX ORDER — VANCOMYCIN HYDROCHLORIDE
1250
Status: DISCONTINUED | OUTPATIENT
Start: 2022-04-19 | End: 2022-04-20

## 2022-04-19 RX ORDER — LANOLIN ALCOHOL/MO/W.PET/CERES
400 CREAM (GRAM) TOPICAL ONCE
Status: COMPLETED | OUTPATIENT
Start: 2022-04-19 | End: 2022-04-19

## 2022-04-19 RX ORDER — MORPHINE SULFATE 2 MG/ML
1 INJECTION, SOLUTION INTRAMUSCULAR; INTRAVENOUS
Status: DISCONTINUED | OUTPATIENT
Start: 2022-04-19 | End: 2022-04-22 | Stop reason: HOSPADM

## 2022-04-19 RX ORDER — POTASSIUM CHLORIDE 14.9 MG/ML
20 INJECTION INTRAVENOUS ONCE
Status: COMPLETED | OUTPATIENT
Start: 2022-04-19 | End: 2022-04-19

## 2022-04-19 RX ADMIN — MORPHINE SULFATE 1 MG: 2 INJECTION, SOLUTION INTRAMUSCULAR; INTRAVENOUS at 12:04

## 2022-04-19 RX ADMIN — KETOROLAC TROMETHAMINE 15 MG: 15 INJECTION, SOLUTION INTRAMUSCULAR; INTRAVENOUS at 10:17

## 2022-04-19 RX ADMIN — SODIUM CHLORIDE: 9 INJECTION, SOLUTION INTRAVENOUS at 11:54

## 2022-04-19 RX ADMIN — SODIUM CHLORIDE: 9 INJECTION, SOLUTION INTRAVENOUS at 19:47

## 2022-04-19 RX ADMIN — POTASSIUM CHLORIDE 20 MEQ: 200 INJECTION, SOLUTION INTRAVENOUS at 08:12

## 2022-04-19 RX ADMIN — MAGNESIUM OXIDE TAB 400 MG (241.3 MG ELEMENTAL MG) 400 MG: 400 (241.3 MG) TAB at 08:12

## 2022-04-19 RX ADMIN — KETOROLAC TROMETHAMINE 15 MG: 15 INJECTION, SOLUTION INTRAMUSCULAR; INTRAVENOUS at 05:36

## 2022-04-19 RX ADMIN — WATER 1250 MG: 1000 INJECTION, SOLUTION INTRAVENOUS at 22:01

## 2022-04-19 RX ADMIN — POTASSIUM CHLORIDE 20 MEQ: 200 INJECTION, SOLUTION INTRAVENOUS at 10:19

## 2022-04-19 RX ADMIN — CEFTRIAXONE SODIUM 1 G: 1 INJECTION, POWDER, FOR SOLUTION INTRAMUSCULAR; INTRAVENOUS at 13:13

## 2022-04-19 RX ADMIN — SODIUM CHLORIDE: 9 INJECTION, SOLUTION INTRAVENOUS at 05:37

## 2022-04-19 RX ADMIN — HEPARIN SODIUM 5000 UNITS: 5000 INJECTION, SOLUTION INTRAVENOUS; SUBCUTANEOUS at 05:36

## 2022-04-19 RX ADMIN — MORPHINE SULFATE 1 MG: 2 INJECTION, SOLUTION INTRAMUSCULAR; INTRAVENOUS at 19:49

## 2022-04-19 RX ADMIN — HEPARIN SODIUM 5000 UNITS: 5000 INJECTION, SOLUTION INTRAVENOUS; SUBCUTANEOUS at 21:39

## 2022-04-19 RX ADMIN — KETOROLAC TROMETHAMINE 15 MG: 15 INJECTION, SOLUTION INTRAMUSCULAR; INTRAVENOUS at 16:09

## 2022-04-19 RX ADMIN — ACETAMINOPHEN 650 MG: 325 TABLET ORAL at 21:39

## 2022-04-19 RX ADMIN — HEPARIN SODIUM 5000 UNITS: 5000 INJECTION, SOLUTION INTRAVENOUS; SUBCUTANEOUS at 13:13

## 2022-04-19 NOTE — PLAN OF CARE
Plan of Care Review  Plan of Care Reviewed With: patient  Progress: no change  Outcome Summary: Pt continues to have fevers.  t [id number]. Toradol given for pain and fever.  Continue IV fluids and IV antibiotics.  Pt requesting to get out of bed to chair today

## 2022-04-19 NOTE — PROGRESS NOTES
"    Hospital Medicine Service -  Daily Progress Note       SUBJECTIVE   Interval History: Still with some lower abd tenderness, but says she feels \"a little better today\", fever last night noted     OBJECTIVE      Vital signs in last 24 hours:  Temp:  [36.8 °C (98.2 °F)-38.8 °C (101.8 °F)] 37.7 °C (99.8 °F)  Heart Rate:  [100-120] 100  Resp:  [31-46] 34  BP: (121-139)/(65-75) 129/75    Intake/Output Summary (Last 24 hours) at 4/19/2022 1037  Last data filed at 4/19/2022 1018  Gross per 24 hour   Intake 5499.5 ml   Output 1950 ml   Net 3549.5 ml       PHYSICAL EXAMINATION      Physical Exam    General:    Looks ill and tired   Head:    Normocephalic, atraumatic   Eyes:     EOMI BL                Lungs:     respirations unlabored   Heart:    RRR,    Abdomen:     Soft, mild lower abd ttp   Extremities:  Musculoskeletal:    no cyanosis or edema    moves all limbs                     Neurologic:  Behavior/Emotional:   AAOx3. Grossly nonfocal    Appropriate, odd affect        LINES, CATHETERS, DRAINS, AIRWAYS, AND WOUNDS   Lines, Drains, and Airways:  Wounds (agree with documentation and present on admission):  Peripheral IV (Adult) 04/17/22 Right Antecubital (Active)   Number of days: 2       Peripheral IV (Adult) 04/18/22 Posterior;Right Forearm (Active)   Number of days: 1         Comments:      LABS / IMAGING / TELE      Labs  I have reviewed the patient's pertinent labs.  Significant abnormals are wbc 25.55.    SARS-CoV-2 (COVID-19) (no units)   Date/Time Value   04/17/2022 0953 Negative       Imaging  I have independently reviewed the pertinent imaging from the last 24 hrs.    ECG/Telemetry  I have independently reviewed the telemetry. No events for the last 24 hours.    ASSESSMENT AND PLAN      Hypokalemia  Assessment & Plan  k 3.1, will repleat and monitor    Hyponatremia  Assessment & Plan  Improving, cont IV fluids, was likely dry    Dehydration  Assessment & Plan  Low PO intake PTA with hyponatremia  Cont " "supportive IV fluids  Encourage PO intake    Sepsis, due to unspecified organism, unspecified whether acute organ dysfunction present (CMS/[facility])  Assessment & Plan  ON admission: Septic shock-fever-symptoms ongoing for 4 days with pain in bilateral flank-dysuria  CT Findings consistent with bilateral ureteritis with right greater than left   urothelial enhancement and periureteral fat stranding.   2.  Nonspecific retroperitoneal fat stranding surrounding the abdominal aorta.   Although this could be reactive to the aforementioned findings of ureteritis,   the possibility of inflammatory aortitis should also be considered.  Follow-up   CT is recommended in 4 weeks, following empiric therapy to ensure resolution of   this finding. ----> to be done as OP likely    Overall stable, sitting in her chair this am, says she is feeling \"a little better\"  Overall likely  source  Urology consulted, watch for retention  ID following, Cultures pending, cont antibiotics as outlined           VTE Assessment: Padua VTE Score: 3  VTE Prophylaxis:  Current anticoagulants:  heparin (porcine) 5,000 unit/mL injection 5,000 Units, subcutaneous, q8h ALDEN      Code Status: Full Code  Palliative Care Screening Score: 0   Estimated Discharge Date: 4/21/2022     Disposition Planning: cont inpt mngt     Jarret Green MD  4/19/2022             "

## 2022-04-19 NOTE — PROGRESS NOTES
Vancomycin Dosing by Pharmacy Consult Initiated    Christa Willett is a 52 y.o. female who has been consulted for vancomycin dosing for bacterial skin and skin structure infection prescribed by CHRISTIANO VICTOR .    Reviewed relevant clinical data including weight, renal function, previous vancomycin doses, and vancomycin levels:  Creatinine   Date/Time Value Ref Range Status   04/19/2022 0547 0.7 0.6 - 1.1 mg/dL Final   04/18/2022 0800 0.6 0.6 - 1.1 mg/dL Final   04/17/2022 0952 1.0 0.6 - 1.1 mg/dL Final           Vancomycin Administrations (last 96 hours)       Date/Time Action Medication Dose Rate    04/18/22 1118 New Bag    vancomycin 1 g/250 mL IVPB in NSS 1,000 mg 166.7 mL/hr    04/18/22 0002 New Bag    vancomycin 1 g/250 mL IVPB in NSS 1,000 mg 166.7 mL/hr    04/17/22 1413 New Bag    vancomycin 750 mg/250 mL IVPB in  mg 250 mL/hr    04/17/22 1158 New Bag    vancomycin 1 g/250 mL IVPB in NSS 1 g 166.7 mL/hr            Assessment/Plan  The patient is ordered vancomycin dosing by pharmacy.    The dose will be based on:         Wt Readings from Last 1 Encounters:   04/18/22 83 kg (183 lb)         Estimated Creatinine Clearance: 90.8 mL/min by (C-G formula)      Will initiate a maintenance dose of 1250 mg IV every 12 hours.    Target a trough of 10-15 ug/mL per vancomycin dosing per pharmacy order.  Pharmacy will continue to follow the patient's vancomycin dosing daily.      Please call vancomycin levels to the pharmacy.  Kaushal Morgan [facility]

## 2022-04-19 NOTE — CONSULTS
"Gynecology Consult Note    Subjective     Christa Willett is a 52 y.o. female who was admitted for Ureteritis [N28.89]  Sepsis, due to unspecified organism, unspecified whether acute organ dysfunction present (CMS/[facility]) [A41.9].   Patient was seen in consultation at the request of referring physician for management recommendations.     Patient is a 53yo  female who was admitted with sepsis.  She was also found to have vaginal ulcers.  Pt states she was well until 5 days ago, when she began having dysuria.  Her period also started ten.  She also reports painful sores in her mouth, states everything started at the same time.  Symptoms then escalated to fever and chills and back pain, malaise which prompted her to go to the E.R. 2 days ago.      Pt has regular Gyn care in [place].  She recently relocated to the [facility] for a job at [employer].   She denies any h/o STD's or abnormal  Paps.  She denies any sexual activity for over a year.   Periods are regular every month.  Se denies abnormal vaginal discharge.  She does shave/clip the genital area and at times will \"abram\" and ingrown hair.  She states she has not done this recently. She denies exposure to any new irritants, except a different soap 3 weeks ago.      .    OB History:   OB History    Para Term  AB Living   4 3 3 0 1 3   SAB IAB Ectopic Multiple Live Births   1 0 0 0 3      # Outcome Date GA Lbr Gigi/2nd Weight Sex Delivery Anes PTL Lv   4 Term     M Vag-Spont   DE   3 Term     F Vag-Spont  Y DE   2 SAB  20w0d       FD   1 Term     M Vag-Spont   DE       Medical History: History reviewed. No pertinent past medical history.    Surgical History:   Past Surgical History:   Procedure Laterality Date   • APPENDECTOMY      1wk pospartum has ruptured appendix, and had right oophorectomy also   • POSTPARTUM TUBAL LIGATION     • RIGHT OOPHORECTOMY Right     at same time as appendectomy   • TONSILLECTOMY & ADENOIDECTOMY   "       Social History:   Social History     Socioeconomic History   • Marital status:      Spouse name: None   • Number of children: None   • Years of education: None   • Highest education level: None   Occupational History   • Occupation: Director of career servies at [employer]   Tobacco Use   • Smoking status: Never Smoker   Substance and Sexual Activity   • Alcohol use: Never   • Drug use: Never   • Sexual activity: Not Currently     Social Determinants of Health     Food Insecurity: No Food Insecurity   • Worried About Running Out of Food in the Last Year: Never true   • Ran Out of Food in the Last Year: Never true       Family History:   Family History   Problem Relation Age of Onset   • Cancer Biological Father        Allergies: Red dye, Latex, and Penicillins      Current Facility-Administered Medications:   •  acetaminophen (TYLENOL) tablet 650 mg, 650 mg, oral, q6h PRN, Olex, Derick A, DO, 650 mg at 04/18/22 1549  •  calcium carbonate (TUMS) chewable tablet 500 mg, 500 mg, oral, 3x daily PRN, Olex, Derick A, DO  •  cefTRIAXone (ROCEPHIN) IVPB 1 g in 100 mL NSS vial in bag, 1 g, intravenous, q24h INT, Dee Mckeon CRNP, Stopped at 04/19/22 1343  •  glucose chewable tablet 16-32 g of dextrose, 16-32 g of dextrose, oral, PRN **OR** dextrose 40 % oral gel 15-30 g of dextrose, 15-30 g of dextrose, oral, PRN **OR** glucagon (GLUCAGEN) injection 1 mg, 1 mg, intramuscular, PRN **OR** dextrose in water injection 12.5 g, 25 mL, intravenous, PRN, Janes Santos MD  •  heparin (porcine) 5,000 unit/mL injection 5,000 Units, 5,000 Units, subcutaneous, q8h ALDEN, Janes Santos MD, 5,000 Units at 04/19/22 1313  •  ketorolac (TORADOL) injection 15 mg, 15 mg, intravenous, q6h PRN, Derick Donnelly A, DO, 15 mg at 04/19/22 1609  •  morphine injection 1 mg, 1 mg, intravenous, q3h PRN, Jarret Green MD, 1 mg at 04/19/22 1949  •  ondansetron (ZOFRAN) injection 4 mg, 4 mg, intravenous, q6h PRN, Derick Donnelly  A, DO, 4 mg at 22  •  sodium chloride 0.9 % infusion, , intravenous, Continuous, Jarret Green MD, Last Rate: 150 mL/hr at 22, New Bag at 22  •  vancomycin 1.25 gram/250 mL IVPB in NSS, 1,250 mg, intravenous, q12h INT **AND** [] IV Vancomycin Therapy by Pharmacy Protocol, , , Once, Linda, KAYLEIGH Eagle    Review of Systems:  Pertinent items are noted in HPI.    Objective   Labs:  HIV-neg  RPR-neg  Hep B, C-neg  BMP: 134/3.1-105/22-10/0.7<100  CBC: 25.55>9.0/27.1<220K; 14 bands  GC/CT-neg  Covid 19-neg  Influenza A, B-neg  RSV-neg  Blood and urine cultures NGTD    Imaging/Ultrasounds:   Narrative & Impression   CLINICAL HISTORY:     Flank pain     COMPARISON:    None        COMMENT:  Technique: CT of the Abdomen and Pelvis was performed with IV contrast:  100mL  of iohexoL (OMNIPAQUE 350) 350 mg iodine/mL solution 100 mL  Oral contrast was not administered.  CT DOSE:  One or more dose reduction techniques (e.g. automated exposure  control, adjustment of the mA and/or kV according to patient size, use of  iterative reconstruction technique) utilized for this examination.           COMMENT:     LOWER CHEST:     Lower lungs: Within normal limits.  Visualized heart: Within normal limits.  Distal esophagus:  Normal caliber.     ABDOMEN     Liver: within normal limits.  Bile ducts: Normal caliber.  Gallbladder: No calcified gallstones.  Normal caliber wall.  Pancreas: Within normal limits.  Spleen: Top normal size, measuring 14.3 cm.  Adrenals: within normal limits.  Kidneys: There is bilateral urothelial enhancement involving the renal pelvises  and proximal ureters.  Periureteral fat stranding is noted on the right along  the course of the proximal to mid ureter.  Right greater than left ureteral wall  thickening is present.  There are no obstructing calculi identified.  Delayed imaging reveals normal proximal ureteral opacification.     PELVIS:  Reproductive organs:  Uterine fibroids are noted.  Left ovarian cyst measuring  3.1 cm.  Bladder: within normal limits.     Peritoneum: No free air.  No ascites.  Retroperitoneal fat stranding is noted surrounding the aorta and IVC.     Bowel: Mild retained colonic stool.  Normal bowel caliber.  Vessels:  Scattered atherosclerotic calcifications.  Periaortic fat stranding is  noted surrounding the abdominal aorta and proximal iliac vessels.  Lymph Nodes:  No enlarged nodes  Abdominal wall: within normal limits.  Bones: within normal limits.        --  IMPRESSION:     1.   Findings consistent with bilateral ureteritis with right greater than left  urothelial enhancement and periureteral fat stranding.  2.  Nonspecific retroperitoneal fat stranding surrounding the abdominal aorta.  Although this could be reactive to the aforementioned findings of ureteritis,  the possibility of inflammatory aortitis should also be considered.  Follow-up  CT is recommended in 4 weeks, following empiric therapy to ensure resolution of  this finding.  3.  Borderline splenomegaly           Exam:  General Appearance: Alert, cooperative, no acute distress  Mouth: tender ulcerated lesions posterolateral mucosa  Lungs: Clear to auscultation bilaterally, respirations unlabored  Heart: Regular rate and rhythm  Back: +diffuse tenderness  Abdomen: soft, nontender, no masses; no tender inguinal adenopathy palpated  Genitalia: Large tender ulcerations, with exudate at fourchette and periurethra                    +small menstrual bleeding  Extremities: no edema or calf tenderness  Neurologic: grossly intact without focal deficits      Assessment   Christa Willett is a 52 y.o. female admitted with ureteritis, sepsis with vaginal and oral ulcerations, being consulted for management recommendations       Plan     1.  Ulceration - pt gives no prior history.  Swab vials requested from lab specifically to test for herpes and yeast.  Two vials provided and swabs taken for HSV and  fungus.  Oral lesions also present, consider Chancroid, Behcet's, or other autoimmune etiology.   HIV and RPR were negative.  Local care advised.  2. Fibroid uterus - noted; pt states was unaware; periods regular, not excessive, no AUB.

## 2022-04-19 NOTE — PATIENT CARE CONFERENCE
Care Progression Rounds Note  Date: 4/19/2022  Time: 11:15 AM     Patient Name: Christa Willett     Medical Record Number: [id number]   YOB: 1970  Sex: Female      Room/Bed: 2509    Admitting Diagnosis: Ureteritis [N28.89]  Sepsis, due to unspecified organism, unspecified whether acute organ dysfunction present (CMS/[facility]) [A41.9]   Admit Date/Time: 4/17/2022  9:42 AM    Primary Diagnosis: No Principal Problem: There is no principal problem currently on the Problem List. Please update the Problem List and refresh.  Principal Problem: No Principal Problem: There is no principal problem currently on the Problem List. Please update the Problem List and refresh.    GMLOS: pending  Anticipated Discharge Date: 4/22/2022    AM-PAC:  Mobility Score:      Discharge Planning:  Concerns to be Addressed: denies needs/concerns at this time, no discharge needs identified  Anticipated Discharge Disposition: home without assistance or services    Barriers to Discharge:  Medical issues not resolved, Consult pending    Comments:  fevers, sepsis, BC repeat, IV Abt, Urine cult, flank pain, pain meds, OOB today, IV fluids, tachy, 2L    Participants:  social work/services, nursing, , pharmacy

## 2022-04-19 NOTE — PLAN OF CARE
Plan of Care Review  Progress: no change  Outcome Summary: Pt oob ,low grade fever,encourage orally ,continue IVF ,

## 2022-04-19 NOTE — PROGRESS NOTES
Infectious Disease Progress Note    Patient Name: Christa Willett  MR#: [id number]  : 1970  Admission Date: 2022  Date: 22   Time: 10:28 AM   Author: KAYLEIGH Ness    Major Events:     Antibiotics:    Anti-infectives (From admission, onward)    Start     Dose/Rate Route Frequency Ordered Stop    22 1315  cefTRIAXone (ROCEPHIN) IVPB 1 g in 100 mL NSS vial in bag         1 g  200 mL/hr over 30 Minutes intravenous Every 24 hours interval 22 1223            Subjective   Patient states she is still having back pain but the spasms are not as bad today, OOB in the chair, tolerating diet, denies any abdominal pain, does admit to dysuria    Review of Systems    Pertinent items are noted in HPI.    Objective     Vital Signs:    Patient Vitals for the past 72 hrs:   BP Temp Temp src Pulse Resp SpO2 Height Weight   22 0800 129/75 37.7 °C (99.8 °F) Oral 100 (!) 34 96 % -- --   22 0737 -- -- -- (!) 103 -- -- -- --   22 0600 123/68 37.6 °C (99.6 °F) Oral (!) 109 (!) 31 93 % -- --   22 0500 -- 37.8 °C (100.1 °F) -- -- -- -- -- --   22 0400 132/72 -- -- (!) 110 -- 93 % -- --   22 0200 125/71 -- -- (!) 108 (!) 35 95 % -- --   22 0100 -- 37.9 °C (100.2 °F) Oral -- -- -- -- --   22 0000 121/65 -- -- (!) 112 (!) 31 96 % -- --   22 2329 -- (!) 38.8 °C (101.8 °F) Oral -- -- -- -- --   22 2200 128/68 -- -- (!) 112 (!) 37 (!) 91 % -- --   22 2000 134/69 36.8 °C (98.2 °F) Oral (!) 103 (!) 40 93 % -- --   22 1600 134/74 37.9 °C (100.2 °F) Oral (!) 118 (!) 38 95 % -- --   22 1549 -- 38 °C (100.4 °F) -- -- -- -- -- --   22 1532 -- -- -- (!) 120 -- -- -- --   22 1400 121/70 -- -- (!) 116 (!) 38 (!) 90 % -- --   22 1200 139/69 37.6 °C (99.6 °F) Oral (!) 111 (!) 46 93 % -- --   22 1000 119/69 -- -- (!) 101 (!) 28 93 % -- --   22 0800 112/73 36.8 °C (98.2 °F) Oral (!) 101 (!) 29 97 % -- --   22  0721 -- 36.8 °C (98.2 °F) Oral (!) 101 (!) 27 95 % -- --   04/18/22 0652 -- 38 °C (100.4 °F) -- -- -- -- -- --   04/18/22 0600 114/70 37.7 °C (99.9 °F) Oral (!) 102 (!) 25 96 % -- --   04/18/22 0400 104/70 37.6 °C (99.6 °F) Oral (!) 103 (!) 24 99 % -- --   04/18/22 0200 115/72 -- -- (!) 109 (!) 36 92 % -- --   04/18/22 0005 -- -- -- -- -- -- 1.524 m (5') 83 kg (183 lb)   04/18/22 0000 127/78 (!) 38.5 °C (101.3 °F) Oral (!) 116 (!) 29 97 % -- --   04/17/22 2229 124/69 -- -- (!) 113 (!) 24 95 % -- --   04/17/22 2131 127/70 -- -- (!) 121 20 95 % -- --   04/17/22 2034 122/63 -- -- (!) 115 18 95 % -- --   04/17/22 1900 (!) 108/55 -- -- (!) 114 (!) 21 94 % -- --   04/17/22 1800 127/60 -- -- (!) 116 (!) 27 96 % -- --   04/17/22 1700 121/65 -- -- (!) 114 (!) 29 94 % -- --   04/17/22 1630 112/66 -- -- (!) 112 (!) 25 97 % -- --   04/17/22 1600 111/68 -- -- (!) 112 17 99 % -- --   04/17/22 1545 116/60 -- -- (!) 108 (!) 31 98 % -- --   04/17/22 1530 116/68 -- -- (!) 108 18 -- -- --   04/17/22 1516 (!) 104/59 -- -- (!) 106 (!) 23 99 % -- --   04/17/22 1515 -- 37.9 °C (100.3 °F) -- -- -- -- -- --   04/17/22 1500 119/74 -- -- (!) 103 (!) 28 98 % -- --   04/17/22 1445 103/77 -- -- (!) 102 (!) 22 94 % -- --   04/17/22 1430 124/89 -- -- 98 20 96 % -- --   04/17/22 1400 111/66 -- -- 94 20 97 % -- --   04/17/22 1346 (!) 103/55 -- -- 96 16 97 % -- --   04/17/22 1330 (!) 102/54 -- -- 95 13 100 % -- --   04/17/22 1315 113/69 -- -- 92 (!) 24 99 % -- --   04/17/22 1300 117/74 -- -- 90 20 99 % -- --   04/17/22 1245 98/64 -- -- 91 15 -- -- --   04/17/22 1231 (!) 88/54 -- -- 94 14 -- -- --   04/17/22 1220 (!) 84/56 -- -- 95 17 97 % -- --   04/17/22 1201 (!) 83/54 -- -- 96 (!) 26 97 % -- --   04/17/22 1201 -- 37.4 °C (99.3 °F) Oral -- -- -- -- --   04/17/22 1134 (!) 87/52 -- -- 99 14 96 % -- --   04/17/22 1100 (!) 112/58 -- -- (!) 102 (!) 26 -- -- --   04/17/22 1030 (!) 102/58 -- -- (!) 102 (!) 23 98 % -- --   04/17/22 1008 (!) 79/63 -- -- (!)  104 (!) 35 99 % -- --   22 0947 (!) 89/60 (!) 38.1 °C (100.5 °F) -- (!) 105 (!) 24 97 % 1.524 m (5') 74.4 kg (164 lb)       Temp (72hrs), Av.7 °C (99.9 °F), Min:36.8 °C (98.2 °F), Max:38.8 °C (101.8 °F)      Physical Exam:    Visit Vitals  /75 (BP Location: Left upper arm, Patient Position: Lying)   Pulse 100   Temp 37.7 °C (99.8 °F) (Oral)   Resp (!) 34   Ht 1.524 m (5')   Wt 83 kg (183 lb)   LMP 04/15/2022 (Exact Date)   SpO2 96%   BMI 35.74 kg/m²     General appearance: alert, appears stated age, cooperative, fatigued and no distress  Eyes: anicteric  Lungs: nonlabored, no cough observed  Abdomen: soft, ND, NT   Genital:  labia with more pustule like appearance today with induration and tenderness, no drainage, no vesicle or chancre  Neurologic: Mental status: Alert, oriented, thought content appropriate    Lines, Drains, Airways, Wounds:  Peripheral IV (Adult) 22 Right Antecubital (Active)   Number of days: 2       Peripheral IV (Adult) 22 Posterior;Right Forearm (Active)   Number of days: 1       Labs:    CBC Results       22     0547 0800 0952    WBC 25.55 20.06 21.09     25.55      RBC 3.33 3.41 4.09     3.33      HGB 9.0 9.1 11.0     9.0      HCT 27.1 27.7 32.7     27.1      MCV 81.4 81.2 80.0     81.4      MCH 27.0 26.7 26.9     27.0      MCHC 33.2 32.9 33.6     33.2       185 220     220        BMP Results       22     0547 0800 0952     134 128    K 3.1 3.2 3.8    Cl 105 105 94    CO2 22 23 25    Glucose 100 173 116    BUN 10 11 21    Creatinine 0.7 0.6 1.0    Calcium 6.7 6.8 8.4    Anion Gap 7 6 9    EGFR >60.0 >60.0 58.5         Comment for K at 0952 on 22: Results obtained on plasma. Plasma Potassium values may be up to 0.4 mEQ/L less than serum values. The differences may be greater for patients with high platelet or white cell counts.      PT/PTT Results    No lab values to display.     UA Results        04/17/22     1023    Color Yellow    Clarity Cloudy    Glucose Negative    Bilirubin Negative    Ketones Negative    Sp Grav 1.019    Blood +3    Ph 6.0    Protein +1    Urobilinogen 0.2    Nitrite Negative    Leuk Est +3    WBC Too Numerous To Count    RBC Too Numerous To Count    Bacteria Rare         Comment for Blood at 1023 on 04/17/22: The sensitivity of the occult blood test is equivalent to approximately 4 intact RBC/HPF.      Lactate Results       04/17/22 04/17/22     1603 0952    Lactate 1.0 1.7          Microbiology Results     Procedure Component Value Units Date/Time    Urine culture Urine, Clean Catch [[id number]]  (Normal) Collected: 04/17/22 1023    Specimen: Urine, Clean Catch Updated: 04/19/22 0846     Urine Culture No Growth (Limit of detection 1000 cfu/mL)    Blood Culture Blood, Venous [[id number]]  (Normal) Collected: 04/17/22 0955    Specimen: Blood, Venous Updated: 04/18/22 1301     Culture No growth at 18-24 hours    SARS-CoV-2 (COVID-19), PCR Nasopharynx [[id number]]  (Normal) Collected: 04/17/22 0953    Specimen: Nasopharyngeal Swab from Nasopharynx Updated: 04/17/22 1049    Narrative:      The following orders were created for panel order SARS-CoV-2 (COVID-19), PCR Nasopharynx.  Procedure                               Abnormality         Status                     ---------                               -----------         ------                     SARS-COV-2 (COVID-19)/ F...[[id number]]  Normal              Final result                 Please view results for these tests on the individual orders.    SARS-COV-2 (COVID-19)/ FLU A/B, AND RSV, PCR Nasopharynx [[id number]]  (Normal) Collected: 04/17/22 0953    Specimen: Nasopharyngeal Swab from Nasopharynx Updated: 04/17/22 1049     SARS-CoV-2 (COVID-19) Negative     Influenza A Negative     Influenza B Negative     Respiratory Syncytial Virus Negative    Blood Culture Blood, Venous [[id number]]  (Normal) Collected: 04/17/22 0952    Specimen:  Blood, Venous Updated: 04/18/22 1301     Culture No growth at 18-24 hours          Pathology Results     ** No results found for the last 720 hours. **          Echo:         Imaging:    Radiology Imaging    XR CHEST 1 VW    Narrative  CLINICAL HISTORY:  Weakness    COMMENT:    Views: Single frontal portable..    Comparison date: none.    The heart and mediastinal contours are within top normal limits.  The trachea is  midline.  The lungs are clear without discrete infiltrate.  There  are no  pleural effusions.  The osseous structures are intact with mild degenerative  change.        --    Impression  Top normal heart size      Assessment     Sepsis - improving  -  source  - blood c/s sterile so far     Ureteritis / UTI- CT reviewed  - urine c/s sterile, not on antibiotics PTA  - T max 101.8 around midnight, low grade since   - leukocytosis with bandemia persists     Labial lesions- does not appear vesicular or chancrous at this time, suspect folliculitis, she admits to shaving the area, HIV negative, hepatitis panel negative, RPR and GC pending     Allergy to PCN caused a rash however she tolerates amoxicillin and cephalosporins          Plan     On Rocephin and will add Vanco, warm moist compresses to the labia and monitor response to interventions, consider GYN evaluation

## 2022-04-19 NOTE — NURSING NOTE
1850hrs- Received message from lab needs another swab for HSV should be collect in viral Transport medium kit ,Notified to Doctor .

## 2022-04-19 NOTE — CONSULTS
Nutrition Assessment  Reason for consult: MST (was in error as recorded)    Recommendations:  1. Continue with Cardiac as tolerated  2. Weekly weights     Monitor:  1. %PO intake  2. Diet tolerance  3. Weight changes  4. Skin integrity  5.  Monitor lytes/labs, replete PRN  6. Bowel fx    Goals:   1. To consume and tolerate >/= 75% of estimated needs  2. Consider supplements if po intake <50%  3. Labs and electrolytes remain WDL    Subjective:  Discussed on PCU rounds, patient with fevers, sepsis, BC repeat, IV Abtx, Urine culture, positive for flank pain, pain meds, OOB today, IV fluids at 150 ml/hr, tachy, 2L NC. Last bm on 4/16. [id number]% of diet today. PO improving 100% today.         Clinical Course: Patient is a 52 y.o. female who was admitted on 4/17/2022 with a diagnosis of Ureteritis [N28.89]  Sepsis, due to unspecified organism, unspecified whether acute organ dysfunction present (CMS/[facility]) [A41.9]. Admitted on 4/17/2022 with B/L back pain for the past 6 days with progressive worsening and development of additional symptoms including fever, chills, weakness, dysuria, nausea, lethargy, arthralgias, and headache. Onadmission: Septic shock-fever-symptoms ongoing for 4 days with pain in bilateral flank, +dysuria. She also complains of painful pimple like sores on her labia. Low PO intake PTA with hyponatremia.    - 4/17 CT Findings consistent with bilateral ureteritis with right greater than left urothelial enhancement and periureteral fat stranding.   Nonspecific retroperitoneal fat stranding surrounding the abdominal aorta.     Labs 4/19 : Na 134, K+ 3.1, TBili 1.3, Ca 6.7L, Albumin 1.3 L  Skin intact, mentioned as generalized edema.    Nutrition Focused Physical Exam: obese based on BMI 35.74 Grade 2         Dietary Orders   (From admission, onward)             Start     Ordered    04/17/22 4706  Adult Diet Regular; Cardiac (2gm Sodium/Low Fat); RD/LDN may adjust order  Diet effective now        References:     IDDSI Diet reference   Question Answer Comment   Diet Texture Regular    Other Restriction(s): Cardiac (2gm Sodium/Low Fat)    Delegation of Authority. Diet orders written by PA/Candace may not be adjusted by RD/LDNs. RD/LDN may adjust order        04/17/22 2334              Wt Readings from Last 3 Encounters:   04/18/22 83 kg (183 lb)     Weights (last 7 days)     Date/Time Weight    04/18/22 0005 83 kg (183 lb)    04/17/22 0947 74.4 kg (164 lb)          History reviewed. No pertinent past medical history.  History reviewed. No pertinent surgical history.    Reason for Assessment  Reason For Assessment: identified at risk by screening criteria, other (see comments) (screening in error)  Diagnosis: infection/sepsis (Sepsis)    [facility] Nutrition Screen Tool  Has patient lost weight without trying?: 0-->No  If yes,how much weight has been lost?: 2-->Unsure  Has patient been eating poorly due to decreased appetite?: 0-->No  [facility] Nutrition Screen Score: 2    Nutrition/Diet History  Diet Prior to Admission: regular  Intake (%): 100%  Vitamin/Mineral/Herbal Supplements: multivitamin  Food Allergies: other (see comments) (red dye)    Physical Findings  Overall Physical Appearance: obese  Gastrointestinal:  (4/16 bm)  Last Bowel Movement: 04/16/22  Oral/Mouth Cavity:  (WDl)  Skin: intact, burns (generalized edema per nursing)    Last Bowel Movement: 04/16/22    Nutrition Order  Nutrition Order: meets nutritional requirements  Nutrition Order Comments: Cardiac    Anthropometrics  Height: 152.4 cm (5')         Current Weight  Weight Method: Bed scale  Weight: 83 kg (183 lb)    Ideal Body Weight (IBW)  Ideal Body Weight (IBW) (kg): 45.86  % Ideal Body Weight: 181    Usual Body Weight (UBW)  Usual Body Weight: 74.4 kg (164 lb) (stated on adm)  Weight Loss: no weight change    Body Mass Index (BMI)  BMI (Calculated): 35.7  BMI Assessment: BMI 35-39.9: obesity grade II                      Diagnostic Tests/Procedures  Diagnostic  Test/Procedure Reviewed: reviewed    Labs/Procedures/Meds  Lab Results Reviewed: reviewed  Lab Results Comments: Na 134, K+ 3.1, TBili 1.3, Ca 6.7L, Albumin 1.3 L      Results from last 7 days   Lab Units 04/19/22  0547 04/18/22  0800 04/17/22  0952   SODIUM mEQ/L 134* 134* 128*   POTASSIUM mEQ/L 3.1* 3.2* 3.8   CHLORIDE mEQ/L 105 105 94*   CO2 mEQ/L 22 23 25   BUN mg/dL 10 11 21*   CREATININE mg/dL 0.7 0.6 1.0   GLUCOSE mg/dL 100* 173* 116*   CALCIUM mg/dL 6.7* 6.8* 8.4*      Results from last 7 days   Lab Units 04/17/22  0952   ALK PHOS IU/L 93   BILIRUBIN TOTAL mg/dL 1.3*   ALBUMIN g/dL 3.0*   ALT IU/L 28   AST IU/L 24          No results found for: HGBA1C  No results found for: IMTGGSEI38  Lab Results   Component Value Date    CALCIUM 6.7 (L) 04/19/2022     Results from last 7 days   Lab Units 04/19/22  0547 04/18/22  0800 04/17/22  0952   WBC K/uL 25.55*  25.55* 20.06* 21.09*   HEMOGLOBIN g/dL 9.0*  9.0* 9.1* 11.0*   HEMATOCRIT % 27.1*  27.1* 27.7* 32.7*   PLATELETS K/uL 220  220 185 220               Results from last 7 days   Lab Units 04/19/22  0547 04/17/22  0952   MAGNESIUM mg/dL 1.9 1.8          Diagnostic Tests/Procedures  Diagnostic Test/Procedure Reviewed: reviewed    Medications  Pertinent Medications Reviewed: reviewed  Pertinent Medications Comments: Rocephin, Heparin, Vancomycin , TUMS TID, NSat 150ml/hr, Morphine, Toradol  • cefTRIAXone  1 g intravenous q24h INT   • heparin (porcine)  5,000 Units subcutaneous q8h [place]   • vancomycin  1,250 mg intravenous q12h INT     • sodium chloride 0.9 %   150 mL/hr at 04/19/22 1154               KCAL/KG  20 Kcal/Kg (kcal): 1660.16  25 Kcal/Kg (kcal): 2075.2  30 Kcal/Kg (kcal): 2490.24  35 Kcal/Kg (kcal): 2905.28  40 Kcal/Kg (kcal): 3320.32  45 Kcal/Kg (kcal): 3735.36  50 Kcal/Kg (kcal): 4150.4                     Fluid Requirements  [place]-Roseann Method (over 20 kg): 3160.16                                                                Nutritional Needs  Met?: Yes (100% recorded)  Nutrition Status Classification: Mild nutritional compromise (L1 Sepsis (4) (MST error -> no weight loss))      PES  Statement: PES Statement  Nutritional Needs Met?: Yes (100% recorded)                                                               Date: 04/19/22  Signature: Mackenzie Cat, RD, LDN

## 2022-04-20 ENCOUNTER — APPOINTMENT (INPATIENT)
Dept: RADIOLOGY | Facility: HOSPITAL | Age: 52
DRG: 872 | End: 2022-04-20
Attending: HOSPITALIST
Payer: COMMERCIAL

## 2022-04-20 LAB
ANION GAP SERPL CALC-SCNC: 7 MEQ/L (ref 3–15)
BASOPHILS # BLD: 0.05 K/UL (ref 0.01–0.1)
BASOPHILS NFR BLD: 0.3 %
BUN SERPL-MCNC: 8 MG/DL (ref 8–20)
CALCIUM SERPL-MCNC: 6.5 MG/DL (ref 8.9–10.3)
CHLORIDE SERPL-SCNC: 104 MEQ/L (ref 98–109)
CO2 SERPL-SCNC: 24 MEQ/L (ref 22–32)
CREAT SERPL-MCNC: 0.5 MG/DL (ref 0.6–1.1)
DIFFERENTIAL METHOD BLD: ABNORMAL
EOSINOPHIL # BLD: 0.04 K/UL (ref 0.04–0.36)
EOSINOPHIL NFR BLD: 0.2 %
ERYTHROCYTE [DISTWIDTH] IN BLOOD BY AUTOMATED COUNT: 15.5 % (ref 11.7–14.4)
FUNGUS STAIN: NORMAL
GFR SERPL CREATININE-BSD FRML MDRD: >60 ML/MIN/1.73M*2
GLUCOSE SERPL-MCNC: 99 MG/DL (ref 70–99)
HCT VFR BLDCO AUTO: 26.2 % (ref 35–45)
HGB BLD-MCNC: 8.6 G/DL (ref 11.8–15.7)
HSV1 DNA SPEC QL NAA+PROBE: NOT DETECTED
HSV2 DNA SPEC QL NAA+PROBE: NOT DETECTED
IMM GRANULOCYTES # BLD AUTO: 0.35 K/UL (ref 0–0.08)
IMM GRANULOCYTES NFR BLD AUTO: 1.8 %
LYMPHOCYTES # BLD: 1.56 K/UL (ref 1.2–3.5)
LYMPHOCYTES NFR BLD: 7.9 %
MAGNESIUM SERPL-MCNC: 2.1 MG/DL (ref 1.8–2.5)
MCH RBC QN AUTO: 27 PG (ref 28–33.2)
MCHC RBC AUTO-ENTMCNC: 32.8 G/DL (ref 32.2–35.5)
MCV RBC AUTO: 82.1 FL (ref 83–98)
MONOCYTES # BLD: 1.96 K/UL (ref 0.28–0.8)
MONOCYTES NFR BLD: 9.9 %
NEUTROPHILS # BLD: 15.8 K/UL (ref 1.7–7)
NEUTS SEG NFR BLD: 79.9 %
NRBC BLD-RTO: 0 %
PDW BLD AUTO: 9.6 FL (ref 9.4–12.3)
PLATELET # BLD AUTO: 214 K/UL (ref 150–369)
POTASSIUM SERPL-SCNC: 2.8 MEQ/L (ref 3.6–5.1)
RBC # BLD AUTO: 3.19 M/UL (ref 3.93–5.22)
SODIUM SERPL-SCNC: 135 MEQ/L (ref 136–144)
WBC # BLD AUTO: 19.76 K/UL (ref 3.8–10.5)

## 2022-04-20 PROCEDURE — 20600000 HC ROOM AND CARE INTERMEDIATE/TELEMETRY

## 2022-04-20 PROCEDURE — 63700000 HC SELF-ADMINISTRABLE DRUG: Performed by: HOSPITALIST

## 2022-04-20 PROCEDURE — 63600000 HC DRUGS/DETAIL CODE: Performed by: HOSPITALIST

## 2022-04-20 PROCEDURE — 63600000 HC DRUGS/DETAIL CODE: Performed by: NURSE PRACTITIONER

## 2022-04-20 PROCEDURE — 63700000 HC SELF-ADMINISTRABLE DRUG: Performed by: NURSE PRACTITIONER

## 2022-04-20 PROCEDURE — 87529 HSV DNA AMP PROBE: CPT | Performed by: OBSTETRICS & GYNECOLOGY

## 2022-04-20 PROCEDURE — 25800000 HC PHARMACY IV SOLUTIONS: Performed by: NURSE PRACTITIONER

## 2022-04-20 PROCEDURE — 80048 BASIC METABOLIC PNL TOTAL CA: CPT | Performed by: HOSPITALIST

## 2022-04-20 PROCEDURE — 25000000 HC PHARMACY GENERAL: Performed by: HOSPITALIST

## 2022-04-20 PROCEDURE — 71045 X-RAY EXAM CHEST 1 VIEW: CPT

## 2022-04-20 PROCEDURE — 36415 COLL VENOUS BLD VENIPUNCTURE: CPT | Performed by: HOSPITALIST

## 2022-04-20 PROCEDURE — 99233 SBSQ HOSP IP/OBS HIGH 50: CPT | Performed by: HOSPITALIST

## 2022-04-20 PROCEDURE — 85025 COMPLETE CBC W/AUTO DIFF WBC: CPT | Performed by: HOSPITALIST

## 2022-04-20 PROCEDURE — 25800000 HC PHARMACY IV SOLUTIONS: Performed by: HOSPITALIST

## 2022-04-20 PROCEDURE — 83735 ASSAY OF MAGNESIUM: CPT | Performed by: HOSPITALIST

## 2022-04-20 RX ORDER — DOXYCYCLINE HYCLATE 100 MG
100 TABLET ORAL EVERY 12 HOURS
Status: DISCONTINUED | OUTPATIENT
Start: 2022-04-20 | End: 2022-04-22 | Stop reason: HOSPADM

## 2022-04-20 RX ORDER — POTASSIUM CHLORIDE 14.9 MG/ML
20 INJECTION INTRAVENOUS ONCE
Status: COMPLETED | OUTPATIENT
Start: 2022-04-20 | End: 2022-04-20

## 2022-04-20 RX ORDER — IPRATROPIUM BROMIDE AND ALBUTEROL SULFATE 2.5; .5 MG/3ML; MG/3ML
3 SOLUTION RESPIRATORY (INHALATION) EVERY 8 HOURS PRN
Status: DISCONTINUED | OUTPATIENT
Start: 2022-04-20 | End: 2022-04-22 | Stop reason: HOSPADM

## 2022-04-20 RX ORDER — CALCIUM GLUCONATE 20 MG/ML
2 INJECTION, SOLUTION INTRAVENOUS ONCE
Status: COMPLETED | OUTPATIENT
Start: 2022-04-20 | End: 2022-04-20

## 2022-04-20 RX ADMIN — HEPARIN SODIUM 5000 UNITS: 5000 INJECTION, SOLUTION INTRAVENOUS; SUBCUTANEOUS at 06:37

## 2022-04-20 RX ADMIN — HEPARIN SODIUM 5000 UNITS: 5000 INJECTION, SOLUTION INTRAVENOUS; SUBCUTANEOUS at 21:06

## 2022-04-20 RX ADMIN — DOXYCYCLINE HYCLATE 100 MG: 100 TABLET, FILM COATED ORAL at 21:06

## 2022-04-20 RX ADMIN — KETOROLAC TROMETHAMINE 15 MG: 15 INJECTION, SOLUTION INTRAMUSCULAR; INTRAVENOUS at 03:41

## 2022-04-20 RX ADMIN — CALCIUM GLUCONATE 2 G: 20 INJECTION, SOLUTION INTRAVENOUS at 15:26

## 2022-04-20 RX ADMIN — POTASSIUM CHLORIDE 20 MEQ: 200 INJECTION, SOLUTION INTRAVENOUS at 09:38

## 2022-04-20 RX ADMIN — SODIUM CHLORIDE: 9 INJECTION, SOLUTION INTRAVENOUS at 03:41

## 2022-04-20 RX ADMIN — IPRATROPIUM BROMIDE AND ALBUTEROL SULFATE 3 ML: 2.5; .5 SOLUTION RESPIRATORY (INHALATION) at 06:51

## 2022-04-20 RX ADMIN — ACETAMINOPHEN 650 MG: 325 TABLET ORAL at 21:06

## 2022-04-20 RX ADMIN — HEPARIN SODIUM 5000 UNITS: 5000 INJECTION, SOLUTION INTRAVENOUS; SUBCUTANEOUS at 13:28

## 2022-04-20 RX ADMIN — VANCOMYCIN HYDROCHLORIDE 1000 MG: 1 INJECTION, POWDER, LYOPHILIZED, FOR SOLUTION INTRAVENOUS at 11:00

## 2022-04-20 RX ADMIN — KETOROLAC TROMETHAMINE 15 MG: 15 INJECTION, SOLUTION INTRAMUSCULAR; INTRAVENOUS at 17:57

## 2022-04-20 RX ADMIN — KETOROLAC TROMETHAMINE 15 MG: 15 INJECTION, SOLUTION INTRAMUSCULAR; INTRAVENOUS at 08:30

## 2022-04-20 RX ADMIN — POTASSIUM CHLORIDE 20 MEQ: 200 INJECTION, SOLUTION INTRAVENOUS at 11:30

## 2022-04-20 RX ADMIN — ACETAMINOPHEN 650 MG: 325 TABLET ORAL at 13:00

## 2022-04-20 NOTE — PROGRESS NOTES
"Infectious Disease Progress Note    Patient Name: Christa Willett  MR#: [id number]  : 1970  Admission Date: 2022  Date: 22   Time: 11:21 AM   Author: KAYLEIGH Ness    Major Events:     Antibiotics:    Anti-infectives (From admission, onward)    Start     Dose/Rate Route Frequency Ordered Stop    22 1015  vancomycin 1 g/250 mL IVPB in NSS        \"And\" Linked Group Details    1,000 mg  166.7 mL/hr over 90 Minutes intravenous Every 8 hours interval 22 1004      22 1315  cefTRIAXone (ROCEPHIN) IVPB 1 g in 100 mL NSS vial in bag         1 g  200 mL/hr over 30 Minutes intravenous Every 24 hours interval 22 1223            Subjective   Patient states she is feeling a little better today and the sores on her labia feel like that are improving, denies any vomiting or diarrhea, OOB in the chair, she admits to rash on her leg that does not itch but the spots are sore with palpation, d/w nursing     Review of Systems    Pertinent items are noted in HPI.    Objective     Vital Signs:    Patient Vitals for the past 72 hrs:   BP Temp Temp src Pulse Resp SpO2 Height Weight   22 0600 121/67 -- -- 99 (!) 30 96 % -- --   22 0400 (!) 116/57 36.8 °C (98.3 °F) Axillary 98 (!) 28 97 % -- --   22 0200 115/65 -- -- 97 (!) 26 98 % -- --   22 0000 115/73 36.7 °C (98 °F) Oral (!) 102 (!) 28 93 % -- 83.9 kg (185 lb)   22 2200 124/64 -- -- (!) 105 (!) 30 98 % -- --   22 2138 -- 37.9 °C (100.3 °F) -- -- -- -- -- --   22 2000 134/71 37.1 °C (98.8 °F) Oral (!) 107 (!) 28 (!) 90 % -- --   22 1800 120/74 37.7 °C (99.8 °F) Oral 98 20 95 % -- --   22 1600 128/76 37.8 °C (100.1 °F) Oral (!) 106 18 98 % -- --   22 1500 -- -- -- (!) 101 -- -- -- --   22 1400 135/74 37.7 °C (99.8 °F) Oral (!) 106 20 94 % -- --   22 1200 126/76 36.9 °C (98.4 °F) Oral 99 18 94 % -- --   22 1000 137/76 -- -- (!) 105 (!) 28 96 % -- --   22 " 0800 129/75 37.7 °C (99.8 °F) Oral 100 (!) 34 96 % -- --   04/19/22 0737 -- -- -- (!) 103 -- -- -- --   04/19/22 0600 123/68 37.6 °C (99.6 °F) Oral (!) 109 (!) 31 93 % -- --   04/19/22 0500 -- 37.8 °C (100.1 °F) -- -- -- -- -- --   04/19/22 0400 132/72 -- -- (!) 110 -- 93 % -- --   04/19/22 0200 125/71 -- -- (!) 108 (!) 35 95 % -- --   04/19/22 0100 -- 37.9 °C (100.2 °F) Oral -- -- -- -- --   04/19/22 0000 121/65 -- -- (!) 112 (!) 31 96 % -- --   04/18/22 2329 -- (!) 38.8 °C (101.8 °F) Oral -- -- -- -- --   04/18/22 2200 128/68 -- -- (!) 112 (!) 37 (!) 91 % -- --   04/18/22 2000 134/69 36.8 °C (98.2 °F) Oral (!) 103 (!) 40 93 % -- --   04/18/22 1600 134/74 37.9 °C (100.2 °F) Oral (!) 118 (!) 38 95 % -- --   04/18/22 1549 -- 38 °C (100.4 °F) -- -- -- -- -- --   04/18/22 1532 -- -- -- (!) 120 -- -- -- --   04/18/22 1400 121/70 -- -- (!) 116 (!) 38 (!) 90 % -- --   04/18/22 1200 139/69 37.6 °C (99.6 °F) Oral (!) 111 (!) 46 93 % -- --   04/18/22 1000 119/69 -- -- (!) 101 (!) 28 93 % -- --   04/18/22 0800 112/73 36.8 °C (98.2 °F) Oral (!) 101 (!) 29 97 % -- --   04/18/22 0721 -- 36.8 °C (98.2 °F) Oral (!) 101 (!) 27 95 % -- --   04/18/22 0652 -- 38 °C (100.4 °F) -- -- -- -- -- --   04/18/22 0600 114/70 37.7 °C (99.9 °F) Oral (!) 102 (!) 25 96 % -- --   04/18/22 0400 104/70 37.6 °C (99.6 °F) Oral (!) 103 (!) 24 99 % -- --   04/18/22 0200 115/72 -- -- (!) 109 (!) 36 92 % -- --   04/18/22 0005 -- -- -- -- -- -- 1.524 m (5') 83 kg (183 lb)   04/18/22 0000 127/78 (!) 38.5 °C (101.3 °F) Oral (!) 116 (!) 29 97 % -- --   04/17/22 2229 124/69 -- -- (!) 113 (!) 24 95 % -- --   04/17/22 2131 127/70 -- -- (!) 121 20 95 % -- --   04/17/22 2034 122/63 -- -- (!) 115 18 95 % -- --   04/17/22 1900 (!) 108/55 -- -- (!) 114 (!) 21 94 % -- --   04/17/22 1800 127/60 -- -- (!) 116 (!) 27 96 % -- --   04/17/22 1700 121/65 -- -- (!) 114 (!) 29 94 % -- --   04/17/22 1630 112/66 -- -- (!) 112 (!) 25 97 % -- --   04/17/22 1600 111/68 -- -- (!) 112  17 99 % -- --   22 1545 116/60 -- -- (!) 108 (!) 31 98 % -- --   22 1530 116/68 -- -- (!) 108 18 -- -- --   22 1516 (!) 104/59 -- -- (!) 106 (!) 23 99 % -- --   22 1515 -- 37.9 °C (100.3 °F) -- -- -- -- -- --   22 1500 119/74 -- -- (!) 103 (!) 28 98 % -- --   22 1445 103/77 -- -- (!) 102 (!) 22 94 % -- --   22 1430 124/89 -- -- 98 20 96 % -- --   22 1400 111/66 -- -- 94 20 97 % -- --   22 1346 (!) 103/55 -- -- 96 16 97 % -- --   22 1330 (!) 102/54 -- -- 95 13 100 % -- --   22 1315 113/69 -- -- 92 (!) 24 99 % -- --   22 1300 117/74 -- -- 90 20 99 % -- --   22 1245 98/64 -- -- 91 15 -- -- --   22 1231 (!) 88/54 -- -- 94 14 -- -- --   22 1220 (!) 84/56 -- -- 95 17 97 % -- --   22 1201 (!) 83/54 -- -- 96 (!) 26 97 % -- --   22 1201 -- 37.4 °C (99.3 °F) Oral -- -- -- -- --   22 1134 (!) 87/52 -- -- 99 14 96 % -- --       Temp (72hrs), Av.6 °C (99.6 °F), Min:36.7 °C (98 °F), Max:38.8 °C (101.8 °F)      Physical Exam:    Visit Vitals  /67   Pulse 99   Temp 36.8 °C (98.3 °F) (Axillary)   Resp (!) 30   Ht 1.524 m (5')   Wt 83.9 kg (185 lb)   LMP 04/15/2022 (Exact Date)   SpO2 96%   BMI 36.13 kg/m²     General appearance: alert, appears stated age, cooperative and no distress  Lungs: nonlabored, no cough observed  Abdomen: sof, ND, NT  Female genitalia: labia lesions resolving and new palpule at the mons pubis area with mild induration and tendenress   Skin: upper thighs with scattered erythematic papules, no induration, no drainage, mild tenderness   Neurologic: Mental status: Alert, oriented, thought content appropriate    Lines, Drains, Airways, Wounds:  Peripheral IV (Adult) 22 Right Antecubital (Active)   Number of days: 3       Peripheral IV (Adult) 22 Posterior;Right Forearm (Active)   Number of days: 2       Labs:    CBC Results       22     0639 0547 0800    WBC  19.76 25.55 20.06      25.55     RBC 3.19 3.33 3.41      3.33     HGB 8.6 9.0 9.1      9.0     HCT 26.2 27.1 27.7      27.1     MCV 82.1 81.4 81.2      81.4     MCH 27.0 27.0 26.7      27.0     MCHC 32.8 33.2 32.9      33.2      220 185      220       BMP Results       04/20/22 04/19/22 04/18/22     0639 0547 0800     134 134    K 2.8 3.1 3.2    Cl 104 105 105    CO2 24 22 23    Glucose 99 100 173    BUN 8 10 11    Creatinine 0.5 0.7 0.6    Calcium 6.5 6.7 6.8    Anion Gap 7 7 6    EGFR >60.0 >60.0 >60.0      PT/PTT Results    No lab values to display.     UA Results       04/17/22     1023    Color Yellow    Clarity Cloudy    Glucose Negative    Bilirubin Negative    Ketones Negative    Sp Grav 1.019    Blood +3    Ph 6.0    Protein +1    Urobilinogen 0.2    Nitrite Negative    Leuk Est +3    WBC Too Numerous To Count    RBC Too Numerous To Count    Bacteria Rare         Comment for Blood at 1023 on 04/17/22: The sensitivity of the occult blood test is equivalent to approximately 4 intact RBC/HPF.      Lactate Results       04/17/22 04/17/22     1603 0952    Lactate 1.0 1.7          Microbiology Results     Procedure Component Value Units Date/Time    Chlamydia/GC RNA:ThinPrep/Urine/Swab Urine, Clean Catch [[id number]]  (Normal) Collected: 04/18/22 1557    Specimen: Urine, Clean Catch Updated: 04/19/22 1601     Chlamydia Trachomatis rRNA Negative     Neisseria gonorrhoeae rRNA Negative    Urine culture Urine, Clean Catch [[id number]]  (Normal) Collected: 04/17/22 1023    Specimen: Urine, Clean Catch Updated: 04/19/22 0846     Urine Culture No Growth (Limit of detection 1000 cfu/mL)    Blood Culture Blood, Venous [[id number]]  (Normal) Collected: 04/17/22 0955    Specimen: Blood, Venous Updated: 04/19/22 1301     Culture No growth at 48 hours    SARS-CoV-2 (COVID-19), PCR Nasopharynx [[id number]]  (Normal) Collected: 04/17/22 0953    Specimen: Nasopharyngeal Swab from Nasopharynx Updated: 04/17/22 1049     Narrative:      The following orders were created for panel order SARS-CoV-2 (COVID-19), PCR Nasopharynx.  Procedure                               Abnormality         Status                     ---------                               -----------         ------                     SARS-COV-2 (COVID-19)/ F...[[id number]]  Normal              Final result                 Please view results for these tests on the individual orders.    SARS-COV-2 (COVID-19)/ FLU A/B, AND RSV, PCR Nasopharynx [[id number]]  (Normal) Collected: 04/17/22 0953    Specimen: Nasopharyngeal Swab from Nasopharynx Updated: 04/17/22 1049     SARS-CoV-2 (COVID-19) Negative     Influenza A Negative     Influenza B Negative     Respiratory Syncytial Virus Negative    Blood Culture Blood, Venous [[id number]]  (Normal) Collected: 04/17/22 0952    Specimen: Blood, Venous Updated: 04/19/22 1301     Culture No growth at 48 hours          Pathology Results     ** No results found for the last 720 hours. **          Echo:         Imaging:    Radiology Imaging    XR CHEST 1 VW    Narrative  CLINICAL HISTORY: SOB, pulmonary edema suspected    TECHNIQUE: Single AP view of the chest.    COMMENT:    Comparison made to 04/17/2022.    Cardiac leads overlie the chest.    Retrocardiac airspace opacity with suspected left pleural effusion. There is  airspace opacity in the right lower lobe. Findings are new since prior  examination concerning for pulmonary edema.    Cardiomediastinal silhouette and osseus structures are stable.    --    Impression  See comment.      Assessment      Rash/ ? Additional folliculitis versus reaction   - upper legs involved, not on arms or trunk  - denies any itching associated but does have some mild tenderness     Sepsis - improving  - blood c/s sterile so far     Ureteritis / UTI- CT reviewed  - urine c/s sterile, not on antibiotics PTA  - T max 100.3    - leukocytosis decreasing     Labial lesions-  suspect folliculitis, she admits to  shaving the area, HIV negative, hepatitis panel negative, RPR negative and GC negative  - GYN following, HIV 1&2 pending, fungal culture pending     Allergy to PCN caused a rash however she tolerates amoxicillin and cephalosporins          Plan     On Rocephin and Vanco, will discuss with Dr Silverio

## 2022-04-20 NOTE — PROGRESS NOTES
Vancomycin Dosing by Pharmacy Consult Follow up    Christa Willett is a 52 y.o. female who has been consulted for vancomycin dosing for bacterial skin and skin structure infection.    Reviewed relevant clinical data including weight, renal function, previous vancomycin doses, and vancomycin levels  Creatinine   Date/Time Value Ref Range Status   04/20/2022 0639 0.5 (L) 0.6 - 1.1 mg/dL Final   04/19/2022 0547 0.7 0.6 - 1.1 mg/dL Final   04/18/2022 0800 0.6 0.6 - 1.1 mg/dL Final     No results found for: VANCORANDOM, VANCOTROUGH, VANCOPEAK      Vancomycin Administrations (last 96 hours)       Date/Time Action Medication Dose Rate    04/19/22 2201 New Bag    vancomycin 1.25 gram/250 mL IVPB in NSS 1,250 mg 166.7 mL/hr    04/18/22 1118 New Bag    vancomycin 1 g/250 mL IVPB in NSS 1,000 mg 166.7 mL/hr    04/18/22 0002 New Bag    vancomycin 1 g/250 mL IVPB in NSS 1,000 mg 166.7 mL/hr    04/17/22 1413 New Bag    vancomycin 750 mg/250 mL IVPB in  mg 250 mL/hr    04/17/22 1158 New Bag    vancomycin 1 g/250 mL IVPB in NSS 1 g 166.7 mL/hr              Assessment/Plan  The patient is ordered vancomycin dosing by pharmacy.      The patient’s renal function; is improving      Will adjust dose to vancomycin 1000 mg IV Q 8 H .      Next trough:  4/21/22 @ 10:00    Pharmacy will continue to follow the patient’s vancomycin dosing daily during this course of therapy.      Please call vancomycin levels to the pharmacy.  Kaushal Morgan [facility]

## 2022-04-20 NOTE — PATIENT CARE CONFERENCE
Care Progression Rounds Note  Date: 4/20/2022  Time: 11:26 AM     Patient Name: Christa Willett     Medical Record Number: [id number]   YOB: 1970  Sex: Female      Room/Bed: 2509    Admitting Diagnosis: Ureteritis [N28.89]  Sepsis, due to unspecified organism, unspecified whether acute organ dysfunction present (CMS/[facility]) [A41.9]   Admit Date/Time: 4/17/2022  9:42 AM    Primary Diagnosis: No Principal Problem: There is no principal problem currently on the Problem List. Please update the Problem List and refresh.  Principal Problem: No Principal Problem: There is no principal problem currently on the Problem List. Please update the Problem List and refresh.    GMLOS: 3.5  Anticipated Discharge Date: 4/25/2022    AM-PAC:  Mobility Score:      Discharge Planning:  Concerns to be Addressed: denies needs/concerns at this time, no discharge needs identified  Anticipated Discharge Disposition: home without assistance or services    Barriers to Discharge:  Medical issues not resolved, Test pending    Comments:  swelling in vagina, fever, wheezing, chest x ray pending, sores in her mouth, IV abt    Participants:  social work/services, nursing, dietitian/nutrition services,

## 2022-04-20 NOTE — CONSULTS
"Gynecology Consult Note    Subjective     Patient is a 51yo  female who was admitted with sepsis.  She was also found to have vaginal ulcers.  She also reports painful sores in her mouth.  Today she states she feels a little better.  Yesterday swabs were taken for yeast and herpes, however, lab now says the incorrect vial was provided yesterday for the herpes, and will need to be repeated today.      Pt has regular Gyn care in [place].  She recently relocated to the [facility] for a job at [employer].   She denies any h/o STD's or abnormal  Paps.  She denies any sexual activity for over a year.   Periods are regular every month.  Se denies abnormal vaginal discharge.  She does shave/clip the genital area and at times will \"abram\" and ingrown hair.  She states she has not done this recently. She denies exposure to any new irritants, except a different soap 3 weeks ago.      .  Allergies: Red dye, Latex, and Penicillins      Current Facility-Administered Medications:   •  acetaminophen (TYLENOL) tablet 650 mg, 650 mg, oral, q6h PRN, Derick Donnelly, DO, 650 mg at 04/19/22 2139  •  calcium carbonate (TUMS) chewable tablet 500 mg, 500 mg, oral, 3x daily PRN, Derick Donnelly, DO  •  cefTRIAXone (ROCEPHIN) IVPB 1 g in 100 mL NSS vial in bag, 1 g, intravenous, q24h INTLinda Christina A, CRNP, Stopped at 04/19/22 1343  •  glucose chewable tablet 16-32 g of dextrose, 16-32 g of dextrose, oral, PRN **OR** dextrose 40 % oral gel 15-30 g of dextrose, 15-30 g of dextrose, oral, PRN **OR** glucagon (GLUCAGEN) injection 1 mg, 1 mg, intramuscular, PRN **OR** dextrose in water injection 12.5 g, 25 mL, intravenous, PRN, Janes Santos MD  •  heparin (porcine) 5,000 unit/mL injection 5,000 Units, 5,000 Units, subcutaneous, q8h [facility], Janes Santos MD, 5,000 Units at 04/20/22 0637  •  ipratropium-albuteroL (DUO-NEB) 0.5-2.5 mg/3 mL nebulizer solution 3 mL, 3 mL, nebulization, q8h PRN, Derick Donnelly DO, 3 mL at 04/20/22 0651  •  " ketorolac (TORADOL) injection 15 mg, 15 mg, intravenous, q6h PRN, Olex, Derick A, DO, 15 mg at 22 0830  •  morphine injection 1 mg, 1 mg, intravenous, q3h PRN, Jarret Green MD, 1 mg at 22  •  ondansetron (ZOFRAN) injection 4 mg, 4 mg, intravenous, q6h PRN, DwightxDerick A, DO, 4 mg at 22  •  potassium chloride 20 mEq in 100 mL IVPB  (premix), 20 mEq, intravenous, Once **FOLLOWED BY** potassium chloride 20 mEq in 100 mL IVPB  (premix), 20 mEq, intravenous, Once, Jarret Green MD  •  sodium chloride 0.9 % infusion, , intravenous, Continuous, Derick Donnelly A, DO, Last Rate: 80 mL/hr at 22 0646, Rate Change at 22 0646  •  vancomycin 1.25 gram/250 mL IVPB in NSS, 1,250 mg, intravenous, q12h INT, Stopped at 22 2331 **AND** [] IV Vancomycin Therapy by Pharmacy Protocol, , , Once, Dee Mckeon CRNP        Objective    Lzvu=360.3  GV=638/67  Pulse=99  Pox=96%    Labs:  HIV-neg  RPR-neg  Hep B, C-neg  BMP: 134/3.1-105/22-10/0.7<100  CBC: 25.55>9.0/27.1<220K; 14 bands  GC/CT-neg  Covid 19-neg  Influenza A, B-neg  RSV-neg  Blood and urine cultures NGTD    Imaging/Ultrasounds:   Narrative & Impression   CLINICAL HISTORY:     Flank pain     COMPARISON:    None        COMMENT:  Technique: CT of the Abdomen and Pelvis was performed with IV contrast:  100mL  of iohexoL (OMNIPAQUE 350) 350 mg iodine/mL solution 100 mL  Oral contrast was not administered.  CT DOSE:  One or more dose reduction techniques (e.g. automated exposure  control, adjustment of the mA and/or kV according to patient size, use of  iterative reconstruction technique) utilized for this examination.           COMMENT:     LOWER CHEST:     Lower lungs: Within normal limits.  Visualized heart: Within normal limits.  Distal esophagus:  Normal caliber.     ABDOMEN     Liver: within normal limits.  Bile ducts: Normal caliber.  Gallbladder: No calcified gallstones.  Normal caliber wall.  Pancreas:  Within normal limits.  Spleen: Top normal size, measuring 14.3 cm.  Adrenals: within normal limits.  Kidneys: There is bilateral urothelial enhancement involving the renal pelvises  and proximal ureters.  Periureteral fat stranding is noted on the right along  the course of the proximal to mid ureter.  Right greater than left ureteral wall  thickening is present.  There are no obstructing calculi identified.  Delayed imaging reveals normal proximal ureteral opacification.     PELVIS:  Reproductive organs: Uterine fibroids are noted.  Left ovarian cyst measuring  3.1 cm.  Bladder: within normal limits.     Peritoneum: No free air.  No ascites.  Retroperitoneal fat stranding is noted surrounding the aorta and IVC.     Bowel: Mild retained colonic stool.  Normal bowel caliber.  Vessels:  Scattered atherosclerotic calcifications.  Periaortic fat stranding is  noted surrounding the abdominal aorta and proximal iliac vessels.  Lymph Nodes:  No enlarged nodes  Abdominal wall: within normal limits.  Bones: within normal limits.        --  IMPRESSION:     1.   Findings consistent with bilateral ureteritis with right greater than left  urothelial enhancement and periureteral fat stranding.  2.  Nonspecific retroperitoneal fat stranding surrounding the abdominal aorta.  Although this could be reactive to the aforementioned findings of ureteritis,  the possibility of inflammatory aortitis should also be considered.  Follow-up  CT is recommended in 4 weeks, following empiric therapy to ensure resolution of  this finding.  3.  Borderline splenomegaly           Exam:  General Appearance: Alert, cooperative, no acute distress  Mouth: tender ulcerated lesions posterolateral mucosa  Lungs:  respirations unlabored  Abdomen: soft, nontender, no masses; no tender inguinal adenopathy palpated  Genitalia: Large tender ulcerations, with exudate at fourchette and periurethra                    +small menstrual bleeding      Assessment    Christa Willett is a 52 y.o. female admitted with ureteritis, sepsis with vaginal and oral ulcerations, being consulted for management recommendations       Plan     1.  Ulceration - pt gives no prior history.  Fungal swab is pending, herpes swab repeated today with proper viral transport vial.  Oral lesions also present, consider, Behcet's, or other autoimmune etiology.   HIV and RPR were negative.  Local care advised.  2. Fibroid uterus - noted; pt states was unaware; periods regular, not excessive, no AUB.

## 2022-04-20 NOTE — PROGRESS NOTES
Hospital Medicine Service -  Daily Progress Note       SUBJECTIVE   Interval History: Says she is feeling a little better today overall, temp to 100.3 noted last night     OBJECTIVE      Vital signs in last 24 hours:  Temp:  [36.7 °C (98 °F)-37.9 °C (100.3 °F)] 36.8 °C (98.3 °F)  Heart Rate:  [] 99  Resp:  [18-30] 30  BP: (115-137)/(57-76) 121/67    Intake/Output Summary (Last 24 hours) at 4/20/2022 0838  Last data filed at 4/20/2022 0646  Gross per 24 hour   Intake 5544.5 ml   Output 1950 ml   Net 3594.5 ml       PHYSICAL EXAMINATION      Physical Exam    General:    Looks ill and tired   Head:    Normocephalic, atraumatic   Eyes:     EOMI BL                Lungs:     respirations unlabored   Heart:    RRR,    Abdomen:     Soft, mild lower abd ttp   Extremities:  Musculoskeletal:    no cyanosis or edema    moves all limbs                     Neurologic:  Behavior/Emotional:   AAOx3. Grossly nonfocal    Appropriate, odd affect        LINES, CATHETERS, DRAINS, AIRWAYS, AND WOUNDS   Lines, Drains, and Airways:  Wounds (agree with documentation and present on admission):  Peripheral IV (Adult) 04/17/22 Right Antecubital (Active)   Number of days: 3       Peripheral IV (Adult) 04/18/22 Posterior;Right Forearm (Active)   Number of days: 2         Comments:      LABS / IMAGING / TELE      Labs  I have reviewed the patient's pertinent labs.  Significant abnormals are k 2.8.    SARS-CoV-2 (COVID-19) (no units)   Date/Time Value   04/17/2022 0953 Negative       Imaging  I have independently reviewed the pertinent imaging from the last 24 hrs.    ECG/Telemetry  I have independently reviewed the telemetry. No events for the last 24 hours.    ASSESSMENT AND PLAN      Hypokalemia  Assessment & Plan  k 2.8, will repleat and monitor    Hyponatremia  Assessment & Plan  Improving, d/c IV fluids, encourage PO intake    Dehydration  Assessment & Plan  Low PO intake PTA with hyponatremia  Cont supportive IV fluids  Encourage  PO intake    Sepsis, due to unspecified organism, unspecified whether acute organ dysfunction present (CMS/[facility])  Assessment & Plan  ON admission: Septic shock-fever-symptoms ongoing for 4 days with pain in bilateral flank-dysuria  CT Findings consistent with bilateral ureteritis with right greater than left   urothelial enhancement and periureteral fat stranding.   2.  Nonspecific retroperitoneal fat stranding surrounding the abdominal aorta.   Although this could be reactive to the aforementioned findings of ureteritis,   the possibility of inflammatory aortitis should also be considered.  Follow-up   CT is recommended in 4 weeks, following empiric therapy to ensure resolution of   this finding. ----> to be done as OP likely    Overall stable, wbc count improved today, temp to 100.3 last night noted  Overall likely  source  Urology consulted, watch for retention  ID following, Cultures pending, cont antibiotics as outlined           VTE Assessment: Padua VTE Score: 3  VTE Prophylaxis:  Current anticoagulants:  heparin (porcine) 5,000 unit/mL injection 5,000 Units, subcutaneous, q8h ALDEN      Code Status: Full Code  Palliative Care Screening Score: 0   Estimated Discharge Date: 4/22/2022     Disposition Planning: cont inpt mngt     Jarret Green MD  4/20/2022

## 2022-04-20 NOTE — PLAN OF CARE
Problem: Adult Inpatient Plan of Care  Goal: Plan of Care Review  Outcome: Progressing  Flowsheets (Taken 4/20/2022 1606)  Progress: improving  Plan of Care Reviewed With: patient  Outcome Summary:   Discussed in CPR, had Temp overnight   has vaginal swelling, sores in mouth   chest xray ordered   Urology and ID Consulted   met with patient at bedside   discussed dispo plan   declines any home health care needs   will have ride home at time of DC.     Problem: Adult Inpatient Plan of Care  Goal: Patient-Specific Goal (Individualized)  Flowsheets (Taken 4/20/2022 1606)  Patient-Specific Goals (Include Timeframe): Patient's goal is to be DC to home.

## 2022-04-20 NOTE — PLAN OF CARE
Plan of Care Review  Plan of Care Reviewed With: patient  Progress: no change  Outcome Summary: T max 100.3.  MEdicated for pain as indicated see EMAR.  Barrier cream applied to groin.  Continue IV fluids and IV antibiotics.  Assist with positioning for comfort

## 2022-04-20 NOTE — PLAN OF CARE
Plan of Care Review  Progress: improving  Outcome Summary: Alert,still with pain ,with low grade fever ,encourage fluids,IVF Stopped,Up in chair,

## 2022-04-20 NOTE — PROGRESS NOTES
Vancomycin Dosing by Pharmacy Consult Discontinued    IV Vancomycin Dosing by Pharmacy Protocol was discontinued.  Pharmacy will sign off and no longer dose vancomycin for this patient.    Fiona Johns, LynD

## 2022-04-21 ENCOUNTER — APPOINTMENT (INPATIENT)
Dept: CARDIOLOGY | Facility: HOSPITAL | Age: 52
DRG: 872 | End: 2022-04-21
Attending: HOSPITALIST
Payer: COMMERCIAL

## 2022-04-21 LAB
ANION GAP SERPL CALC-SCNC: 9 MEQ/L (ref 3–15)
BUN SERPL-MCNC: 8 MG/DL (ref 8–20)
CALCIUM SERPL-MCNC: 7.5 MG/DL (ref 8.9–10.3)
CHLORIDE SERPL-SCNC: 100 MEQ/L (ref 98–109)
CO2 SERPL-SCNC: 28 MEQ/L (ref 22–32)
CREAT SERPL-MCNC: 0.6 MG/DL (ref 0.6–1.1)
CRP SERPL-MCNC: 273.1 MG/L
ERYTHROCYTE [DISTWIDTH] IN BLOOD BY AUTOMATED COUNT: 15.5 % (ref 11.7–14.4)
GFR SERPL CREATININE-BSD FRML MDRD: >60 ML/MIN/1.73M*2
GLUCOSE SERPL-MCNC: 110 MG/DL (ref 70–99)
HCT VFR BLDCO AUTO: 30.6 % (ref 35–45)
HGB BLD-MCNC: 10 G/DL (ref 11.8–15.7)
MAGNESIUM SERPL-MCNC: 2 MG/DL (ref 1.8–2.5)
MCH RBC QN AUTO: 26.7 PG (ref 28–33.2)
MCHC RBC AUTO-ENTMCNC: 32.7 G/DL (ref 32.2–35.5)
MCV RBC AUTO: 81.8 FL (ref 83–98)
PDW BLD AUTO: 9.5 FL (ref 9.4–12.3)
PLATELET # BLD AUTO: 288 K/UL (ref 150–369)
POTASSIUM SERPL-SCNC: 2.9 MEQ/L (ref 3.6–5.1)
RBC # BLD AUTO: 3.74 M/UL (ref 3.93–5.22)
SODIUM SERPL-SCNC: 137 MEQ/L (ref 136–144)
WBC # BLD AUTO: 19.37 K/UL (ref 3.8–10.5)

## 2022-04-21 PROCEDURE — 86038 ANTINUCLEAR ANTIBODIES: CPT | Performed by: HOSPITALIST

## 2022-04-21 PROCEDURE — 63700000 HC SELF-ADMINISTRABLE DRUG: Performed by: NURSE PRACTITIONER

## 2022-04-21 PROCEDURE — 83735 ASSAY OF MAGNESIUM: CPT | Performed by: HOSPITALIST

## 2022-04-21 PROCEDURE — 63700000 HC SELF-ADMINISTRABLE DRUG: Performed by: HOSPITALIST

## 2022-04-21 PROCEDURE — 93970 EXTREMITY STUDY: CPT

## 2022-04-21 PROCEDURE — 86140 C-REACTIVE PROTEIN: CPT | Performed by: HOSPITALIST

## 2022-04-21 PROCEDURE — 63600000 HC DRUGS/DETAIL CODE: Performed by: HOSPITALIST

## 2022-04-21 PROCEDURE — 36415 COLL VENOUS BLD VENIPUNCTURE: CPT | Performed by: HOSPITALIST

## 2022-04-21 PROCEDURE — 99233 SBSQ HOSP IP/OBS HIGH 50: CPT | Performed by: HOSPITALIST

## 2022-04-21 PROCEDURE — 20600000 HC ROOM AND CARE INTERMEDIATE/TELEMETRY

## 2022-04-21 PROCEDURE — 80048 BASIC METABOLIC PNL TOTAL CA: CPT | Performed by: HOSPITALIST

## 2022-04-21 PROCEDURE — 85027 COMPLETE CBC AUTOMATED: CPT | Performed by: HOSPITALIST

## 2022-04-21 PROCEDURE — 93970 EXTREMITY STUDY: CPT | Mod: 26 | Performed by: SURGERY

## 2022-04-21 RX ADMIN — KETOROLAC TROMETHAMINE 15 MG: 15 INJECTION, SOLUTION INTRAMUSCULAR; INTRAVENOUS at 14:05

## 2022-04-21 RX ADMIN — KETOROLAC TROMETHAMINE 15 MG: 15 INJECTION, SOLUTION INTRAMUSCULAR; INTRAVENOUS at 20:37

## 2022-04-21 RX ADMIN — DOXYCYCLINE HYCLATE 100 MG: 100 TABLET, FILM COATED ORAL at 08:55

## 2022-04-21 RX ADMIN — ACETAMINOPHEN 650 MG: 325 TABLET ORAL at 07:41

## 2022-04-21 RX ADMIN — HEPARIN SODIUM 5000 UNITS: 5000 INJECTION, SOLUTION INTRAVENOUS; SUBCUTANEOUS at 21:37

## 2022-04-21 RX ADMIN — ACETAMINOPHEN 650 MG: 325 TABLET ORAL at 20:30

## 2022-04-21 RX ADMIN — DIPHENHYDRAMINE HYDROCHLORIDE AND LIDOCAINE HYDROCHLORIDE AND ALUMINUM HYDROXIDE AND MAGNESIUM HYDRO 15 ML: KIT at 14:05

## 2022-04-21 RX ADMIN — DIPHENHYDRAMINE HYDROCHLORIDE AND LIDOCAINE HYDROCHLORIDE AND ALUMINUM HYDROXIDE AND MAGNESIUM HYDRO 15 ML: KIT at 08:55

## 2022-04-21 RX ADMIN — DIPHENHYDRAMINE HYDROCHLORIDE AND LIDOCAINE HYDROCHLORIDE AND ALUMINUM HYDROXIDE AND MAGNESIUM HYDRO 15 ML: KIT at 21:37

## 2022-04-21 RX ADMIN — DOXYCYCLINE HYCLATE 100 MG: 100 TABLET, FILM COATED ORAL at 20:30

## 2022-04-21 RX ADMIN — HEPARIN SODIUM 5000 UNITS: 5000 INJECTION, SOLUTION INTRAVENOUS; SUBCUTANEOUS at 14:05

## 2022-04-21 RX ADMIN — HEPARIN SODIUM 5000 UNITS: 5000 INJECTION, SOLUTION INTRAVENOUS; SUBCUTANEOUS at 05:56

## 2022-04-21 RX ADMIN — KETOROLAC TROMETHAMINE 15 MG: 15 INJECTION, SOLUTION INTRAMUSCULAR; INTRAVENOUS at 00:19

## 2022-04-21 RX ADMIN — KETOROLAC TROMETHAMINE 15 MG: 15 INJECTION, SOLUTION INTRAMUSCULAR; INTRAVENOUS at 07:41

## 2022-04-21 RX ADMIN — MORPHINE SULFATE 1 MG: 2 INJECTION, SOLUTION INTRAMUSCULAR; INTRAVENOUS at 00:19

## 2022-04-21 NOTE — PROGRESS NOTES
Hospital Medicine Service -  Daily Progress Note       SUBJECTIVE   Interval History: Feels a little better overall     OBJECTIVE      Vital signs in last 24 hours:  Temp:  [37.1 °C (98.7 °F)-38 °C (100.4 °F)] 37.2 °C (98.9 °F)  Heart Rate:  [] 101  Resp:  [18-24] 18  BP: (118-134)/(69-80) 133/80    Intake/Output Summary (Last 24 hours) at 4/21/2022 0737  Last data filed at 4/20/2022 1630  Gross per 24 hour   Intake 550 ml   Output --   Net 550 ml       PHYSICAL EXAMINATION      Physical Exam  General:    Looks ill and tired   Head:    Normocephalic, atraumatic   Eyes:     EOMI BL                Lungs:     respirations unlabored   Heart:    RRR,    Abdomen:     Soft, mild lower abd ttp   Extremities:  Musculoskeletal:    no cyanosis or edema    moves all limbs                     Neurologic:  Behavior/Emotional:   AAOx3. Grossly nonfocal    Appropriate, odd affect          LINES, CATHETERS, DRAINS, AIRWAYS, AND WOUNDS   Lines, Drains, and Airways:  Wounds (agree with documentation and present on admission):  Peripheral IV (Adult) 04/17/22 Right Antecubital (Active)   Number of days: 4         Comments:      LABS / IMAGING / TELE      Labs  Labs are pending.    SARS-CoV-2 (COVID-19) (no units)   Date/Time Value   04/17/2022 0953 Negative       Imaging  I have independently reviewed the pertinent imaging from the last 24 hrs.    ECG/Telemetry  I have independently reviewed the telemetry. No events for the last 24 hours.    ASSESSMENT AND PLAN      Hypokalemia  Assessment & Plan  k 2.8 yesterday, todays pending  , will repleat and monitor    Hyponatremia  Assessment & Plan  Improving, d/c IV fluids, encourage PO intake    Dehydration  Assessment & Plan  Low PO intake PTA with hyponatremia  S/p  IV fluids  Encourage PO intake    Sepsis, due to unspecified organism, unspecified whether acute organ dysfunction present (CMS/[facility])  Assessment & Plan  ON admission: Septic shock-fever-symptoms ongoing for 4 days with  pain in bilateral flank-dysuria  CT Findings consistent with bilateral ureteritis with right greater than left   urothelial enhancement and periureteral fat stranding.   2.  Nonspecific retroperitoneal fat stranding surrounding the abdominal aorta.   Although this could be reactive to the aforementioned findings of ureteritis,   the possibility of inflammatory aortitis should also be considered.  Follow-up   CT is recommended in 4 weeks, following empiric therapy to ensure resolution of   this finding. ----> to be done as OP likely    Overall stable, wbc count improved yesterday (todays pending), temp to 100.4 last night noted  Overall likely  source  Urology consulted, watch for retention  ID following, Cultures pending, cont antibiotics as outlined           VTE Assessment: Padua VTE Score: 3  VTE Prophylaxis:  Current anticoagulants:  heparin (porcine) 5,000 unit/mL injection 5,000 Units, subcutaneous, q8h ALDEN      Code Status: Full Code  Palliative Care Screening Score: 0   Estimated Discharge Date: 4/25/2022     Disposition Planning: cont inpt mn     Jarret Green MD  4/21/2022

## 2022-04-21 NOTE — PATIENT CARE CONFERENCE
Care Progression Rounds Note  Date: 4/21/2022  Time: 11:32 AM     Patient Name: Christa Willett     Medical Record Number: [id number]   YOB: 1970  Sex: Female      Room/Bed: 2509    Admitting Diagnosis: Ureteritis [N28.89]  Sepsis, due to unspecified organism, unspecified whether acute organ dysfunction present (CMS/[facility]) [A41.9]   Admit Date/Time: 4/17/2022  9:42 AM    Primary Diagnosis: No Principal Problem: There is no principal problem currently on the Problem List. Please update the Problem List and refresh.  Principal Problem: No Principal Problem: There is no principal problem currently on the Problem List. Please update the Problem List and refresh.    GMLOS: 3.5  Anticipated Discharge Date: 4/22/2022    AM-PAC:  Mobility Score:      Discharge Planning:  Concerns to be Addressed: denies needs/concerns at this time, no discharge needs identified  Anticipated Discharge Disposition: home without assistance or services    Barriers to Discharge:  Medical issues not resolved    Comments:  oral meds, OOB, pain improved, fever this am, oral sores in mouth & legs,    Participants:  social work/services, nursing, , pharmacy

## 2022-04-22 VITALS
TEMPERATURE: 100.8 F | OXYGEN SATURATION: 95 % | HEART RATE: 98 BPM | HEIGHT: 60 IN | BODY MASS INDEX: 36.32 KG/M2 | SYSTOLIC BLOOD PRESSURE: 128 MMHG | RESPIRATION RATE: 18 BRPM | DIASTOLIC BLOOD PRESSURE: 70 MMHG | WEIGHT: 185 LBS

## 2022-04-22 LAB
ANION GAP SERPL CALC-SCNC: 8 MEQ/L (ref 3–15)
BACTERIA BLD CULT: NORMAL
BACTERIA BLD CULT: NORMAL
BUN SERPL-MCNC: 10 MG/DL (ref 8–20)
CALCIUM SERPL-MCNC: 7.7 MG/DL (ref 8.9–10.3)
CHLORIDE SERPL-SCNC: 100 MEQ/L (ref 98–109)
CO2 SERPL-SCNC: 30 MEQ/L (ref 22–32)
CREAT SERPL-MCNC: 0.6 MG/DL (ref 0.6–1.1)
ERYTHROCYTE [DISTWIDTH] IN BLOOD BY AUTOMATED COUNT: 15.4 % (ref 11.7–14.4)
GFR SERPL CREATININE-BSD FRML MDRD: >60 ML/MIN/1.73M*2
GLUCOSE SERPL-MCNC: 111 MG/DL (ref 70–99)
HCT VFR BLDCO AUTO: 29.5 % (ref 35–45)
HGB BLD-MCNC: 9.7 G/DL (ref 11.8–15.7)
MCH RBC QN AUTO: 26.9 PG (ref 28–33.2)
MCHC RBC AUTO-ENTMCNC: 32.9 G/DL (ref 32.2–35.5)
MCV RBC AUTO: 81.7 FL (ref 83–98)
PDW BLD AUTO: 9.6 FL (ref 9.4–12.3)
PLATELET # BLD AUTO: 297 K/UL (ref 150–369)
POTASSIUM SERPL-SCNC: 3.1 MEQ/L (ref 3.6–5.1)
RBC # BLD AUTO: 3.61 M/UL (ref 3.93–5.22)
SODIUM SERPL-SCNC: 138 MEQ/L (ref 136–144)
WBC # BLD AUTO: 17.79 K/UL (ref 3.8–10.5)

## 2022-04-22 PROCEDURE — 85027 COMPLETE CBC AUTOMATED: CPT | Performed by: HOSPITALIST

## 2022-04-22 PROCEDURE — 36415 COLL VENOUS BLD VENIPUNCTURE: CPT | Performed by: HOSPITALIST

## 2022-04-22 PROCEDURE — 99239 HOSP IP/OBS DSCHRG MGMT >30: CPT | Performed by: HOSPITALIST

## 2022-04-22 PROCEDURE — 63700000 HC SELF-ADMINISTRABLE DRUG: Performed by: HOSPITALIST

## 2022-04-22 PROCEDURE — 63600000 HC DRUGS/DETAIL CODE: Performed by: HOSPITALIST

## 2022-04-22 PROCEDURE — 80048 BASIC METABOLIC PNL TOTAL CA: CPT | Performed by: HOSPITALIST

## 2022-04-22 PROCEDURE — 63700000 HC SELF-ADMINISTRABLE DRUG: Performed by: NURSE PRACTITIONER

## 2022-04-22 RX ORDER — POTASSIUM CHLORIDE 20 MEQ/1
40 TABLET, EXTENDED RELEASE ORAL ONCE
Status: COMPLETED | OUTPATIENT
Start: 2022-04-22 | End: 2022-04-22

## 2022-04-22 RX ADMIN — ACETAMINOPHEN 650 MG: 325 TABLET ORAL at 11:54

## 2022-04-22 RX ADMIN — POTASSIUM CHLORIDE 40 MEQ: 1500 TABLET, EXTENDED RELEASE ORAL at 08:53

## 2022-04-22 RX ADMIN — DOXYCYCLINE HYCLATE 100 MG: 100 TABLET, FILM COATED ORAL at 08:53

## 2022-04-22 RX ADMIN — KETOROLAC TROMETHAMINE 15 MG: 15 INJECTION, SOLUTION INTRAMUSCULAR; INTRAVENOUS at 04:54

## 2022-04-22 RX ADMIN — DIPHENHYDRAMINE HYDROCHLORIDE AND LIDOCAINE HYDROCHLORIDE AND ALUMINUM HYDROXIDE AND MAGNESIUM HYDRO 15 ML: KIT at 08:53

## 2022-04-22 RX ADMIN — ACETAMINOPHEN 650 MG: 325 TABLET ORAL at 04:53

## 2022-04-22 RX ADMIN — HEPARIN SODIUM 5000 UNITS: 5000 INJECTION, SOLUTION INTRAVENOUS; SUBCUTANEOUS at 05:00

## 2022-04-22 NOTE — CONSULTS
Rheumatology Initial Consultation: Dr. Joyce      HPI:    The patient is a 53 yo female who was in excellent health until 1-2 days PTA when she noted the abrupt onset of atraumatic R periscapular pain, followed in quick order with malaise, anorexia, bilateral flank pain, fevers reaching 103 and possible dysuria.New-onset orogenital ulcers with the only new Rx being a muscle relaxant. Probable urosepsis and imaging evidence of ureteritis with possible aortitis. Rx'd with ATB with progressive gains and now feels 80% better. No history of joint swelling, rash, Raynaud's, pleuritic symptoms, dysphagia or sicca.       Medical History: History reviewed. No pertinent past medical history.    Surgical History:   Past Surgical History:   Procedure Laterality Date   • APPENDECTOMY      1wk pospartum has ruptured appendix, and had right oophorectomy also   • POSTPARTUM TUBAL LIGATION  2011   • RIGHT OOPHORECTOMY Right     at same time as appendectomy   • TONSILLECTOMY & ADENOIDECTOMY         Allergies: Red dye, Latex, and Penicillins    Current Facility-Administered Medications   Medication Dose Route Frequency Provider Last Rate Last Admin   • acetaminophen (TYLENOL) tablet 650 mg  650 mg oral q6h PRN Derick Donnelly DO   650 mg at 04/21/22 2030   • calcium carbonate (TUMS) chewable tablet 500 mg  500 mg oral 3x daily PRN Derick Donnelly DO       • glucose chewable tablet 16-32 g of dextrose  16-32 g of dextrose oral PRN Janes Santos MD        Or   • dextrose 40 % oral gel 15-30 g of dextrose  15-30 g of dextrose oral PRN Janes Santos MD        Or   • glucagon (GLUCAGEN) injection 1 mg  1 mg intramuscular PRN Janes Santos MD        Or   • dextrose in water injection 12.5 g  25 mL intravenous PRN Janes Santos MD       • doxycycline hyclate (VIBRA-TABS) tablet 100 mg  100 mg oral q12h Dee Monson CRNP   100 mg at 04/21/22 2030   • heparin (porcine) 5,000 unit/mL injection 5,000 Units  5,000 Units subcutaneous  q8h Janes Smith MD   5,000 Units at 04/21/22 1405   • ipratropium-albuteroL (DUO-NEB) 0.5-2.5 mg/3 mL nebulizer solution 3 mL  3 mL nebulization q8h PRN Derick Donnelly, DO   3 mL at 04/20/22 0651   • ketorolac (TORADOL) injection 15 mg  15 mg intravenous q6h PRN Olex Derick A, DO   15 mg at 04/21/22 2037   • magic mouthwash oral suspension (Benadryl-Maalox-Lidocaine)  15 mL Swish & Spit TID Jarret Green MD   15 mL at 04/21/22 1405   • morphine injection 1 mg  1 mg intravenous q3h PRN Jarret Green MD   1 mg at 04/21/22 0019   • ondansetron (ZOFRAN) injection 4 mg  4 mg intravenous q6h PRN OleDerick hackett A, DO   4 mg at 04/18/22 2128        Social History:   Social History     Socioeconomic History   • Marital status:      Spouse name: None   • Number of children: None   • Years of education: None   • Highest education level: None   Occupational History   • Occupation: Director of career servies at [employer]   Tobacco Use   • Smoking status: Never Smoker   Substance and Sexual Activity   • Alcohol use: Never   • Drug use: Never   • Sexual activity: Not Currently     Social Determinants of Health     Food Insecurity: No Food Insecurity   • Worried About Running Out of Food in the Last Year: Never true   • Ran Out of Food in the Last Year: Never true       Family History:   Family History   Problem Relation Age of Onset   • Cancer Biological Father        Vital signs in last 24 hours:  Temp:  [37.1 °C (98.8 °F)-38.7 °C (101.7 °F)] 38.7 °C (101.7 °F)  Heart Rate:  [] 97  Resp:  [18] 18  BP: (107-133)/(68-80) 125/72    Physical Exam  GENERAL: mildy ill-appearing; no conversational  dyspnea; alert and oriented x 3 spheres; HEENT:thyroid-no nodules of thyromegaly; negative carotid bruit; ADENOPATHY: none palpated; HEART: regular rate and rhythm with no gallops or rubs; no murmur; LUNGS: clear to auscultation and percussion; ABDOMEN: soft, normal bowel sounds, no rebound  tenderness, no organomegaly or masses;no bilateral CVAT; EXT: no cyanosis, clubbing or edema; NEURO: normal  light touch sensation; reflexes symmetrical; normal cerebellar examination; negative bilateral Babinski's; negative bilateral clonus; 5/5 global muscle strength;   negative bilateral carpal tunnel Tinel test; SKIN: no sclerodactyly, scleroderma, digital ulcerations, livedo reticularis, stigmata of vasculitis,ischemic changes of the upper or lower extremities; no subcutaneous nodules; no psoriatic plaques; MSK: no peripheral synovitis;  no axial or rib punch tenderness; cervical spine with  range of motion; no fibrositic tender points; no hypermobility of large appendicular joints; negative bilateral sitting/supine straight leg raising maneuvers; no finger flexor tenderness or locking    Labs/Imaging   No results displayed because visit has over 200 results.        [unfilled]  @[id number]@  I have reviewed the patient's pertinent labs and imaging data    IMP:  1) Clinical findings most suggestive of an infectious etiology that has, according to the patient, improved on a daily basis and now feels '80% back to normal.' Unlikely dealing with Behcets and more likely dealing with Rx-induced process (had started a muscle relaxant a day prior to admission). As concerns abdominal aortitis, she should not be improving with ATB's.    PLAN:   1) Expectant management from rheum standpoint  2) Agree with follow-up abdominal imaging     Zane Joyce MD

## 2022-04-22 NOTE — PLAN OF CARE
Problem: Adult Inpatient Plan of Care  Goal: Patient-Specific Goal (Individualized)  Outcome: Progressing     Problem: Adult Inpatient Plan of Care  Goal: Optimal Comfort and Wellbeing  Outcome: Progressing   Plan of Care Review  Plan of Care Reviewed With: patient  Progress: improving  Outcome Summary: Pt is AAOX4, c/o intemittent flank pain, and running fever, MD aware and pt is on abx and tylenol in addition of toradol for pain. SR on monitor, other VSS.

## 2022-04-22 NOTE — PROGRESS NOTES
Infectious Disease Progress Note    Patient Name: Christa Willett  MR#: [id number]  : 1970  Admission Date: 2022  Date: 22   Time: 1:34 PM   Author: KAYLEIGH Ness    Major Events:     Antibiotics:    Anti-infectives (From admission, onward)    Start     Dose/Rate Route Frequency Ordered Stop    22 2100  doxycycline hyclate (VIBRA-TABS) tablet 100 mg         100 mg oral Every 12 hours 22 1304            Subjective   Patient states she sometimes has shaking rigors but they don't persist and are intermittent, states her mouth sores are improving some and the labia is feeling better, still some back pain, tolerating diet, d/w nursing and patient had a temp of 102.8 today    Review of Systems    Pertinent items are noted in HPI.    Objective     Vital Signs:    Patient Vitals for the past 72 hrs:   BP Temp Temp src Pulse Resp SpO2 Weight   22 1200 128/70 (!) 38.2 °C (100.8 °F) Oral 98 18 -- --   22 1000 111/73 -- -- 88 16 -- --   22 0740 113/67 36.7 °C (98.1 °F) -- 84 18 95 % --   22 0620 114/62 37.5 °C (99.5 °F) Oral 94 18 93 % --   22 0445 134/67 -- -- 98 -- 95 % --   22 0400 134/67 (!) 39.3 °C (102.8 °F) Oral 98 18 98 % --   22 0200 127/64 -- -- 82 -- 98 % --   22 2356 127/64 37.2 °C (98.9 °F) Oral 84 18 98 % --   22 2300 -- -- -- 84 -- -- --   22 2210 127/64 37.3 °C (99.2 °F) Oral 88 18 96 % --   22 133/64 -- -- (!) 104 -- 92 % --   22 -- (!) 38.7 °C (101.7 °F) -- -- -- -- --   22 -- -- -- 100 -- -- --   22 1600 125/72 37.8 °C (100 °F) Axillary 97 -- 97 % --   22 1500 -- -- -- 97 -- -- --   22 1400 123/70 -- -- 99 -- -- --   22 1200 116/79 37.2 °C (98.9 °F) Oral 83 -- 98 % --   22 1000 107/71 -- -- 85 -- 98 % --   22 0800 -- -- -- (!) 103 -- -- --   22 0738 133/68 (!) 38.4 °C (101.1 °F) Oral (!) 103 18 99 % --   22 0600 133/80 -- -- (!)  101 18 98 % --   22 0414 123/77 37.2 °C (98.9 °F) Oral 93 18 97 % --   22 0300 118/77 37.1 °C (98.8 °F) Oral 91 18 95 % --   22 0043 -- -- -- -- 18 (!) 91 % --   22 0000 -- 37.5 °C (99.5 °F) Oral 95 -- -- --   22 2300 128/72 37.8 °C (100 °F) Oral 73 18 94 % --   22 2200 128/72 -- -- (!) 108 18 100 % --   22 2000 133/77 38 °C (100.4 °F) Oral (!) 110 18 95 % --   22 1900 -- -- -- (!) 113 -- -- --   22 1800 -- 37.3 °C (99.2 °F) Oral (!) 108 20 94 % --   22 1608 134/76 37.1 °C (98.7 °F) Oral 97 18 93 % --   22 1500 -- -- -- 96 -- -- --   22 1400 128/74 37.2 °C (99 °F) Oral 96 20 94 % --   22 1300 -- 38 °C (100.4 °F) -- -- -- -- --   22 1200 -- 37.7 °C (99.9 °F) Oral -- (!) 24 -- --   22 0830 -- -- -- (!) 101 -- -- --   22 0800 118/69 37.7 °C (99.8 °F) Oral (!) 113 (!) 24 98 % --   22 0600 121/67 -- -- 99 (!) 30 96 % --   22 0400 (!) 116/57 36.8 °C (98.3 °F) Axillary 98 (!) 28 97 % --   22 0200 115/65 -- -- 97 (!) 26 98 % --   22 0000 115/73 36.7 °C (98 °F) Oral (!) 102 (!) 28 93 % 83.9 kg (185 lb)   22 2200 124/64 -- -- (!) 105 (!) 30 98 % --   22 2138 -- 37.9 °C (100.3 °F) -- -- -- -- --   22 2000 134/71 37.1 °C (98.8 °F) Oral (!) 107 (!) 28 (!) 90 % --   22 1800 120/74 37.7 °C (99.8 °F) Oral 98 20 95 % --   22 1600 128/76 37.8 °C (100.1 °F) Oral (!) 106 18 98 % --   22 1500 -- -- -- (!) 101 -- -- --   22 1400 135/74 37.7 °C (99.8 °F) Oral (!) 106 20 94 % --       Temp (72hrs), Av.6 °C (99.7 °F), Min:36.7 °C (98 °F), Max:39.3 °C (102.8 °F)      Physical Exam:    Visit Vitals  /70 (BP Location: Left upper arm, Patient Position: Lying)   Pulse 98   Temp (!) 38.2 °C (100.8 °F) (Oral)   Resp 18   Ht 1.524 m (5')   Wt 83.9 kg (185 lb)   LMP 04/15/2022 (Exact Date)   SpO2 95%   BMI 36.13 kg/m²     General appearance: alert, appears stated age, cooperative,  fatigued and no distress  Eyes: anicteric   Mouth: oral lesions present on the buccal surfaces and the soft palate, buccal lesions appears like ulcers and palate lesion more of a small pustule like appearance  Lungs: nonlabored, no cough observed  Abdomen: soft, ND, NT  Female genitalia: right labia without induration or tenderness, left lower labia with mild induration and tenderness  Extremities: hive-like patches on the upper legs are flat and without erythema now, normal ROM, no edema  Neurologic: Mental status: Alert, oriented, thought content appropriate    Lines, Drains, Airways, Wounds:  Peripheral IV (Adult) 04/17/22 Right Antecubital (Active)   Number of days: 5       Labs:    CBC Results       04/22/22 04/21/22 04/20/22     0456 1227 0639    WBC 17.79 19.37 19.76    RBC 3.61 3.74 3.19    HGB 9.7 10.0 8.6    HCT 29.5 30.6 26.2    MCV 81.7 81.8 82.1    MCH 26.9 26.7 27.0    MCHC 32.9 32.7 32.8     288 214      BMP Results       04/22/22 04/21/22 04/20/22     0456 1227 0639     137 135    K 3.1 2.9 2.8    Cl 100 100 104    CO2 30 28 24    Glucose 111 110 99    BUN 10 8 8    Creatinine 0.6 0.6 0.5    Calcium 7.7 7.5 6.5    Anion Gap 8 9 7    EGFR >60.0 >60.0 >60.0      PT/PTT Results    No lab values to display.     UA Results       04/17/22     1023    Color Yellow    Clarity Cloudy    Glucose Negative    Bilirubin Negative    Ketones Negative    Sp Grav 1.019    Blood +3    Ph 6.0    Protein +1    Urobilinogen 0.2    Nitrite Negative    Leuk Est +3    WBC Too Numerous To Count    RBC Too Numerous To Count    Bacteria Rare         Comment for Blood at 1023 on 04/17/22: The sensitivity of the occult blood test is equivalent to approximately 4 intact RBC/HPF.      Lactate Results       04/17/22 04/17/22     1603 0952    Lactate 1.0 1.7          Microbiology Results     Procedure Component Value Units Date/Time    HSV 1/2 PCR Vagina [[id number]]  (Normal) Collected: 04/20/22 0823    Specimen:  Vessicle Swab from Vagina Updated: 04/20/22 1430     HSV 1 DNA Not Detected     HSV 2 DNA Not Detected    Superficial Fungal Stain [[id number]] Collected: 04/19/22 1739    Specimen: Skin Updated: 04/20/22 1204     Fungus Stain No fungal elements seen    Superficial Fungal Culture [[id number]]  (Normal) Collected: 04/19/22 1739    Specimen: Skin Updated: 04/21/22 0000     Fungal Culture No Fungus Isolated to Date    Chlamydia/GC RNA:ThinPrep/Urine/Swab Urine, Clean Catch [[id number]]  (Normal) Collected: 04/18/22 1557    Specimen: Urine, Clean Catch Updated: 04/19/22 1601     Chlamydia Trachomatis rRNA Negative     Neisseria gonorrhoeae rRNA Negative    Urine culture Urine, Clean Catch [[id number]]  (Normal) Collected: 04/17/22 1023    Specimen: Urine, Clean Catch Updated: 04/19/22 0846     Urine Culture No Growth (Limit of detection 1000 cfu/mL)    Blood Culture Blood, Venous [[id number]]  (Normal) Collected: 04/17/22 0955    Specimen: Blood, Venous Updated: 04/22/22 1303     Culture No growth at 120 hours    SARS-CoV-2 (COVID-19), PCR Nasopharynx [[id number]]  (Normal) Collected: 04/17/22 0953    Specimen: Nasopharyngeal Swab from Nasopharynx Updated: 04/17/22 1049    Narrative:      The following orders were created for panel order SARS-CoV-2 (COVID-19), PCR Nasopharynx.  Procedure                               Abnormality         Status                     ---------                               -----------         ------                     SARS-COV-2 (COVID-19)/ F...[[id number]]  Normal              Final result                 Please view results for these tests on the individual orders.    SARS-COV-2 (COVID-19)/ FLU A/B, AND RSV, PCR Nasopharynx [[id number]]  (Normal) Collected: 04/17/22 0953    Specimen: Nasopharyngeal Swab from Nasopharynx Updated: 04/17/22 1049     SARS-CoV-2 (COVID-19) Negative     Influenza A Negative     Influenza B Negative     Respiratory Syncytial Virus Negative    Blood Culture Blood,  Venous [[id number]]  (Normal) Collected: 04/17/22 0952    Specimen: Blood, Venous Updated: 04/22/22 1303     Culture No growth at 120 hours          Pathology Results     ** No results found for the last 720 hours. **          Echo:         Imaging:    Radiology Imaging    XR CHEST 1 VW    Narrative  CLINICAL HISTORY: SOB, pulmonary edema suspected    TECHNIQUE: Single AP view of the chest.    COMMENT:    Comparison made to 04/17/2022.    Cardiac leads overlie the chest.    Retrocardiac airspace opacity with suspected left pleural effusion. There is  airspace opacity in the right lower lobe. Findings are new since prior  examination concerning for pulmonary edema.    Cardiomediastinal silhouette and osseus structures are stable.    --    Impression  See comment.      Assessment     Rash, ? Allergic reaction - resolving  - antibiotics changed and rash resolving    Labial lesions, oral lesions  - no lesions on palms or soles  - HSV 1&2 negative  - STD workup negative    FUO- 102.8 today  - ? Etiology   - all cultures sterile  - persistent leukocytosis  - CT and CXR reviewed  - GYN input noted  - rheumatology input noted     Allergy to PCN caused a rash however she tolerates amoxicillin and cephalosporins        Plan     Unclear the etiology of her fevers and additional workup including CT chest , possible CT A/P with oral contrast, echo, d/w attending

## 2022-04-22 NOTE — PATIENT CARE CONFERENCE
Care Progression Rounds Note  Date: 4/22/2022  Time: 11:52 AM     Patient Name: Christa Willett     Medical Record Number: [id number]   YOB: 1970  Sex: Female      Room/Bed: 2509    Admitting Diagnosis: Ureteritis [N28.89]  Sepsis, due to unspecified organism, unspecified whether acute organ dysfunction present (CMS/[facility]) [A41.9]   Admit Date/Time: 4/17/2022  9:42 AM    Primary Diagnosis: No Principal Problem: There is no principal problem currently on the Problem List. Please update the Problem List and refresh.  Principal Problem: No Principal Problem: There is no principal problem currently on the Problem List. Please update the Problem List and refresh.    GMLOS: 3.5  Anticipated Discharge Date: 4/22/2022    AM-PAC:  Mobility Score:      Discharge Planning:  Concerns to be Addressed: denies needs/concerns at this time, no discharge needs identified  Anticipated Discharge Disposition: home without assistance or services    Barriers to Discharge:  None    Comments:  temps, BC(-), sores in mouth & legs result of penicillin allergy (?),    Participants:  social work/services, nursing, , pharmacy

## 2022-04-22 NOTE — CONSULTS
"Gynecology Consult Note    Subjective     Patient is a 51yo  female who was admitted with sepsis.  She was also found to have vaginal ulcers.  She also reports painful sores in her mouth.  Today she states she is feeling better, continues to improve in symptoms every day.  Vaginal sores still present but improved.  Mouth sores also still present.    Pt has regular Gyn care in [place].  She recently relocated to the [facility] for a job at [employer].   She denies any h/o STD's or abnormal  Paps.  She denies any sexual activity for over a year.   Periods are regular every month.  Se denies abnormal vaginal discharge.  She does shave/clip the genital area and at times will \"abram\" and ingrown hair.  She states she has NOT done this recently. She denies exposure to any new irritants, except a different soap 3 weeks ago.      .  Allergies: Red dye, Latex, and Penicillins      Current Facility-Administered Medications:   •  acetaminophen (TYLENOL) tablet 650 mg, 650 mg, oral, q6h PRN, Derick Donnelly A, DO, 650 mg at 04/22/22 0453  •  calcium carbonate (TUMS) chewable tablet 500 mg, 500 mg, oral, 3x daily PRN, Olex Derick A, DO  •  glucose chewable tablet 16-32 g of dextrose, 16-32 g of dextrose, oral, PRN **OR** dextrose 40 % oral gel 15-30 g of dextrose, 15-30 g of dextrose, oral, PRN **OR** glucagon (GLUCAGEN) injection 1 mg, 1 mg, intramuscular, PRN **OR** dextrose in water injection 12.5 g, 25 mL, intravenous, PRN, Janes Santos MD  •  doxycycline hyclate (VIBRA-TABS) tablet 100 mg, 100 mg, oral, q12h Linda RUANO Christina A, CRNP, 100 mg at 04/22/22 0853  •  heparin (porcine) 5,000 unit/mL injection 5,000 Units, 5,000 Units, subcutaneous, q8h Tom RUANO Hammad, MD, 5,000 Units at 04/22/22 0500  •  ipratropium-albuteroL (DUO-NEB) 0.5-2.5 mg/3 mL nebulizer solution 3 mL, 3 mL, nebulization, q8h PRN, Derick Donnelly DO, 3 mL at 04/20/22 0651  •  ketorolac (TORADOL) injection 15 mg, 15 mg, intravenous, q6h PRN, " Olex, Derick A, DO, 15 mg at 04/22/22 0454  •  magic mouthwash oral suspension (Benadryl-Maalox-Lidocaine), 15 mL, Swish & Spit, TID, Jarret Green MD, 15 mL at 04/22/22 0853  •  morphine injection 1 mg, 1 mg, intravenous, q3h PRN, aJrret Green MD, 1 mg at 04/21/22 0019  •  ondansetron (ZOFRAN) injection 4 mg, 4 mg, intravenous, q6h PRN, Olex, Derick A, DO, 4 mg at 04/18/22 2128        Objective    Ehkf=192.8 at 0400  GX=466-968/62-73; 111/73  Pulse=92-98  Pox=93-98%    Labs:  HIV-neg  RPR-neg  Hep B, C-neg  BMP: 134/3.1-105/22-10/0.7<100  CBC: 25.55>9.0/27.1<220K; 14 bands  GC/CT-neg  Covid 19-neg  Influenza A, B-neg  RSV-neg  Blood and urine cultures NGTD  Fungal swab - NEGATIVE  Herpes swab - NEGATIVE      Imaging/Ultrasounds:   Narrative & Impression   CLINICAL HISTORY:     Flank pain     COMPARISON:    None        COMMENT:  Technique: CT of the Abdomen and Pelvis was performed with IV contrast:  100mL  of iohexoL (OMNIPAQUE 350) 350 mg iodine/mL solution 100 mL  Oral contrast was not administered.  CT DOSE:  One or more dose reduction techniques (e.g. automated exposure  control, adjustment of the mA and/or kV according to patient size, use of  iterative reconstruction technique) utilized for this examination.           COMMENT:     LOWER CHEST:     Lower lungs: Within normal limits.  Visualized heart: Within normal limits.  Distal esophagus:  Normal caliber.     ABDOMEN     Liver: within normal limits.  Bile ducts: Normal caliber.  Gallbladder: No calcified gallstones.  Normal caliber wall.  Pancreas: Within normal limits.  Spleen: Top normal size, measuring 14.3 cm.  Adrenals: within normal limits.  Kidneys: There is bilateral urothelial enhancement involving the renal pelvises  and proximal ureters.  Periureteral fat stranding is noted on the right along  the course of the proximal to mid ureter.  Right greater than left ureteral wall  thickening is present.  There are no obstructing  calculi identified.  Delayed imaging reveals normal proximal ureteral opacification.     PELVIS:  Reproductive organs: Uterine fibroids are noted.  Left ovarian cyst measuring  3.1 cm.  Bladder: within normal limits.     Peritoneum: No free air.  No ascites.  Retroperitoneal fat stranding is noted surrounding the aorta and IVC.     Bowel: Mild retained colonic stool.  Normal bowel caliber.  Vessels:  Scattered atherosclerotic calcifications.  Periaortic fat stranding is  noted surrounding the abdominal aorta and proximal iliac vessels.  Lymph Nodes:  No enlarged nodes  Abdominal wall: within normal limits.  Bones: within normal limits.        --  IMPRESSION:     1.   Findings consistent with bilateral ureteritis with right greater than left  urothelial enhancement and periureteral fat stranding.  2.  Nonspecific retroperitoneal fat stranding surrounding the abdominal aorta.  Although this could be reactive to the aforementioned findings of ureteritis,  the possibility of inflammatory aortitis should also be considered.  Follow-up  CT is recommended in 4 weeks, following empiric therapy to ensure resolution of  this finding.  3.  Borderline splenomegaly           Exam:  General Appearance: Alert, cooperative, no acute distress  Lungs:  respirations unlabored    Assessment   Christa Willett is a 52 y.o. female admitted with ureteritis, sepsis with vaginal and oral ulcerations, being consulted for management recommendations       Plan     1.  Ulceration - pt gives no prior history.  Fungal/herpes swabs negative. Oral lesions also present, Rheum consulted, does not feel is Behcet's, or other autoimmune etiology, but more rather a drug reaction.   HIV and RPR were negative.  Local care advised.  2. Fibroid uterus - noted; pt states was unaware; periods regular, not excessive, no AUB.    Will sign off.  Please call if can assist further.  Pt needs Gyn f/u as O.P., she states her Gyn is in [place].

## 2022-04-22 NOTE — DISCHARGE SUMMARY
Hospital Medicine Service -  Inpatient Discharge Summary        BRIEF OVERVIEW   Admitting Provider: Janes Santos MD  Attending Provider: Jarret Green, * Attending phys phone: [phone]    PCP: Nadine Dailey [phone]    Admission Date: 4/17/2022  Discharge Date: 4/22/2022     DISCHARGE DIAGNOSES      Primary Discharge Diagnosis  No Principal Problem: There is no principal problem currently on the Problem List. Please update the Problem List and refresh.    Secondary Discharge Diagnoses  Active Hospital Problems    Diagnosis Date Noted   • Hyponatremia 04/18/2022   • Hypokalemia 04/18/2022   • Sepsis, due to unspecified organism, unspecified whether acute organ dysfunction present (CMS/[facility]) 04/17/2022   • Dehydration 04/17/2022      Resolved Hospital Problems    Diagnosis Date Noted Date Resolved   • Low blood pressure 04/17/2022 04/18/2022       Problem List on Day of Discharge  Hypokalemia  Assessment & Plan  k 3.1  , will repleat and monitor  Will discharge on K supplement for one week with close OP follow up for monitoring    Hyponatremia  Assessment & Plan  Improving, d/c IV fluids, encourage PO intake    Dehydration  Assessment & Plan  Low PO intake PTA with hyponatremia  S/p  IV fluids  Encourage PO intake    Sepsis, due to unspecified organism, unspecified whether acute organ dysfunction present (CMS/[facility])  Assessment & Plan  ON admission: Septic shock-fever-symptoms ongoing for 4 days with pain in bilateral flank-dysuria  CT Findings consistent with bilateral ureteritis with right greater than left   urothelial enhancement and periureteral fat stranding.   2.  Nonspecific retroperitoneal fat stranding surrounding the abdominal aorta.   Although this could be reactive to the aforementioned findings of ureteritis,   the possibility of inflammatory aortitis should also be considered.  Follow-up   CT is recommended in 4 weeks, following empiric therapy to ensure resolution of   this  "finding. ----> to be done as OP likely    Overall clinically she says she is feeling much better, cultures have been neg to date, completed course of antibiotics  Still with Fevers (102.8) last night, noted she tells me she has been having fevers prior to admission for \"weeks\" once a day    Urology consulted- no acute intervention    OBGYN appreciated - Ulcerations on labia and also in oral cavity, Fungal/herpes swabs negative. OP follow up     Rheum appreciated, does not feel is Behcet's, or other autoimmune etiology, but more rather a drug reaction (although no commmon meds she is on to cause, and fevers have been going on for weeks she tells me) .   HIV and RPR were negative.    ID following----> discussed with Dr. Silverio at length, and reviewed Rhum notes as above.Recommend CT chest with IV contrast to qualify findings on aorta on initial CT scan, and would recommend Echo as well. Long discussion with patient about our concerns with her ongoing fevers and mouth/labia lesions that we recommend she stay for additional work up and monitoring, however pt is refusing to stay and wants to leave against this medical advice. She is alert and oriented, of sound mind and mentation, and I do believe she has the capacity to make this decision. She said \" I just want to get home\". She was very pleasant to speak with, and thanked us for our care. She also indicated she will be calling her PCP tomorrow to schedule a follow up appointment. Instead of leaving AMA, as a second best option I will discharge her so she has appropriate follow and discharge paperwork.         SUMMARY OF HOSPITALIZATION      Presenting Problem/History of Present Illness  This is a 52 y.o. year-old female admitted on 4/17/2022 with Ureteritis [N28.89]  Sepsis, due to unspecified organism, unspecified whether acute organ dysfunction present (CMS/[facility]) [A41.9].        Hospital Course  52-year-old female with no significant past medical history presented " with bilateral flank pain fever and dysuria.  Patient had a CAT scan which showed ureteritis, she was seen by urology and infectious disease and started on antibiotics.  Additionally during hospitalization the patient was noted to have painful labial and oral lesions.  She was seen by OB/GYN, her fungal and herpes swabs were negative.  Patient had ongoing fevers, noted she has had these for weeks prior to admission as well, for this reason we had rheumatology evaluate patient.  Thought process was that this was likely not Behcet's or any other autoimmune etiology, there was a possible thought of a drug reaction although no, medications she has been on to cause this are identified and she has had these fevers for weeks.  Her HIV and RPR were negative.  Long discussion with infectious disease, recommended further work-up to include a CAT scan of her chest with IV contrast and also an echocardiogram, unfortunately the patient did not want to stay in the hospital for any additional testing.  Please see problem list below under sepsis for additional hospitalization details.  Instead of leaving AMA as a second best option I will discharge this patient with close outpatient follow-up.    Exam on Day of Discharge  Physical Exam  General:    Looks more alert and awake today   Head:    Normocephalic, atraumatic   Eyes:     EOMI BL                Lungs:     respirations unlabored   Heart:    RRR,    Abdomen:     Soft, mild lower abd ttp   Extremities:  Musculoskeletal:    no cyanosis or edema    moves all limbs                     Neurologic:  Behavior/Emotional:   AAOx3. Grossly nonfocal    Appropriate, odd affect       Consults During Admission  IP CONSULT TO INFECTIOUS DISEASE  IP CONSULT TO INFECTIOUS DISEASE  IP CONSULT TO OB GYN  IP CONSULT TO RHEUMATOLOGY    DISCHARGE MEDICATIONS               Medication List      CONTINUE taking these medications    multivitamin tablet  Take 1 tablet by mouth daily.  Dose: 1 tablet              Instructions for after discharge     Follow-up with primary physician (PCP)      PCP follow up, she will call her tomorrow to schedule an appointment             PROCEDURES / LABS / IMAGING      Operative Procedures      Other Procedures  none    Pertinent Labs  Lab Results   Component Value Date     04/22/2022    K 3.1 (L) 04/22/2022     04/22/2022    CO2 30 04/22/2022    BUN 10 04/22/2022    CREATININE 0.6 04/22/2022    WBC 17.79 (H) 04/22/2022    HGB 9.7 (L) 04/22/2022    HCT 29.5 (L) 04/22/2022     04/22/2022    ALT 28 04/17/2022    AST 24 04/17/2022       SARS-CoV-2 (COVID-19) (no units)   Date/Time Value   04/17/2022 0953 Negative       Pertinent Imaging  CT HEAD WITHOUT IV CONTRAST    Result Date: 4/17/2022  IMPRESSION: 1.   No evidence of acute territorial infarction, acute intracranial hemorrhage, or mass effect.     CT ABDOMEN PELVIS WITH IV CONTRAST    Result Date: 4/17/2022  IMPRESSION: 1.   Findings consistent with bilateral ureteritis with right greater than left urothelial enhancement and periureteral fat stranding. 2.  Nonspecific retroperitoneal fat stranding surrounding the abdominal aorta. Although this could be reactive to the aforementioned findings of ureteritis, the possibility of inflammatory aortitis should also be considered.  Follow-up CT is recommended in 4 weeks, following empiric therapy to ensure resolution of this finding. 3.  Borderline splenomegaly    X-RAY CHEST 1 VIEW    Result Date: 4/20/2022  IMPRESSION: See comment.    X-RAY CHEST 1 VIEW    Result Date: 4/17/2022  IMPRESSION: Top normal heart size       OUTPATIENT  FOLLOW-UP / REFERRALS / PENDING TESTS        Outpatient Follow-Up Appointments  Encounter Information    This patient does not currently have any appointments scheduled.         Referrals  No orders of the defined types were placed in this encounter.      Test Results Pending at Discharge  Unresulted Labs (From admission, onward)              Start     Ordered    04/21/22 1240  JOHN Screen (Automated)  Once        Question:  Release to patient  Answer:  Immediate    04/21/22 1242    04/19/22 1733  Superficial Fungal Culture/Smear Vagina  Once        Comments: Candida     Question:  Release to patient  Answer:  Immediate    04/19/22 1734    04/17/22 1417  Sputum culture / smear Expectorated Sputum  Once        Question:  Release to patient  Answer:  Immediate    04/17/22 1416    04/17/22 1417  Sputum Gram Stain (Lab Only) Expectorated Sputum  PROCEDURE ONCE        Question:  Release to patient  Answer:  Immediate    04/17/22 1416                Important Issues to Address in Follow-Up  PCP follow-up 1 week at discharge.    Total time of discharge management was 48 minutes.    DISCHARGE DISPOSITION AND DESTINATION      Disposition: Home   Destination: Home                            Code Status At Discharge: Full Code    Physician Order for Life-Sustaining Treatment Document Status      No documents found

## 2022-04-25 ENCOUNTER — TRANSITIONAL CARE MANAGEMENT (OUTPATIENT)
Dept: FAMILY MEDICINE CLINIC | Facility: CLINIC | Age: 52
End: 2022-04-25

## 2022-04-25 PROBLEM — N28.89 URETERITIS: Status: ACTIVE | Noted: 2022-04-25

## 2022-04-25 PROBLEM — E87.6 HYPOKALEMIA: Status: ACTIVE | Noted: 2022-04-25

## 2022-04-25 PROBLEM — A41.9 SEPSIS (HCC): Status: ACTIVE | Noted: 2022-04-25

## 2022-04-25 LAB
ANA SER QL IA: NEGATIVE
BSA FOR ECHO PROCEDURE: 1.87 M2

## 2022-04-26 ENCOUNTER — OFFICE VISIT (OUTPATIENT)
Dept: FAMILY MEDICINE CLINIC | Facility: CLINIC | Age: 52
End: 2022-04-26
Payer: COMMERCIAL

## 2022-04-26 ENCOUNTER — OFFICE VISIT (OUTPATIENT)
Dept: OBGYN CLINIC | Facility: CLINIC | Age: 52
End: 2022-04-26
Payer: COMMERCIAL

## 2022-04-26 ENCOUNTER — HOSPITAL ENCOUNTER (OUTPATIENT)
Dept: RADIOLOGY | Facility: HOSPITAL | Age: 52
Discharge: HOME | End: 2022-04-26
Payer: COMMERCIAL

## 2022-04-26 VITALS
RESPIRATION RATE: 16 BRPM | HEIGHT: 60 IN | BODY MASS INDEX: 33.53 KG/M2 | DIASTOLIC BLOOD PRESSURE: 70 MMHG | OXYGEN SATURATION: 97 % | SYSTOLIC BLOOD PRESSURE: 118 MMHG | WEIGHT: 170.8 LBS | HEART RATE: 63 BPM | TEMPERATURE: 97.9 F

## 2022-04-26 VITALS
DIASTOLIC BLOOD PRESSURE: 74 MMHG | SYSTOLIC BLOOD PRESSURE: 118 MMHG | HEIGHT: 61 IN | WEIGHT: 172.4 LBS | BODY MASS INDEX: 32.55 KG/M2

## 2022-04-26 DIAGNOSIS — D50.0 IRON DEFICIENCY ANEMIA DUE TO CHRONIC BLOOD LOSS: ICD-10-CM

## 2022-04-26 DIAGNOSIS — R91.8 LUNG FIELD ABNORMAL FINDING ON EXAMINATION: ICD-10-CM

## 2022-04-26 DIAGNOSIS — E86.0 DEHYDRATION: ICD-10-CM

## 2022-04-26 DIAGNOSIS — A41.9 SEPSIS WITH ACUTE ORGAN DYSFUNCTION, DUE TO UNSPECIFIED ORGANISM, UNSPECIFIED TYPE, UNSPECIFIED WHETHER SEPTIC SHOCK PRESENT (HCC): ICD-10-CM

## 2022-04-26 DIAGNOSIS — N28.89 URETERITIS: ICD-10-CM

## 2022-04-26 DIAGNOSIS — N76.6 VULVAR ULCERATION: ICD-10-CM

## 2022-04-26 DIAGNOSIS — E87.6 HYPOKALEMIA: ICD-10-CM

## 2022-04-26 DIAGNOSIS — F41.9 ANXIETY: ICD-10-CM

## 2022-04-26 DIAGNOSIS — R65.20 SEPSIS WITH ACUTE ORGAN DYSFUNCTION, DUE TO UNSPECIFIED ORGANISM, UNSPECIFIED TYPE, UNSPECIFIED WHETHER SEPTIC SHOCK PRESENT (HCC): ICD-10-CM

## 2022-04-26 DIAGNOSIS — N76.6 VULVAR ULCERATION: Primary | ICD-10-CM

## 2022-04-26 DIAGNOSIS — K12.1 ORAL ULCERATION: ICD-10-CM

## 2022-04-26 DIAGNOSIS — E87.1 HYPONATREMIA: ICD-10-CM

## 2022-04-26 DIAGNOSIS — Z86.19 HISTORY OF SEPSIS: ICD-10-CM

## 2022-04-26 DIAGNOSIS — N76.5 VAGINAL ULCERATION: ICD-10-CM

## 2022-04-26 PROBLEM — M79.89 SYMPTOM OF LEG SWELLING: Status: ACTIVE | Noted: 2022-04-26

## 2022-04-26 PROCEDURE — 3008F BODY MASS INDEX DOCD: CPT | Performed by: OBSTETRICS & GYNECOLOGY

## 2022-04-26 PROCEDURE — 71046 X-RAY EXAM CHEST 2 VIEWS: CPT

## 2022-04-26 PROCEDURE — 99496 TRANSJ CARE MGMT HIGH F2F 7D: CPT | Performed by: FAMILY MEDICINE

## 2022-04-26 PROCEDURE — 99215 OFFICE O/P EST HI 40 MIN: CPT | Performed by: OBSTETRICS & GYNECOLOGY

## 2022-04-26 PROCEDURE — 1111F DSCHRG MED/CURRENT MED MERGE: CPT | Performed by: FAMILY MEDICINE

## 2022-04-26 PROCEDURE — 1036F TOBACCO NON-USER: CPT | Performed by: OBSTETRICS & GYNECOLOGY

## 2022-04-26 RX ORDER — LIDOCAINE HYDROCHLORIDE 20 MG/ML
JELLY TOPICAL AS NEEDED
Qty: 30 ML | Refills: 2 | Status: SHIPPED | OUTPATIENT
Start: 2022-04-26 | End: 2022-04-26

## 2022-04-26 RX ORDER — LIDOCAINE AND PRILOCAINE 25; 25 MG/G; MG/G
CREAM TOPICAL
Qty: 30 G | Refills: 2 | Status: SHIPPED | OUTPATIENT
Start: 2022-04-26 | End: 2022-04-29 | Stop reason: CLARIF

## 2022-04-26 RX ORDER — FERROUS SULFATE TAB EC 324 MG (65 MG FE EQUIVALENT) 324 (65 FE) MG
324 TABLET DELAYED RESPONSE ORAL
Qty: 60 TABLET | Refills: 5 | Status: SHIPPED | OUTPATIENT
Start: 2022-04-26

## 2022-04-26 RX ORDER — LIDOCAINE AND PRILOCAINE 25; 25 MG/G; MG/G
CREAM TOPICAL
Qty: 30 G | Refills: 2 | Status: SHIPPED | OUTPATIENT
Start: 2022-04-26 | End: 2022-04-26

## 2022-04-26 NOTE — PROGRESS NOTES
Assessment/Plan:   - Vulvar ulcers of undetermined etiology - thought to represent a possible medication side effect by hospital providers  Not felt to be Shannon's  Review of ID note 4/21-oral lesions look like buccal ulcers with pustules on the soft palate, left lower labia with induration and tenderness but no discrete lesions, flat hives on the upper legs  No mention of Sales-Jon  Notation the next day states the rash was resolving and the labia felt better  - Different in appearance compared to the oral ulcers  I have never seen these before  Patient gave me permission to discuss her case with other physicians including the pictures to help determine an etiology  Warm soaks t i d , may try Aveeno  Lidocaine gel 2% to the affected area q i d  Stefany bottle with voiding  Explained that urine is sterile and she may void while in the tub  The tub also has a shower so she can cleanse off her torso after so  She admits to very meticulous hygiene care  Return to the office in 1 week    [address] [facility],[address]  4/17-4/22/22    "Presenting Problem/History of Present Illness  This is a 47y o  year-old female admitted on 4/17/2022 with Ureteritis [N28 89]  Sepsis, due to unspecified organism, unspecified whether acute organ dysfunction present (CMS/[facility]) [A41 9]  Hospital Course     43-year-old female with no significant past medical history presented with bilateral flank pain fever and dysuria  Patient had a CAT scan which showed ureteritis, she was seen by urology and infectious disease and started on antibiotics  Additionally during hospitalization the patient was noted to have painful labial and oral lesions  She was seen by OB/GYN, her fungal and herpes swabs were negative  Patient had ongoing fevers, noted she has had these for weeks prior to admission as well, for this reason we had rheumatology evaluate patient   Thought process was that this was likely not Behcet's or any other autoimmune etiology, there was a possible thought of a drug reaction although no, medications she has been on to cause this are identified and she has had these fevers for weeks  Her HIV and RPR were negative  Long discussion with infectious disease, recommended further work-up to include a CAT scan of her chest with IV contrast and also an echocardiogram, unfortunately the patient did not want to stay in the hospital for any additional testing  Please see problem list below under sepsis for additional hospitalization details  Instead of leaving AMA as a second best option I will discharge this patient with close outpatient follow-up "      admitting labs:  WBC 21 09 k  Hgb 11 0  Na 128  K 3 8  UA - blood, leukocytes, cloudy  Negative COVID test  Normal lactate  CT scan- ureteritis, 3 1 cm left ovarian cyst, uterine fibroids [not quantified or measured]    Today's visit took 58 minutes including review of records from [facility]     Addendum: 4/29/22  Yesterday we were able to contact an infectious disease specialist   They reviewed the chart and saw the pictures  They are unsure as to what this patient is suffering from but did recommend that she be evaluated at a wound center  Enzymatic debridement might prove beneficial   We placed a referral   I called the patient just now  I was surprised to find out that the wound center actually contacted her but disappointed had her appointment was not until May 18th  She never was able to get the lidocaine either  There was a problem at the pharmacy  She was told they did not have time to call me  I remember receiving information that the original prescription needed to be changed to an alternative which I agreed to  I thought the pharmacy should have an obligation to contact me if there was difficulty with filling the prescription  She is in significant discomfort    I sent the prescription again to a local pharmacy in [address] she call the [address] [facility],[address] and ask to be seen Monday  She might need to choose a site that takes longer to travel to  If she receives any push back I would ask them to review her visit with me and the pictures from 4/26  Diagnoses and all orders for this visit:    Vulvar ulceration  -     Discontinue: lidocaine (XYLOCAINE) 2 % topical gel; Apply topically as needed for mild pain    Oral ulceration    Ureteritis    History of sepsis    Dehydration    Hyponatremia              Subjective:        Patient ID: Dimitris Tnioco is a 47 y o  female  47 yr old F presents  for evaluation of vaginal ulcers  On April 13th she noted fever to 103  She was evaluated at an urgent care  Screening for the flu and COVID was negative  The following day she developed back pain  The fever persisted  On April 16th she felt worse and could not get out of bed  She had myalgias as well  She again went to an urgent care  A flu screen again was negative  She was prescribed a muscle relaxant for spasms and told to take Motrin  She had bilateral flank pain  Dysuria began  She developed hallucinations which she attributed to the anti spasmodic and was discontinued  Following day on Sunday the 17th she could not get out of bed  A temperature of a 103° was present this entire time  She was taken to [facility] by ambulance  When the squad came to her home she was found to be hypotensive  She was admitted with a diagnosis of sepsis, dehydration and hyponatremia  She was hospitalized for 6 days  A CT scan revealed ureteritis and a left ovarian cyst   She received IV vancomycin and rocephin and oral doxycycline  On the 19th, hospital day 3 she developed ulcers on her labia and in her mouth  They were painful  Her fever persisted  She was given Toradol for back pain  Upon her insistence she was discharged on the 22nd  Her temperature at that time was 100°  She had wanted her to stay for additional testing    She was seen by both Gynecology, Urology, rheumatology, and Infectious Disease  No one was able to make a diagnosis of the labial ulcers  The Gynecologist said that she had never seen anything like them before  She was finally afebrile on   Her maximum temperature yesterday was 99°    Today, the patient states that her mouth ulcers have improved with just salt water rinses though she is still unable to eat without discomfort  Her vaginal ulcers have worsened and complains of discharge now  States she is soaking through panties and has had to wear a liner  Complains of pain with urination from the urine touching her labia  She has not tried any remedies and she was not prescribed any ointment for pain relief at discharge  Pt saw her family physician today and genital cultures were collected  She was given repeat labs for chlamydia and gonorrhea however her GC/Chlamydia results were negative in the hospital      In addition, pt states that on  she had vaginal bleeding that lasted 2 days  She is unsure if it was the ulcers bleeding or her period  States her period usually lasts 7 days and is heavier than this episode  She is not sexually active at the moment and has been with her partner for 6 months  Last sexual intercourse was 1 year ago  Pt is not currently working and is able to stay home until she feels better  She lives in [address] [place] [address] but is able to follow up as needed for current problem          The following portions of the patient's history were reviewed and updated as appropriate: She  has a past medical history of Allergic rhinitis, Anxiety, Arthralgia of multiple sites, Back pain, Breast nodule, Bronchitis, Cellulitis and abscess of leg, Chronic pain disorder, Common migraine without aura, Depression, Eczema, Fatigue, Forceps delivery,  4 para 4, H/O molar pregnancy, antepartum, Herpes zoster, Iron deficiency anemia, Irregular menses, Myalgia, Obesity, Pain in left shoulder, Prior pregnancy with fetal demise, Right shoulder pain, Sebaceous cyst, Sinusitis, Tinea corporis, Trichomoniasis, Vaginitis, Vitamin D deficiency, and Yeast UTI  Patient Active Problem List    Diagnosis Date Noted    Vulvar ulceration 04/26/2022    History of sepsis 04/26/2022    Oral ulceration 04/26/2022    Sepsis ([facility] ) 04/25/2022    Ureteritis 04/25/2022    Hypokalemia 04/25/2022    Anxiety 08/16/2021    Other insomnia 08/16/2021    Epigastric pain 08/12/2021    Skin lesion of left leg 05/20/2021    Hot flashes 05/20/2021    Dysmenorrhea 07/20/2020    Mid back pain     Mass of sternum 05/08/2019    Cervical radiculopathy 10/31/2018    Abnormal weight gain 07/24/2018    Chronic right-sided thoracic back pain 06/15/2018    Sebaceous cyst of labia 01/30/2018    Arthralgia of multiple sites 01/12/2018    Myofascial pain syndrome 01/12/2018    Severe obesity (BMI 35 0-35 9 with comorbidity) ([facility] ) 01/12/2018    Iron deficiency anemia due to chronic blood loss 06/08/2016    Vitamin D deficiency 06/08/2016    Menorrhagia 06/08/2015    Common migraine without aura 08/21/2013    Allergic rhinitis 01/29/2013    Depression with anxiety 01/29/2013     She  has a past surgical history that includes Appendectomy; Ovary surgery; Dilation and curettage of uterus; LAPAROSCOPY; Tonsillectomy; Tubal ligation; Ventral hernia repair; Adenoidectomy; and Oophorectomy (Right)  Her family history includes COPD in her father; Diabetes in her maternal aunt and maternal grandmother; Lung cancer (age of onset: 76) in her father; No Known Problems in her daughter, half-sister, half-sister, maternal aunt, maternal aunt, maternal aunt, maternal aunt, maternal grandfather, mother, and paternal aunt  She  reports that she has never smoked  She has never used smokeless tobacco  She reports that she does not drink alcohol and does not use drugs    Current Outpatient Medications   Medication Sig Dispense Refill    cetirizine (ZYRTEC ALLERGY) 10 mg tablet Take 10 mg by mouth as needed   (Patient not taking: Reported on 4/26/2022 )      ferrous sulfate 324 (65 Fe) mg Take 1 tablet (324 mg total) by mouth 2 (two) times a day before meals (Patient not taking: Reported on 4/26/2022 ) 60 tablet 5    lidocaine-prilocaine (EMLA) cream Please specify directions, refills and quantity 30 g 2    multivitamin (THERAGRAN) TABS Take 1 tablet by mouth daily (Patient not taking: Reported on 4/26/2022 )       No current facility-administered medications for this visit  Current Outpatient Medications on File Prior to Visit   Medication Sig    cetirizine (ZYRTEC ALLERGY) 10 mg tablet Take 10 mg by mouth as needed   (Patient not taking: Reported on 4/26/2022 )    ferrous sulfate 324 (65 Fe) mg Take 1 tablet (324 mg total) by mouth 2 (two) times a day before meals (Patient not taking: Reported on 4/26/2022 )    multivitamin (THERAGRAN) TABS Take 1 tablet by mouth daily (Patient not taking: Reported on 4/26/2022 )    [DISCONTINUED] busPIRone (BUSPAR) 15 mg tablet Take 1 tablet (15 mg total) by mouth 2 (two) times a day (Patient not taking: Reported on 4/26/2022 )    [DISCONTINUED] omeprazole (PriLOSEC) 40 MG capsule Take 1 capsule (40 mg total) by mouth 2 (two) times a day for 14 days (to be taken 30 minutes before breakfast and 30 minutes before dinner)   [DISCONTINUED] traZODone (DESYREL) 50 mg tablet Take 1 tablet (50 mg total) by mouth daily at bedtime (Patient not taking: Reported on 4/26/2022 )     No current facility-administered medications on file prior to visit  She is allergic to red dye - food allergy, clindamycin, tizanidine, latex, mold extract [trichophyton], and penicillins       Review of Systems   Constitutional: Negative for chills and fever  Respiratory: Negative for shortness of breath  Cardiovascular: Negative for chest pain  Gastrointestinal: Negative for abdominal pain and vomiting     Genitourinary: Positive for genital sores, vaginal discharge and vaginal pain  Negative for decreased urine volume, dyspareunia, dysuria, flank pain, frequency, hematuria, menstrual problem, urgency and vaginal bleeding  Skin: Negative for color change and rash  Psychiatric/Behavioral: The patient is nervous/anxious  All other systems reviewed and are negative  Objective:    Vitals:    04/26/22 1441   BP: 118/74   BP Location: Left arm   Patient Position: Sitting   Cuff Size: Standard   Weight: 78 2 kg (172 lb 6 4 oz)   Height: 5' 1" (1 549 m)            Physical Exam  Constitutional:       Appearance: Normal appearance  HENT:      Head: Normocephalic and atraumatic  Eyes:      General: No scleral icterus  Right eye: No discharge  Left eye: No discharge  Extraocular Movements: Extraocular movements intact  Cardiovascular:      Rate and Rhythm: Normal rate and regular rhythm  Heart sounds: Normal heart sounds  Pulmonary:      Effort: Pulmonary effort is normal       Breath sounds: Normal breath sounds  Abdominal:      General: Abdomen is flat  There is no distension  Palpations: Abdomen is soft  Tenderness: There is no abdominal tenderness  There is right CVA tenderness  There is no left CVA tenderness  Comments: Status post abdominal plasty   Genitourinary:     Comments: Large area of vulvar ulceration with desquamation involving the upper labia minora, and lower labia minora and perineum  The labia majora is spared  What look like to piece of soiled gauze was actually a piece of mucosa  Musculoskeletal:      Right lower leg: No edema  Left lower leg: No edema  Skin:     General: Skin is warm and dry  Comments: A few clean deep oral ulcers without surrounding erythema or discharge  Neurological:      General: No focal deficit present  Mental Status: She is alert     Psychiatric:         Mood and Affect: Mood normal          Behavior: Behavior normal  Thought Content:  Thought content normal          Judgment: Judgment normal

## 2022-04-26 NOTE — PROGRESS NOTES
Chief Complaint   Patient presents with    Transition of Care Management     kidney infection    Nail Problem    Leg Swelling     bilateral      Health Maintenance   Topic Date Due    Hepatitis C Screening  Never done    COVID-19 Vaccine (1) Never done    HIV Screening  Never done    Cervical Cancer Screening  01/30/2021    Annual Physical  07/02/2021    Influenza Vaccine (1) 09/01/2021    BMI: Followup Plan  05/20/2022    Breast Cancer Screening: Mammogram  01/25/2023    BMI: Adult  04/26/2023    Colorectal Cancer Screening  09/12/2026    DTaP,Tdap,and Td Vaccines (2 - Td or Tdap) 07/02/2030    Pneumococcal Vaccine: Pediatrics (0 to 5 Years) and At-Risk Patients (6 to 59 Years)  Aged Out    HIB Vaccine  Aged Out    Hepatitis B Vaccine  Aged Out    IPV Vaccine  Aged Out    Hepatitis A Vaccine  Aged Out    Meningococcal ACWY Vaccine  Aged Out    HPV Vaccine  Aged Out     Assessment/Plan:     Patient presents for TCM visit  She  was hospitalized 4/17/22- 4/22/22 at [facility] in [place], [place] for sepsis, ureteritis due to nonspecific organism  Sepsis ([address] )  Patient was treated with antibiotics  Patient states that she developed adverse reaction to Abx , developed rash, bruising  Had vever last night  She is afebrile on exam     Blood work done on April 22, 2021 showed WBC 17 79 thousands  Check CBC with dif  Recommended to call office if spikes fever  Ureteritis  CT of the abdomen done on April 17, 2021 showed bilateral ureteritis, R > L  Radiologist recommended to repeat CT scan in 4 weeks  Recommended to stay well hydrated  Check U/A and urine culture  Schedule follow-up visit with urology  Hypokalemia  Potassium was replaced during hospitalization  Recommended to keep sufficient dietary potassium intake  Check CMP  Iron deficiency anemia due to chronic blood loss  Patient reports heavy menstrual cycles  Recheck CBC with dif    Start Ferrous sulfate 325 mg twice daily  Schedule appointment with gynecology  Lung field abnormal finding on examination  Decreased breath sounds at the lung bases on exam   Will order chest x-ray to rule out pleural effusions  Vaginal ulceration  Will check genital comprehensive culture  Patient is not sexually active  STD testing including HIV - negative  Recommended to schedule appointment with gynecology  Discussed perianal hygiene  Anxiety  Mood has been stable  Patient does not take any medication for anxiety currently  Symptom of leg swelling  Recommended to follow a low-sodium diet, avoid processed foods  Advised to go to the ER if develops SOB, worsening leg edema  Schedule follow-up visit in 2 weeks  Diagnoses and all orders for this visit:    Sepsis with acute organ dysfunction, due to unspecified organism, unspecified type, unspecified whether septic shock present [facility])  -     Ambulatory Referral to Urology; Future    Ureteritis  -     CT abdomen pelvis w contrast; Future  -     Urinalysis with microscopic  -     Urine culture  -     Comprehensive metabolic panel; Future  -     Ambulatory Referral to Urology; Future    Hypokalemia  -     Comprehensive metabolic panel; Future    Iron deficiency anemia due to chronic blood loss  -     CBC and differential; Future  -     ferrous sulfate 324 (65 Fe) mg; Take 1 tablet (324 mg total) by mouth 2 (two) times a day before meals (Patient not taking: Reported on 4/26/2022 )    Lung field abnormal finding on examination  -     XR chest pa & lateral; Future    Vaginal ulceration  -     Cancel: Genital Comprehensive Culture; Future  -     Ambulatory Referral to Gynecology; Future  -     Cancel: Genital Comprehensive Culture  -     Genital Comprehensive Culture    Anxiety         Subjective:     Patient ID: Wayne Chen is a 47 y o  female  HPI     Patient presents for TCM visit      Patient was hospitalized 4/17/22- 4/22/22 at [facility] in [place] , [place] for sepsis, ureteritis due to nonspecific organism  Reviewed hospital records, test results, discharge medications with patient  Patient states that she had complicated hospital course, had adverse reaction to Abx,  was noted to have vaginal ulcers, mouth sores  STD testing including HIV - negative  Patient is not sexually active  She was seen by gynecology during hospital stay  CT of the abdomen done on April 17, 2021 showed bilateral ureteritis, R > L  Radiologist recommended to repeat CT scan in 4 weeks  Blood work done on April 22, 2021 showed WBC 17 79 thousands, Hb 9 7  Potassium 3 1, creatinine 0 6  Patient feels tired, had fever last nigh  Reports no back pain  Reports no back pain/ flank pain  No bllod in urine  C/o some burning on urination  C/o vaginal ulcers with some drainage  C/o bilateral leg swelling  No SOB  No chest pain  C/o bruises on legs which she developed with Abx  Patient has history of anemia secondary to heavy menstrual cycles  Review of Systems   Constitutional: Positive for fatigue and fever (had fever last night)  Negative for activity change, appetite change and chills  HENT: Negative for congestion, ear pain, sore throat and trouble swallowing  Eyes: Negative  Respiratory: Negative for cough, chest tightness, shortness of breath and wheezing  Cardiovascular: Positive for leg swelling  Negative for chest pain and palpitations  Gastrointestinal: Negative for abdominal pain, blood in stool, constipation, diarrhea, nausea and vomiting  Genitourinary: Positive for dysuria  Negative for difficulty urinating, flank pain and hematuria  Musculoskeletal: Negative for back pain and joint swelling  Skin:        Bruises on legs   Neurological: Negative for dizziness, syncope and headaches  Hematological: Negative for adenopathy  Bruises/bleeds easily  Psychiatric/Behavioral: Negative for dysphoric mood          Anxiety Objective:     Physical Exam  Vitals and nursing note reviewed  Constitutional:       Appearance: Normal appearance  She is obese  She is not toxic-appearing  HENT:      Head: Normocephalic and atraumatic  Cardiovascular:      Rate and Rhythm: Normal rate and regular rhythm  Heart sounds: No murmur heard  Comments: BL LE edematous  No calf tenderness BL  Pulmonary:      Effort: Pulmonary effort is normal       Breath sounds: No rales  Comments: Decreased breath sounds at the lung bases  Abdominal:      General: Bowel sounds are normal  There is no distension  Palpations: Abdomen is soft  Tenderness: There is no abdominal tenderness  There is no right CVA tenderness or left CVA tenderness  Genitourinary:     Comments: There is open ulcer on left labia majora with white discharge  Musculoskeletal:         General: No swelling or tenderness  Normal range of motion  Cervical back: Normal range of motion and neck supple  Skin:     General: Skin is warm and dry  Findings: No rash  Comments: Small ecchymotic areas on legs   Neurological:      Mental Status: She is alert  Psychiatric:         Mood and Affect: Mood normal            Vitals:    04/26/22 0821 04/26/22 0843   BP: 118/70    BP Location: Left arm    Patient Position: Sitting    Pulse: 63    Resp: 16    Temp: 97 9 °F (36 6 °C)    SpO2:  97%   Weight: 77 5 kg (170 lb 12 8 oz)    Height: 5' (1 524 m)        Transitional Care Management Review:  Kinjal Quiñonez is a 47 y o  female here for TCM follow up       During the TCM phone call patient stated:    TCM Call (since 3/26/2022)     Date and time call was made  4/25/2022 12:18 PM    Patient was hospitialized at  Other (comment)        [address] [place] [address] Unit    Date of Admission  04/17/22    Date of discharge  04/25/22    Diagnosis  Ureteritis sepsis - unspecified organism     Disposition  Home    Were the patients medications reviewed and updated  No    Current Symptoms  None      TCM Call (since 3/26/2022)     Post hospital issues  None    Should patient be enrolled in anticoag monitoring? No    Scheduled for follow up?   Yes    Did you obtain your prescribed medications  Yes    Do you need help managing your prescriptions or medications  No    Is transportation to your appointment needed  No    I have advised the patient to call PCP with any new or worsening symptoms  [facility] 71 members    Support System  Family    The type of support provided  Physical; Emotional    Do you have social support  Yes, some    Are you recieving any outpatient services  No    Are you recieving home care services  No    Are you using any community resources  No    Current waiver services  No    Have you fallen in the last 12 months  No    Interperter language line needed  No    Counseling  Patient          Laron Mesa MD

## 2022-04-27 NOTE — ASSESSMENT & PLAN NOTE
CT of the abdomen done on April 17, 2021 showed bilateral ureteritis, R > L  Radiologist recommended to repeat CT scan in 4 weeks  Recommended to stay well hydrated  Check U/A and urine culture  Schedule follow-up visit with urology

## 2022-04-27 NOTE — ASSESSMENT & PLAN NOTE
Decreased breath sounds at the lung bases on exam   Will order chest x-ray to rule out pleural effusions

## 2022-04-27 NOTE — ASSESSMENT & PLAN NOTE
Will check genital comprehensive culture  Patient is not sexually active  STD testing including HIV - negative  Recommended to schedule appointment with gynecology  Discussed perianal hygiene

## 2022-04-27 NOTE — ASSESSMENT & PLAN NOTE
Recommended to follow a low-sodium diet, avoid processed foods  Advised to go to the ER if develops SOB, worsening leg edema

## 2022-04-27 NOTE — ASSESSMENT & PLAN NOTE
Potassium was replaced during hospitalization  Recommended to keep sufficient dietary potassium intake  Check CMP

## 2022-04-27 NOTE — ASSESSMENT & PLAN NOTE
Patient reports heavy menstrual cycles  Recheck CBC with dif  Start Ferrous sulfate 325 mg twice daily  Schedule appointment with gynecology

## 2022-04-27 NOTE — ASSESSMENT & PLAN NOTE
Patient was treated with antibiotics  Patient states that she developed adverse reaction to Abx , developed rash, bruising  Had vever last night  She is afebrile on exam     Blood work done on April 22, 2021 showed WBC 17 79 thousands  Check CBC with dif  Recommended to call office if spikes fever

## 2022-04-29 DIAGNOSIS — N76.6 VULVAR ULCERATION: ICD-10-CM

## 2022-04-29 DIAGNOSIS — Z86.19 HISTORY OF SEPSIS: ICD-10-CM

## 2022-04-29 RX ORDER — LIDOCAINE AND PRILOCAINE 25; 25 MG/G; MG/G
CREAM TOPICAL
Qty: 30 G | Refills: 2 | Status: SHIPPED | OUTPATIENT
Start: 2022-04-29

## 2022-04-29 RX ORDER — LIDOCAINE HYDROCHLORIDE 20 MG/ML
JELLY TOPICAL AS NEEDED
Qty: 30 ML | Refills: 2 | Status: SHIPPED | OUTPATIENT
Start: 2022-04-29 | End: 2022-04-29

## 2022-05-04 ENCOUNTER — OFFICE VISIT (OUTPATIENT)
Dept: WOUND CARE | Facility: CLINIC | Age: 52
End: 2022-05-04
Payer: COMMERCIAL

## 2022-05-04 VITALS
DIASTOLIC BLOOD PRESSURE: 80 MMHG | SYSTOLIC BLOOD PRESSURE: 110 MMHG | WEIGHT: 170 LBS | HEART RATE: 84 BPM | TEMPERATURE: 96.4 F | BODY MASS INDEX: 33.38 KG/M2 | RESPIRATION RATE: 18 BRPM | HEIGHT: 60 IN

## 2022-05-04 DIAGNOSIS — N76.6 VULVAR ULCERATION: Primary | ICD-10-CM

## 2022-05-04 PROCEDURE — 99203 OFFICE O/P NEW LOW 30 MIN: CPT | Performed by: FAMILY MEDICINE

## 2022-05-04 PROCEDURE — 97597 DBRDMT OPN WND 1ST 20 CM/<: CPT | Performed by: FAMILY MEDICINE

## 2022-05-04 PROCEDURE — 99213 OFFICE O/P EST LOW 20 MIN: CPT | Performed by: FAMILY MEDICINE

## 2022-05-04 RX ORDER — LIDOCAINE 40 MG/G
CREAM TOPICAL ONCE
Status: COMPLETED | OUTPATIENT
Start: 2022-05-04 | End: 2022-05-04

## 2022-05-04 RX ADMIN — LIDOCAINE: 40 CREAM TOPICAL at 08:52

## 2022-05-04 NOTE — PROGRESS NOTES
Assessment/Plan:  Marked improvement in vulvar ulceration  Seen by wound therapy yesterday  Continue present course, return to the office in 2 weeks  Warm soaks b i d , EMLA p r n  Diagnoses and all orders for this visit:    Vulvar ulceration    History of sepsis    Oral ulceration    Ureteritis    Vaginal ulceration  -     Ambulatory Referral to Gynecology              Subjective:        Patient ID: Rakesh Dickerson is a 47 y o  female  Hennie Shallow returns for follow-up  She has noted a marked improvement but still is in a significant amount of discomfort  She soaks twice a day and continues to use a Stefany bottle  She will need a new prescription for EMLA  She did see a wound specialist yesterday  He also was not sure what could have caused this  He debrided some tissue  I thanked him through epic  He thought a dermatology visit might be in order for a biopsy  On  I asked a 2nd infectious disease specialist her opinion  She too did not have a diagnosis  When seen yesterday by the dentist she had ulcers in the posterior mouth  He felt this represented a viral syndrome  She continues to require EMLA and performs soaks twice daily  She uses a Stefany bottle to dilute the urine  She was very appreciative that we were able to move up her appointment to wound management        The following portions of the patient's history were reviewed and updated as appropriate: She  has a past medical history of Allergic rhinitis, Anxiety, Arthralgia of multiple sites, Back pain, Breast nodule, Bronchitis, Cellulitis and abscess of leg, Chronic pain disorder, Common migraine without aura, Depression, Eczema, Fatigue, Forceps delivery,  4 para 4, H/O molar pregnancy, antepartum, Herpes zoster, Iron deficiency anemia, Irregular menses, Myalgia, Obesity, Pain in left shoulder, Prior pregnancy with fetal demise, Right shoulder pain, Sebaceous cyst, Sinusitis, Tinea corporis, Trichomoniasis, Vaginitis, Vitamin D deficiency, and Yeast UTI  Patient Active Problem List    Diagnosis Date Noted    Vulvar ulceration 04/26/2022    History of sepsis 04/26/2022    Oral ulceration 04/26/2022    Lung field abnormal finding on examination 04/26/2022    Vaginal ulceration 04/26/2022    Symptom of leg swelling 04/26/2022    Sepsis ([facility] ) 04/25/2022    Ureteritis 04/25/2022    Hypokalemia 04/25/2022    Anxiety 08/16/2021    Other insomnia 08/16/2021    Epigastric pain 08/12/2021    Skin lesion of left leg 05/20/2021    Hot flashes 05/20/2021    Dysmenorrhea 07/20/2020    Mid back pain     Mass of sternum 05/08/2019    Cervical radiculopathy 10/31/2018    Abnormal weight gain 07/24/2018    Chronic right-sided thoracic back pain 06/15/2018    Sebaceous cyst of labia 01/30/2018    Arthralgia of multiple sites 01/12/2018    Myofascial pain syndrome 01/12/2018    Severe obesity (BMI 35 0-35 9 with comorbidity) ([facility] ) 01/12/2018    Iron deficiency anemia due to chronic blood loss 06/08/2016    Vitamin D deficiency 06/08/2016    Menorrhagia 06/08/2015    Common migraine without aura 08/21/2013    Allergic rhinitis 01/29/2013    Depression with anxiety 01/29/2013     She  has a past surgical history that includes Appendectomy; Ovary surgery; Dilation and curettage of uterus; LAPAROSCOPY; Tonsillectomy; Tubal ligation; Ventral hernia repair; Adenoidectomy; and Oophorectomy (Right)  Her family history includes COPD in her father; Diabetes in her maternal aunt and maternal grandmother; Lung cancer (age of onset: 76) in her father; No Known Problems in her daughter, half-sister, half-sister, maternal aunt, maternal aunt, maternal aunt, maternal aunt, maternal grandfather, mother, and paternal aunt  She  reports that she has never smoked  She has never used smokeless tobacco  She reports that she does not drink alcohol and does not use drugs    Current Outpatient Medications   Medication Sig Dispense Refill    cetirizine (ZYRTEC ALLERGY) 10 mg tablet Take 10 mg by mouth as needed        ferrous sulfate 324 (65 Fe) mg Take 1 tablet (324 mg total) by mouth 2 (two) times a day before meals (Patient not taking: Reported on 4/26/2022 ) 60 tablet 5    lidocaine-prilocaine (EMLA) cream Please specify directions, refills and quantity 30 g 2    lidocaine-prilocaine (EMLA) cream Apply topically as needed for mild pain 30 g 3    multivitamin (THERAGRAN) TABS Take 1 tablet by mouth daily         No current facility-administered medications for this visit  Current Outpatient Medications on File Prior to Visit   Medication Sig    cetirizine (ZYRTEC ALLERGY) 10 mg tablet Take 10 mg by mouth as needed      ferrous sulfate 324 (65 Fe) mg Take 1 tablet (324 mg total) by mouth 2 (two) times a day before meals (Patient not taking: Reported on 4/26/2022 )    lidocaine-prilocaine (EMLA) cream Please specify directions, refills and quantity    lidocaine-prilocaine (EMLA) cream Apply topically as needed for mild pain    multivitamin (THERAGRAN) TABS Take 1 tablet by mouth daily       No current facility-administered medications on file prior to visit  She is allergic to red dye - food allergy, clindamycin, tizanidine, latex, mold extract [trichophyton], and penicillins       Review of Systems   Constitutional: Negative  Negative for chills and fever  Respiratory: Negative for shortness of breath  Cardiovascular: Negative for chest pain  Gastrointestinal: Negative for abdominal pain  Genitourinary: Positive for dysuria, vaginal discharge and vaginal pain  Negative for decreased urine volume, difficulty urinating and vaginal bleeding  Skin: Positive for wound  Negative for rash  Hematological: Negative for adenopathy  Objective:    Vitals:    05/05/22 1110   BP: 116/68   Weight: 73 7 kg (162 lb 6 4 oz)            Physical Exam  Vitals and nursing note reviewed  Exam conducted with a chaperone present  Constitutional:       Appearance: Normal appearance  Eyes:      General: No scleral icterus  Right eye: No discharge  Left eye: No discharge  Extraocular Movements: Extraocular movements intact  Pulmonary:      Effort: Pulmonary effort is normal  No respiratory distress  Genitourinary:     Comments: Marked improvement verses last week  The majority of the defect has no membrane  It does not appear to be infected  It is   The area is too tender to attempt a vaginal inspection  Skin:     General: Skin is warm and dry  Neurological:      Mental Status: She is alert and oriented to person, place, and time  Psychiatric:         Mood and Affect: Mood normal          Behavior: Behavior normal          Thought Content:  Thought content normal          Judgment: Judgment normal

## 2022-05-04 NOTE — PROGRESS NOTES
Patient ID: Lorne Leyden is a 47 y o  female Date of Birth 1970       Chief Complaint   Patient presents with    New Patient Visit     vulvar wound       Allergies:  Red dye - food allergy, Clindamycin, Tizanidine, Latex, Mold extract [trichophyton], and Penicillins    Diagnosis:      Diagnosis ICD-10-CM Associated Orders   1  Vulvar ulceration  N76 6 lidocaine (LMX) 4 % cream     Wound cleansing and dressings     Wound off loading     Debridement           Assessment & Plan:   Multiple vulvar ulcers of unknown etiology  Possible medication reaction since the oral ulcers and the vulvar ulcers started while in the hospital   Possible viral etiology  Overall, the ulcers have significantly improved compared to photos that I reviewed from last week  Because of this, she will continue using A&D ointment since that helps to soothe and helps the burning while urinating  I excised some necrotic tissue   Follow up with her GYN and primary care as already scheduled  I will see her next week in follow-up   If things were not to improve, I would recommend dermatology consultation for possible biopsy  Subjective:   5/4/22:  1st visit for this 71-year-old female referred to the wound center because of vulvar ulcerations  Below is a complete summary of her recent history and problem  This is from her GYN     ""47 yr old F presents  for evaluation of vaginal ulcers  On April 13th she noted fever to 103  She was evaluated at an urgent care  Screening for the flu and COVID was negative  The following day she developed back pain  The fever persisted  On April 16th she felt worse and could not get out of bed  She had myalgias as well  She again went to an urgent care  A flu screen again was negative  She was prescribed a muscle relaxant for spasms and told to take Motrin  She had bilateral flank pain  Dysuria began   She developed hallucinations which she attributed to the anti spasmodic and was discontinued  Following day on Sunday the 17th she could not get out of bed  A temperature of a 103° was present this entire time  She was taken to [facility] by ambulance  When the squad came to her home she was found to be hypotensive  She was admitted with a diagnosis of sepsis, dehydration and hyponatremia  She was hospitalized for 6 days  A CT scan revealed ureteritis and a left ovarian cyst   She received IV vancomycin and rocephin and oral doxycycline      On the 19th, hospital day 3 she developed ulcers on her labia and in her mouth  They were painful  Her fever persisted  She was given Toradol for back pain  Upon her insistence she was discharged on the 22nd  Her temperature at that time was 100°  She had wanted her to stay for additional testing  She was seen by both Gynecology, Urology, rheumatology, and Infectious Disease  No one was able to make a diagnosis of the labial ulcers  The Gynecologist said that she had never seen anything like them before  She was finally afebrile on April 25th  Her maximum temperature yesterday was 99°     Today, the patient states that her mouth ulcers have improved with just salt water rinses though she is still unable to eat without discomfort  Her vaginal ulcers have worsened and complains of discharge now  States she is soaking through panties and has had to wear a liner  Complains of pain with urination from the urine touching her labia  She has not tried any remedies and she was not prescribed any ointment for pain relief at discharge  Pt saw her family physician today and genital cultures were collected  She was given repeat labs for chlamydia and gonorrhea however her GC/Chlamydia results were negative in the hospital       In addition, pt states that on 4/17 she had vaginal bleeding that lasted 2 days  She is unsure if it was the ulcers bleeding or her period  States her period usually lasts 7 days and is heavier than this episode  She is not sexually active at the moment and has been with her partner for 6 months  Last sexual intercourse was 1 year ago ""    Patient states that her fevers finally stopped about 3-4 days ago  She has generally feeling better but fatigued  She has returnedto work but only could do 4 hours at a time because of pain in the vulvar area  Her main complaint is burning of the area with urination  She has manage to address this with spreading the labia while urinating or soaking in water and urinating  She did notice a rather large amount of what she described as pus that drained  She was not sure what this actually was  This occured a day or two ago  She felt better  While hospitalized, all cultures including fungal, bacterial, and herpes were negative  Urine cultures were negative  Rheumatology felt that this was not Bechet's syndrome  Oral ulcers and labial ulcers all developed after she was admitted to the hospital   They have improved significantly  The oral ulcers are almost completely gone  Past medical history is otherwise unremarkable        The following portions of the patient's history were reviewed and updated as appropriate:   Patient Active Problem List   Diagnosis    Sebaceous cyst of labia    Allergic rhinitis    Arthralgia of multiple sites    Common migraine without aura    Depression with anxiety    Iron deficiency anemia due to chronic blood loss    Menorrhagia    Myofascial pain syndrome    Severe obesity (BMI 35 0-35 9 with comorbidity) ([address] )    Vitamin D deficiency    Chronic right-sided thoracic back pain    Abnormal weight gain    Cervical radiculopathy    Mass of sternum    Mid back pain    Dysmenorrhea    Skin lesion of left leg    Hot flashes    Epigastric pain    Anxiety    Other insomnia    Sepsis (HCC)    Ureteritis    Hypokalemia    Vulvar ulceration    History of sepsis    Oral ulceration    Lung field abnormal finding on examination    Vaginal ulceration    Symptom of leg swelling     Past Medical History:   Diagnosis Date    Allergic rhinitis     Anxiety     Arthralgia of multiple sites     Back pain     RIGHT LOW BACK PAIN   RESOLVED  [id number]  UPPER BACK PAIN RESOLVED [id number]    Breast nodule     Bronchitis     Cellulitis and abscess of leg     RESOLVED 2015    Chronic pain disorder     Common migraine without aura     Depression     Eczema     Fatigue     Forceps delivery      4 para 4     H/O molar pregnancy, antepartum     Herpes zoster     [id number] RESOLVED    Iron deficiency anemia     Irregular menses     Myalgia     Obesity     Pain in left shoulder     Prior pregnancy with fetal demise     Right shoulder pain     Sebaceous cyst     Sinusitis     Tinea corporis     Trichomoniasis     Vaginitis     Vitamin D deficiency     Yeast UTI      Past Surgical History:   Procedure Laterality Date    ADENOIDECTOMY      APPENDECTOMY      DILATION AND CURETTAGE OF UTERUS      LAPAROSCOPY      OOPHORECTOMY Right     age 28    OVARY SURGERY      TONSILLECTOMY      OVER AGE 16    TUBAL LIGATION      VENTRAL HERNIA REPAIR       Family History   Problem Relation Age of Onset    COPD Father     Lung cancer Father 76    Diabetes Maternal Grandmother     Diabetes Maternal Aunt     No Known Problems Mother     No Known Problems Daughter     No Known Problems Maternal Grandfather     No Known Problems Half-Sister     No Known Problems Half-Sister     No Known Problems Maternal Aunt     No Known Problems Maternal Aunt     No Known Problems Maternal Aunt     No Known Problems Maternal Aunt     No Known Problems Paternal Aunt     Hypertension Neg Hx     Heart disease Neg Hx     Stroke Neg Hx     Thyroid disease Neg Hx       Social History     Socioeconomic History    Marital status:      Spouse name: None    Number of children: 3    Years of education: None    Highest education level: Master's degree (e g , MA, MS, Barry, MEd, MSW, MAYTE)   Occupational History    Occupation: conselor/ director   Tobacco Use    Smoking status: Never Smoker    Smokeless tobacco: Never Used   Vaping Use    Vaping Use: Never used   Substance and Sexual Activity    Alcohol use: No    Drug use: No    Sexual activity: Yes     Partners: Male     Birth control/protection: Female Sterilization   Other Topics Concern    None   Social History Narrative    COFFEE DRINKER    LACK OF EXERCISE    Lives with 2 children     Social Determinants of Health     Financial Resource Strain: Not on file   Food Insecurity: Not on file   Transportation Needs: Not on file   Physical Activity: Not on file   Stress: Not on file   Social Connections: Not on file   Intimate Partner Violence: Not on file   Housing Stability: Not on file        Current Outpatient Medications:     cetirizine (ZYRTEC ALLERGY) 10 mg tablet, Take 10 mg by mouth as needed  , Disp: , Rfl:     lidocaine-prilocaine (EMLA) cream, Please specify directions, refills and quantity, Disp: 30 g, Rfl: 2    lidocaine-prilocaine (EMLA) cream, Apply topically as needed for mild pain, Disp: 30 g, Rfl: 3    multivitamin (THERAGRAN) TABS, Take 1 tablet by mouth daily  , Disp: , Rfl:     ferrous sulfate 324 (65 Fe) mg, Take 1 tablet (324 mg total) by mouth 2 (two) times a day before meals (Patient not taking: Reported on 4/26/2022 ), Disp: 60 tablet, Rfl: 5  No current facility-administered medications for this visit  Review of Systems   Constitutional: Positive for fatigue  Negative for appetite change, chills, fever and unexpected weight change  HENT: Negative for congestion, hearing loss, postnasal drip and sinus pressure  Oral ulcers   Eyes: Negative for discharge and visual disturbance  Respiratory: Negative for cough and shortness of breath  Cardiovascular: Negative for chest pain, palpitations and leg swelling     Gastrointestinal: Negative for abdominal pain, blood in stool, constipation, diarrhea and nausea  Endocrine: Negative  Genitourinary: Positive for genital sores  Negative for difficulty urinating, dysuria and urgency  Labial ulcers   Musculoskeletal: Negative for back pain and gait problem  Skin: Positive for wound (External genitalia)  Negative for rash  Allergic/Immunologic: Negative  Neurological: Negative for dizziness, tremors, seizures, weakness, numbness and headaches  Hematological: Does not bruise/bleed easily  Psychiatric/Behavioral: Negative  Negative for dysphoric mood  The patient is not nervous/anxious  Objective:  /80   Pulse 84   Temp (!) 96 4 °F (35 8 °C)   Resp 18   Ht 5' (1 524 m)   Wt 77 1 kg (170 lb)   BMI 33 20 kg/m²   Pain Score:   7     Physical Exam  Vitals and nursing note reviewed  Exam conducted with a chaperone present  Constitutional:       Appearance: Normal appearance  She is well-developed and overweight  HENT:      Head: Normocephalic and atraumatic  Right Ear: External ear normal       Left Ear: External ear normal       Mouth/Throat:      Mouth: Oral lesions (One ulceration on the left buccal area) present  Dentition: Normal dentition  Comments: masked  Eyes:      General: Lids are normal          Right eye: No discharge  Left eye: No discharge  Conjunctiva/sclera: Conjunctivae normal       Pupils: Pupils are equal, round, and reactive to light  Cardiovascular:      Rate and Rhythm: Normal rate and regular rhythm  Heart sounds: Normal heart sounds  No murmur heard  No friction rub  No gallop  Pulmonary:      Effort: Pulmonary effort is normal       Breath sounds: Normal breath sounds and air entry  Abdominal:      General: Abdomen is flat  Genitourinary:     Labia:         Right: Lesion present  Left: Lesion present  Comments: Ulceration on both in her labia  Necrotic tissue mainly posteriorly    Other areas are  compared to previous photos  Tender to palpation  Musculoskeletal:      Cervical back: Neck supple  Right lower leg: No edema  Left lower leg: No edema  Lymphadenopathy:      Cervical: No cervical adenopathy  Skin:     General: Skin is warm and dry  Findings: Wound present  Neurological:      Mental Status: She is alert and oriented to person, place, and time  Gait: Gait is intact  Psychiatric:         Attention and Perception: Attention normal          Mood and Affect: Mood and affect normal          Speech: Speech normal          Behavior: Behavior is cooperative  Cognition and Memory: Cognition normal          Wound 05/04/22 Vagina Outer (Active)   Wound Image            05/04/22 0838   Wound Description Beefy red;Pink;Yellow;Slough 05/04/22 0838   Stefany-wound Assessment Intact 05/04/22 0838   Wound Length (cm) 6 cm 05/04/22 0838   Wound Width (cm) 4 5 cm 05/04/22 0838   Wound Depth (cm) 3 cm 05/04/22 0838   Wound Surface Area (cm^2) 27 cm^2 05/04/22 0838   Wound Volume (cm^3) 81 cm^3 05/04/22 0838   Calculated Wound Volume (cm^3) 81 cm^3 05/04/22 0838   Drainage Amount Large 05/04/22 0838   Drainage Description Milky;Tan;Yellow 05/04/22 0838   Non-staged Wound Description Full thickness 05/04/22 0838   Dressing Status Old drainage;Leaking (Comment) 05/04/22 0838       Wound depth is 0 2 cm, not 3 cm                     Debridement   Universal Protocol:  Consent: Verbal consent obtained  Written consent obtained  Consent given by: patient  Time out: Immediately prior to procedure a "time out" was called to verify the correct patient, procedure, equipment, support staff and site/side marked as required    Patient understanding: patient states understanding of the procedure being performed  Patient identity confirmed: verbally with patient      Performed by: physician  Debridement type: selective  Pain control: lidocaine 4%  Post-debridement measurements  Length (cm): 6  Width (cm): 4 5  Depth (cm): 0 2  Percent debrided: 10%  Surface Area (cm^2): 27  Area debrided (cm^2): 2 7  Volume (cm^3): 5 4  Devitalized tissue debrided: necrotic debris and slough  Instrument(s) utilized: curette and scissors  Bleeding: small  Hemostasis obtained with: pressure  Procedural pain (0-10): 0  Post-procedural pain: 0   Response to treatment: procedure was tolerated well                 Wound Instructions:  Orders Placed This Encounter   Procedures    Wound cleansing and dressings     Wash your hands with soap and water  Remove old dressing, discard into plastic bag and place in trash  Cleanse the wound with soap and water prior to applying a clean dressing  Do not use tissue or cotton balls  Do not scrub the wound  Pat dry using gauze  Shower yes   Apply A & D ointment  to the vulvar wound  Cover with gauze ( place gauze in folds of your skin)  Continue to do sitz baths and pull your labia apart to urinate, then rinse with water  Follow up with your GYN MD tomorrow  Return in one week     Standing Status:   Future     Standing Expiration Date:   5/4/2023    Wound off loading     Off-loading Instructions:    Keep weight and pressure off wound at all times  , Turn every 2 hours  Avoid position directing pressure to Wound site  Limit side lying to 30 degree tilt  Limit the head of bed elevation to 30 degrees  and Do Not Sit for Long Periods of Time  Standing Status:   Future     Standing Expiration Date:   5/4/2023    Debridement     This order was created via procedure documentation       Total time spent today:  38 minutes  This includes reviewing the patient's chart, pertinent physician records including [facility] admission notes and discharge summary from 4/17/22, Urgent Care notes from 4/14 and 4/16/22, and multiple consultant notes during the hospitalization    I reviewed laboratory data during that hospitalization including cultures, CBC, CMP and Ivonne Wright MD, CHT, CWS    Portions of the record may have been created with voice recognition software  Occasional wrong word or "sound alike" substitutions may have occurred due to the inherent limitations of voice recognition software  Read the chart carefully and recognize, using context, where substitutions have occurred

## 2022-05-04 NOTE — LETTER
May 4, 2022     Duane Salazar, [address]    Patient: Jewel Rodrigues   YOB: 1970   Date of Visit: 5/4/2022       Dear Dr Karli Edwards:    Thank you for referring Hudson Bruce to me for evaluation  Below are my notes for this consultation  If you have questions, please do not hesitate to call me  I look forward to following your patient along with you  Sincerely,        Cisco Jean MD        CC: DO Cisco Salinas MD  5/4/2022 11:28 AM  Incomplete  Patient ID: Jewel Rodrigues is a 47 y o  female Date of Birth 1970       Chief Complaint   Patient presents with    New Patient Visit     vulvar wound       Allergies:  Red dye - food allergy, Clindamycin, Tizanidine, Latex, Mold extract [trichophyton], and Penicillins    Diagnosis:      Diagnosis ICD-10-CM Associated Orders   1  Vulvar ulceration  N76 6 lidocaine (LMX) 4 % cream     Wound cleansing and dressings     Wound off loading     Debridement           Assessment & Plan:   Multiple vulvar ulcers of unknown etiology  Possible medication reaction since the oral ulcers and the vulvar ulcers started while in the hospital   Possible viral etiology  Overall, the ulcers have significantly improved compared to photos that I reviewed from last week  Because of this, she will continue using A&D ointment since that helps to soothe and helps the burning while urinating  I excised some necrotic tissue   Follow up with her GYN and primary care as already scheduled  I will see her next week in follow-up   If things were not to improve, I would recommend dermatology consultation for possible biopsy  Subjective:   5/4/22:  1st visit for this 60-year-old female referred to the wound center because of vulvar ulcerations  Below is a complete summary of her recent history and problem  This is from her GYN     ""47 yr old F presents  for evaluation of vaginal ulcers  On April 13th she noted fever to 103  She was evaluated at an urgent care  Screening for the flu and COVID was negative  The following day she developed back pain  The fever persisted  On April 16th she felt worse and could not get out of bed  She had myalgias as well  She again went to an urgent care  A flu screen again was negative  She was prescribed a muscle relaxant for spasms and told to take Motrin  She had bilateral flank pain  Dysuria began  She developed hallucinations which she attributed to the anti spasmodic and was discontinued  Following day on Sunday the 17th she could not get out of bed  A temperature of a 103° was present this entire time  She was taken to [facility] by [facility]  When the squad came to her home she was found to be hypotensive  She was admitted with a diagnosis of sepsis, dehydration and hyponatremia  She was hospitalized for 6 days  A CT scan revealed ureteritis and a left ovarian cyst   She received IV vancomycin and rocephin and oral doxycycline      On the 19th, hospital day 3 she developed ulcers on her labia and in her mouth  They were painful  Her fever persisted  She was given Toradol for back pain  Upon her insistence she was discharged on the 22nd  Her temperature at that time was 100°  She had wanted her to stay for additional testing  She was seen by both Gynecology, Urology, rheumatology, and Infectious Disease  No one was able to make a diagnosis of the labial ulcers  The Gynecologist said that she had never seen anything like them before  She was finally afebrile on April 25th  Her maximum temperature yesterday was 99°     Today, the patient states that her mouth ulcers have improved with just salt water rinses though she is still unable to eat without discomfort  Her vaginal ulcers have worsened and complains of discharge now  States she is soaking through panties and has had to wear a liner   Complains of pain with urination from the urine touching her labia  She has not tried any remedies and she was not prescribed any ointment for pain relief at discharge  Pt saw her family physician today and genital cultures were collected  She was given repeat labs for chlamydia and gonorrhea however her GC/Chlamydia results were negative in the hospital       In addition, pt states that on 4/17 she had vaginal bleeding that lasted 2 days  She is unsure if it was the ulcers bleeding or her period  States her period usually lasts 7 days and is heavier than this episode  She is not sexually active at the moment and has been with her partner for 6 months  Last sexual intercourse was 1 year ago ""    Patient states that her fevers finally stopped about 3-4 days ago  She has generally feeling better but fatigued  She has returnedto work but only could do 4 hours at a time because of pain in the vulvar area  Her main complaint is burning of the area with urination  She has manage to address this with spreading the labia while urinating or soaking in water and urinating  She did notice a rather large amount of what she described as pus that drained  She was not sure what this actually was  This occured a day or two ago  She felt better  While hospitalized, all cultures including fungal, bacterial, and herpes were negative  Urine cultures were negative  Rheumatology felt that this was not Bechet's syndrome  Oral ulcers and labial ulcers all developed after she was admitted to the hospital   They have improved significantly  The oral ulcers are almost completely gone  Past medical history is otherwise unremarkable        The following portions of the patient's history were reviewed and updated as appropriate:   Patient Active Problem List   Diagnosis    Sebaceous cyst of labia    Allergic rhinitis    Arthralgia of multiple sites    Common migraine without aura    Depression with anxiety    Iron deficiency anemia due to chronic blood loss    Menorrhagia    Myofascial pain syndrome    Severe obesity (BMI 35 0-35 9 with comorbidity) ([facility])    Vitamin D deficiency    Chronic right-sided thoracic back pain    Abnormal weight gain    Cervical radiculopathy    Mass of sternum    Mid back pain    Dysmenorrhea    Skin lesion of left leg    Hot flashes    Epigastric pain    Anxiety    Other insomnia    Sepsis ([address] )    Ureteritis    Hypokalemia    Vulvar ulceration    History of sepsis    Oral ulceration    Lung field abnormal finding on examination    Vaginal ulceration    Symptom of leg swelling     Past Medical History:   Diagnosis Date    Allergic rhinitis     Anxiety     Arthralgia of multiple sites     Back pain     RIGHT LOW BACK PAIN   RESOLVED  [id number]  UPPER BACK PAIN RESOLVED     Breast nodule     Bronchitis     Cellulitis and abscess of leg     RESOLVED 2015    Chronic pain disorder     Common migraine without aura     Depression     Eczema     Fatigue     Forceps delivery      4 para 4     H/O molar pregnancy, antepartum     Herpes zoster     [id number] RESOLVED    Iron deficiency anemia     Irregular menses     Myalgia     Obesity     Pain in left shoulder     Prior pregnancy with fetal demise     Right shoulder pain     Sebaceous cyst     Sinusitis     Tinea corporis     Trichomoniasis     Vaginitis     Vitamin D deficiency     Yeast UTI      Past Surgical History:   Procedure Laterality Date    ADENOIDECTOMY      APPENDECTOMY      DILATION AND CURETTAGE OF UTERUS      LAPAROSCOPY      OOPHORECTOMY Right     age 28    OVARY SURGERY      TONSILLECTOMY      OVER AGE 16    TUBAL LIGATION      VENTRAL HERNIA REPAIR       Family History   Problem Relation Age of Onset    COPD Father     Lung cancer Father 76    Diabetes Maternal Grandmother     Diabetes Maternal Aunt     No Known Problems Mother     No Known Problems Daughter     No Known Problems Maternal Grandfather     No Known Problems Half-Sister     No Known Problems Half-Sister     No Known Problems Maternal Aunt     No Known Problems Maternal Aunt     No Known Problems Maternal Aunt     No Known Problems Maternal Aunt     No Known Problems Paternal Aunt     Hypertension Neg Hx     Heart disease Neg Hx     Stroke Neg Hx     Thyroid disease Neg Hx       Social History     Socioeconomic History    Marital status:      Spouse name: None    Number of children: 3    Years of education: None    Highest education level: Master's degree (e g , MA, MS, Barry, MEd, MSW, MAYTE)   Occupational History    Occupation: conselor/ director   Tobacco Use    Smoking status: Never Smoker    Smokeless tobacco: Never Used   Vaping Use    Vaping Use: Never used   Substance and Sexual Activity    Alcohol use: No    Drug use: No    Sexual activity: Yes     Partners: Male     Birth control/protection: Female Sterilization   Other Topics Concern    None   Social History Narrative    COFFEE DRINKER    LACK OF EXERCISE    Lives with 2 children     Social Determinants of Health     Financial Resource Strain: Not on file   Food Insecurity: Not on file   Transportation Needs: Not on file   Physical Activity: Not on file   Stress: Not on file   Social Connections: Not on file   Intimate Partner Violence: Not on file   Housing Stability: Not on file        Current Outpatient Medications:     cetirizine (ZYRTEC ALLERGY) 10 mg tablet, Take 10 mg by mouth as needed  , Disp: , Rfl:     lidocaine-prilocaine (EMLA) cream, Please specify directions, refills and quantity, Disp: 30 g, Rfl: 2    lidocaine-prilocaine (EMLA) cream, Apply topically as needed for mild pain, Disp: 30 g, Rfl: 3    multivitamin (THERAGRAN) TABS, Take 1 tablet by mouth daily  , Disp: , Rfl:     ferrous sulfate 324 (65 Fe) mg, Take 1 tablet (324 mg total) by mouth 2 (two) times a day before meals (Patient not taking: Reported on 4/26/2022 ), Disp: 60 tablet, Rfl: 5  No current facility-administered medications for this visit  Review of Systems   Constitutional: Positive for fatigue  Negative for appetite change, chills, fever and unexpected weight change  HENT: Negative for congestion, hearing loss, postnasal drip and sinus pressure  Oral ulcers   Eyes: Negative for discharge and visual disturbance  Respiratory: Negative for cough and shortness of breath  Cardiovascular: Negative for chest pain, palpitations and leg swelling  Gastrointestinal: Negative for abdominal pain, blood in stool, constipation, diarrhea and nausea  Endocrine: Negative  Genitourinary: Positive for genital sores  Negative for difficulty urinating, dysuria and urgency  Labial ulcers   Musculoskeletal: Negative for back pain and gait problem  Skin: Positive for wound (External genitalia)  Negative for rash  Allergic/Immunologic: Negative  Neurological: Negative for dizziness, tremors, seizures, weakness, numbness and headaches  Hematological: Does not bruise/bleed easily  Psychiatric/Behavioral: Negative  Negative for dysphoric mood  The patient is not nervous/anxious  Objective:  /80   Pulse 84   Temp (!) 96 4 °F (35 8 °C)   Resp 18   Ht 5' (1 524 m)   Wt 77 1 kg (170 lb)   BMI 33 20 kg/m²   Pain Score:   7     Physical Exam  Vitals and nursing note reviewed  Exam conducted with a chaperone present  Constitutional:       Appearance: Normal appearance  She is well-developed and overweight  HENT:      Head: Normocephalic and atraumatic  Right Ear: External ear normal       Left Ear: External ear normal       Mouth/Throat:      Mouth: Oral lesions (One ulceration on the left buccal area) present  Dentition: Normal dentition  Comments: masked  Eyes:      General: Lids are normal          Right eye: No discharge  Left eye: No discharge        Conjunctiva/sclera: Conjunctivae normal       Pupils: Pupils are equal, round, and reactive to light  Cardiovascular:      Rate and Rhythm: Normal rate and regular rhythm  Heart sounds: Normal heart sounds  No murmur heard  No friction rub  No gallop  Pulmonary:      Effort: Pulmonary effort is normal       Breath sounds: Normal breath sounds and air entry  Abdominal:      General: Abdomen is flat  Genitourinary:     Labia:         Right: Lesion present  Left: Lesion present  Comments: Ulceration on both in her labia  Necrotic tissue mainly posteriorly  Other areas are  compared to previous photos  Tender to palpation  Musculoskeletal:      Cervical back: Neck supple  Right lower leg: No edema  Left lower leg: No edema  Lymphadenopathy:      Cervical: No cervical adenopathy  Skin:     General: Skin is warm and dry  Findings: Wound present  Neurological:      Mental Status: She is alert and oriented to person, place, and time  Gait: Gait is intact  Psychiatric:         Attention and Perception: Attention normal          Mood and Affect: Mood and affect normal          Speech: Speech normal          Behavior: Behavior is cooperative           Cognition and Memory: Cognition normal          Wound 05/04/22 Vagina Outer (Active)   Wound Image            05/04/22 0838   Wound Description Beefy red;Pink;Yellow;Slough 05/04/22 0838   Stefany-wound Assessment Intact 05/04/22 0838   Wound Length (cm) 6 cm 05/04/22 0838   Wound Width (cm) 4 5 cm 05/04/22 0838   Wound Depth (cm) 3 cm 05/04/22 0838   Wound Surface Area (cm^2) 27 cm^2 05/04/22 0838   Wound Volume (cm^3) 81 cm^3 05/04/22 0838   Calculated Wound Volume (cm^3) 81 cm^3 05/04/22 0838   Drainage Amount Large 05/04/22 0838   Drainage Description Milky;Tan;Yellow 05/04/22 0838   Non-staged Wound Description Full thickness 05/04/22 0838   Dressing Status Old drainage;Leaking (Comment) 05/04/22 0838       Wound depth is 0 2 cm, not 3 cm                     Debridement Universal Protocol:  Consent: Verbal consent obtained  Written consent obtained  Consent given by: patient  Time out: Immediately prior to procedure a "time out" was called to verify the correct patient, procedure, equipment, support staff and site/side marked as required  Patient understanding: patient states understanding of the procedure being performed  Patient identity confirmed: verbally with patient      Performed by: physician  Debridement type: selective  Pain control: lidocaine 4%  Post-debridement measurements  Length (cm): 6  Width (cm): 4 5  Depth (cm): 0 2  Percent debrided: 10%  Surface Area (cm^2): 27  Area debrided (cm^2): 2 7  Volume (cm^3): 5 4  Devitalized tissue debrided: necrotic debris and slough  Instrument(s) utilized: curette and scissors  Bleeding: small  Hemostasis obtained with: pressure  Procedural pain (0-10): 0  Post-procedural pain: 0   Response to treatment: procedure was tolerated well                 Wound Instructions:  Orders Placed This Encounter   Procedures    Wound cleansing and dressings     Wash your hands with soap and water  Remove old dressing, discard into plastic bag and place in trash  Cleanse the wound with soap and water prior to applying a clean dressing  Do not use tissue or cotton balls  Do not scrub the wound  Pat dry using gauze  Shower yes   Apply A & D ointment  to the vulvar wound  Cover with gauze ( place gauze in folds of your skin)  Continue to do sitz baths and pull your labia apart to urinate, then rinse with water  Follow up with your GYN MD tomorrow  Return in one week     Standing Status:   Future     Standing Expiration Date:   5/4/2023    Wound off loading     Off-loading Instructions:    Keep weight and pressure off wound at all times  , Turn every 2 hours  Avoid position directing pressure to Wound site  Limit side lying to 30 degree tilt  Limit the head of bed elevation to 30 degrees  and Do Not Sit for Long Periods of Time  Standing Status:   Future     Standing Expiration Date:   5/4/2023    Debridement     This order was created via procedure documentation       Total time spent today:  38 minutes  This includes reviewing the patient's chart, pertinent physician records including [facility] admission notes and discharge summary from 4/17/22, Urgent Care notes from 4/14 and 4/16/22, and multiple consultant notes during the hospitalization  I reviewed laboratory data during that hospitalization including cultures, CBC, CMP and BMP  Cecil Boss MD, CHT, CWS    Portions of the record may have been created with voice recognition software  Occasional wrong word or "sound alike" substitutions may have occurred due to the inherent limitations of voice recognition software  Read the chart carefully and recognize, using context, where substitutions have occurred  Cecil Boss MD  5/4/2022 11:27 AM  Sign when Signing Visit  Patient ID: Job Pardo is a 47 y o  female Date of Birth 1970       Chief Complaint   Patient presents with    New Patient Visit     vulvar wound       Allergies:  Red dye - food allergy, Clindamycin, Tizanidine, Latex, Mold extract [trichophyton], and Penicillins    Diagnosis:      Diagnosis ICD-10-CM Associated Orders   1  Vulvar ulceration  N76 6 lidocaine (LMX) 4 % cream     Wound cleansing and dressings     Wound off loading     Debridement           Assessment & Plan:   Multiple vulvar ulcers of unknown etiology  Possible medication reaction since the oral ulcers and the vulvar ulcers started while in the hospital   Possible viral etiology  Overall, the ulcers have significantly improved compared to photos that I reviewed from last week  Because of this, she will continue using A&D ointment since that helps to soothe and helps the burning while urinating  I excised some necrotic tissue   Follow up with her GYN and primary care as already scheduled    I will see her next week in follow-up   If things were not to improve, I would recommend dermatology consultation for possible biopsy  Subjective:   5/4/22:  1st visit for this 60-year-old female referred to the wound center because of vulvar ulcerations  Below is a complete summary of her recent history and problem  This is from her GYN     ""47 yr old F presents  for evaluation of vaginal ulcers  On April 13th she noted fever to 103  She was evaluated at an urgent care  Screening for the flu and COVID was negative  The following day she developed back pain  The fever persisted  On April 16th she felt worse and could not get out of bed  She had myalgias as well  She again went to an urgent care  A flu screen again was negative  She was prescribed a muscle relaxant for spasms and told to take Motrin  She had bilateral flank pain  Dysuria began  She developed hallucinations which she attributed to the anti spasmodic and was discontinued  Following day on Sunday the 17th she could not get out of bed  A temperature of a 103° was present this entire time  She was taken to [facility] by [facility]  When the squad came to her home she was found to be hypotensive  She was admitted with a diagnosis of sepsis, dehydration and hyponatremia  She was hospitalized for 6 days  A CT scan revealed ureteritis and a left ovarian cyst   She received IV vancomycin and rocephin and oral doxycycline      On the 19th, hospital day 3 she developed ulcers on her labia and in her mouth  They were painful  Her fever persisted  She was given Toradol for back pain  Upon her insistence she was discharged on the 22nd  Her temperature at that time was 100°  She had wanted her to stay for additional testing  She was seen by both Gynecology, Urology, rheumatology, and Infectious Disease  No one was able to make a diagnosis of the labial ulcers    The Gynecologist said that she had never seen anything like them before  She was finally afebrile on April 25th  Her maximum temperature yesterday was 99°     Today, the patient states that her mouth ulcers have improved with just salt water rinses though she is still unable to eat without discomfort  Her vaginal ulcers have worsened and complains of discharge now  States she is soaking through panties and has had to wear a liner  Complains of pain with urination from the urine touching her labia  She has not tried any remedies and she was not prescribed any ointment for pain relief at discharge  Pt saw her family physician today and genital cultures were collected  She was given repeat labs for chlamydia and gonorrhea however her GC/Chlamydia results were negative in the hospital       In addition, pt states that on 4/17 she had vaginal bleeding that lasted 2 days  She is unsure if it was the ulcers bleeding or her period  States her period usually lasts 7 days and is heavier than this episode  She is not sexually active at the moment and has been with her partner for 6 months  Last sexual intercourse was 1 year ago ""    Patient states that her fevers finally stopped about 3-4 days ago  She has generally feeling better but fatigued  She has returnedto work but only could do 4 hours at a time because of pain in the vulvar area  Her main complaint is burning of the area with urination  She has manage to address this with spreading the labia while urinating or soaking in water and urinating  She did notice a rather large amount of what she described as pus that drained  She was not sure what this actually was  This occured a day or two ago  She felt better  While hospitalized, all cultures including fungal, bacterial, and herpes were negative  Urine cultures were negative  Rheumatology felt that this was not Bechet's syndrome  Oral ulcers and labial ulcers all developed after she was admitted to the hospital   They have improved significantly    The oral ulcers are almost completely gone  Past medical history is otherwise unremarkable        The following portions of the patient's history were reviewed and updated as appropriate:   Patient Active Problem List   Diagnosis    Sebaceous cyst of labia    Allergic rhinitis    Arthralgia of multiple sites    Common migraine without aura    Depression with anxiety    Iron deficiency anemia due to chronic blood loss    Menorrhagia    Myofascial pain syndrome    Severe obesity (BMI 35 0-35 9 with comorbidity) ([facility]    Vitamin D deficiency    Chronic right-sided thoracic back pain    Abnormal weight gain    Cervical radiculopathy    Mass of sternum    Mid back pain    Dysmenorrhea    Skin lesion of left leg    Hot flashes    Epigastric pain    Anxiety    Other insomnia    Sepsis ([facility]    Ureteritis    Hypokalemia    Vulvar ulceration    History of sepsis    Oral ulceration    Lung field abnormal finding on examination    Vaginal ulceration    Symptom of leg swelling     Past Medical History:   Diagnosis Date    Allergic rhinitis     Anxiety     Arthralgia of multiple sites     Back pain     RIGHT LOW BACK PAIN   RESOLVED  [id number]  UPPER BACK PAIN RESOLVED [id number]    Breast nodule     Bronchitis     Cellulitis and abscess of leg     RESOLVED 2015    Chronic pain disorder     Common migraine without aura     Depression     Eczema     Fatigue     Forceps delivery      4 para 3     H/O molar pregnancy, antepartum     Herpes zoster     [id number] RESOLVED    Iron deficiency anemia     Irregular menses     Myalgia     Obesity     Pain in left shoulder     Prior pregnancy with fetal demise     Right shoulder pain     Sebaceous cyst     Sinusitis     Tinea corporis     Trichomoniasis     Vaginitis     Vitamin D deficiency     Yeast UTI      Past Surgical History:   Procedure Laterality Date    ADENOIDECTOMY      APPENDECTOMY      DILATION AND CURETTAGE OF UTERUS  LAPAROSCOPY      OOPHORECTOMY Right     age 28    OVARY SURGERY      TONSILLECTOMY      OVER AGE 16    TUBAL LIGATION      VENTRAL HERNIA REPAIR       Family History   Problem Relation Age of Onset    COPD Father     Lung cancer Father 76    Diabetes Maternal Grandmother     Diabetes Maternal Aunt     No Known Problems Mother     No Known Problems Daughter     No Known Problems Maternal Grandfather     No Known Problems Half-Sister     No Known Problems Half-Sister     No Known Problems Maternal Aunt     No Known Problems Maternal Aunt     No Known Problems Maternal Aunt     No Known Problems Maternal Aunt     No Known Problems Paternal Aunt     Hypertension Neg Hx     Heart disease Neg Hx     Stroke Neg Hx     Thyroid disease Neg Hx       Social History     Socioeconomic History    Marital status:      Spouse name: None    Number of children: 3    Years of education: None    Highest education level: Master's degree (e g , MA, MS, Barry, MEd, MSW, MAYTE)   Occupational History    Occupation: conselor/ director   Tobacco Use    Smoking status: Never Smoker    Smokeless tobacco: Never Used   Vaping Use    Vaping Use: Never used   Substance and Sexual Activity    Alcohol use: No    Drug use: No    Sexual activity: Yes     Partners: Male     Birth control/protection: Female Sterilization   Other Topics Concern    None   Social History Narrative    COFFEE DRINKER    LACK OF EXERCISE    Lives with 2 children     Social Determinants of Health     Financial Resource Strain: Not on file   Food Insecurity: Not on file   Transportation Needs: Not on file   Physical Activity: Not on file   Stress: Not on file   Social Connections: Not on file   Intimate Partner Violence: Not on file   Housing Stability: Not on file        Current Outpatient Medications:     cetirizine (ZYRTEC ALLERGY) 10 mg tablet, Take 10 mg by mouth as needed  , Disp: , Rfl:     lidocaine-prilocaine (EMLA) cream, Please specify directions, refills and quantity, Disp: 30 g, Rfl: 2    lidocaine-prilocaine (EMLA) cream, Apply topically as needed for mild pain, Disp: 30 g, Rfl: 3    multivitamin (THERAGRAN) TABS, Take 1 tablet by mouth daily  , Disp: , Rfl:     ferrous sulfate 324 (65 Fe) mg, Take 1 tablet (324 mg total) by mouth 2 (two) times a day before meals (Patient not taking: Reported on 4/26/2022 ), Disp: 60 tablet, Rfl: 5  No current facility-administered medications for this visit  Review of Systems   Constitutional: Positive for fatigue  Negative for appetite change, chills, fever and unexpected weight change  HENT: Negative for congestion, hearing loss, postnasal drip and sinus pressure  Oral ulcers   Eyes: Negative for discharge and visual disturbance  Respiratory: Negative for cough and shortness of breath  Cardiovascular: Negative for chest pain, palpitations and leg swelling  Gastrointestinal: Negative for abdominal pain, blood in stool, constipation, diarrhea and nausea  Endocrine: Negative  Genitourinary: Positive for genital sores  Negative for difficulty urinating, dysuria and urgency  Labial ulcers   Musculoskeletal: Negative for back pain and gait problem  Skin: Positive for wound (External genitalia)  Negative for rash  Allergic/Immunologic: Negative  Neurological: Negative for dizziness, tremors, seizures, weakness, numbness and headaches  Hematological: Does not bruise/bleed easily  Psychiatric/Behavioral: Negative  Negative for dysphoric mood  The patient is not nervous/anxious  Objective:  /80   Pulse 84   Temp (!) 96 4 °F (35 8 °C)   Resp 18   Ht 5' (1 524 m)   Wt 77 1 kg (170 lb)   BMI 33 20 kg/m²   Pain Score:   7     Physical Exam  Vitals and nursing note reviewed  Exam conducted with a chaperone present  Constitutional:       Appearance: Normal appearance  She is well-developed and overweight     HENT:      Head: Normocephalic and atraumatic  Right Ear: External ear normal       Left Ear: External ear normal       Mouth/Throat:      Mouth: Oral lesions (One ulceration on the left buccal area) present  Dentition: Normal dentition  Comments: masked  Eyes:      General: Lids are normal          Right eye: No discharge  Left eye: No discharge  Conjunctiva/sclera: Conjunctivae normal       Pupils: Pupils are equal, round, and reactive to light  Cardiovascular:      Rate and Rhythm: Normal rate and regular rhythm  Heart sounds: Normal heart sounds  No murmur heard  No friction rub  No gallop  Pulmonary:      Effort: Pulmonary effort is normal       Breath sounds: Normal breath sounds and air entry  Abdominal:      General: Abdomen is flat  Genitourinary:     Labia:         Right: Lesion present  Left: Lesion present  Comments: Ulceration on both in her labia  Necrotic tissue mainly posteriorly  Other areas are  compared to previous photos  Tender to palpation  Musculoskeletal:      Cervical back: Neck supple  Right lower leg: No edema  Left lower leg: No edema  Lymphadenopathy:      Cervical: No cervical adenopathy  Skin:     General: Skin is warm and dry  Findings: Wound present  Neurological:      Mental Status: She is alert and oriented to person, place, and time  Gait: Gait is intact  Psychiatric:         Attention and Perception: Attention normal          Mood and Affect: Mood and affect normal          Speech: Speech normal          Behavior: Behavior is cooperative           Cognition and Memory: Cognition normal          Wound 05/04/22 Vagina Outer (Active)   Wound Image            05/04/22 0838   Wound Description Beefy red;Pink;Yellow;Slough 05/04/22 0838   Stefany-wound Assessment Intact 05/04/22 0838   Wound Length (cm) 6 cm 05/04/22 0838   Wound Width (cm) 4 5 cm 05/04/22 0838   Wound Depth (cm) 3 cm 05/04/22 0838   Wound Surface Area (cm^2) 27 cm^2 05/04/22 0838   Wound Volume (cm^3) 81 cm^3 05/04/22 0838   Calculated Wound Volume (cm^3) 81 cm^3 05/04/22 0838   Drainage Amount Large 05/04/22 0838   Drainage Description Milky;Tan;Yellow 05/04/22 0838   Non-staged Wound Description Full thickness 05/04/22 0838   Dressing Status Old drainage;Leaking (Comment) 05/04/22 0838                            Debridement   Universal Protocol:  Consent: Verbal consent obtained  Written consent obtained  Consent given by: patient  Time out: Immediately prior to procedure a "time out" was called to verify the correct patient, procedure, equipment, support staff and site/side marked as required  Patient understanding: patient states understanding of the procedure being performed  Patient identity confirmed: verbally with patient      Performed by: physician  Debridement type: selective  Pain control: lidocaine 4%  Post-debridement measurements  Length (cm): 6  Width (cm): 4 5  Depth (cm): 0 2  Percent debrided: 10%  Surface Area (cm^2): 27  Area debrided (cm^2): 2 7  Volume (cm^3): 5 4  Devitalized tissue debrided: necrotic debris and slough  Instrument(s) utilized: curette and scissors  Bleeding: small  Hemostasis obtained with: pressure  Procedural pain (0-10): 0  Post-procedural pain: 0   Response to treatment: procedure was tolerated well                 Wound Instructions:  Orders Placed This Encounter   Procedures    Wound cleansing and dressings     Wash your hands with soap and water  Remove old dressing, discard into plastic bag and place in trash  Cleanse the wound with soap and water prior to applying a clean dressing  Do not use tissue or cotton balls  Do not scrub the wound  Pat dry using gauze  Shower yes   Apply A & D ointment  to the vulvar wound  Cover with gauze ( place gauze in folds of your skin)  Continue to do sitz baths and pull your labia apart to urinate, then rinse with water  Follow up with your GYN MD tomorrow    Return in one week Standing Status:   Future     Standing Expiration Date:   5/4/2023    Wound off loading     Off-loading Instructions:    Keep weight and pressure off wound at all times  , Turn every 2 hours  Avoid position directing pressure to Wound site  Limit side lying to 30 degree tilt  Limit the head of bed elevation to 30 degrees  and Do Not Sit for Long Periods of Time  Standing Status:   Future     Standing Expiration Date:   5/4/2023    Debridement     This order was created via procedure documentation       Total time spent today:  38 minutes  This includes reviewing the patient's chart, pertinent physician records including [facility] admission notes and discharge summary from 4/17/22, Urgent Care notes from 4/14 and 4/16/22, and multiple consultant notes during the hospitalization  I reviewed laboratory data during that hospitalization including cultures, CBC, CMP and BMP  Ledy Pressley MD, CHT, CWS    Portions of the record may have been created with voice recognition software  Occasional wrong word or "sound alike" substitutions may have occurred due to the inherent limitations of voice recognition software  Read the chart carefully and recognize, using context, where substitutions have occurred

## 2022-05-04 NOTE — PATIENT INSTRUCTIONS
Orders Placed This Encounter   Procedures    Wound cleansing and dressings     Wash your hands with soap and water  Remove old dressing, discard into plastic bag and place in trash  Cleanse the wound with soap and water prior to applying a clean dressing  Do not use tissue or cotton balls  Do not scrub the wound  Pat dry using gauze  Shower yes   Apply A & D ointment  to the vulvar wound  Cover with gauze ( place gauze in folds of your skin)  Continue to do sitz baths and pull your labia apart to urinate, then rinse with water  Follow up with your GYN MD tomorrow  Return in one week     Standing Status:   Future     Standing Expiration Date:   5/4/2023    Wound off loading     Off-loading Instructions:    Keep weight and pressure off wound at all times  , Turn every 2 hours  Avoid position directing pressure to Wound site  Limit side lying to 30 degree tilt  Limit the head of bed elevation to 30 degrees  and Do Not Sit for Long Periods of Time       Standing Status:   Future     Standing Expiration Date:   5/4/2023

## 2022-05-04 NOTE — LETTER
May 4, 2022     Patient: Kerri Navas  YOB: 1970  Date of Visit: 5/4/2022      To Whom it May Concern:    Lorna Scott is under my professional care  Colin Blanco was seen in my office on 5/4/2022  Colin Blanco    If you have any questions or concerns, please don't hesitate to call           Sincerely,          Estela Bartlett MD        CC:   No Recipients

## 2022-05-05 ENCOUNTER — OFFICE VISIT (OUTPATIENT)
Dept: OBGYN CLINIC | Facility: CLINIC | Age: 52
End: 2022-05-05
Payer: COMMERCIAL

## 2022-05-05 VITALS — DIASTOLIC BLOOD PRESSURE: 68 MMHG | SYSTOLIC BLOOD PRESSURE: 116 MMHG | BODY MASS INDEX: 31.72 KG/M2 | WEIGHT: 162.4 LBS

## 2022-05-05 DIAGNOSIS — N76.5 VAGINAL ULCERATION: ICD-10-CM

## 2022-05-05 DIAGNOSIS — Z86.19 HISTORY OF SEPSIS: ICD-10-CM

## 2022-05-05 DIAGNOSIS — N76.6 VULVAR ULCERATION: Primary | ICD-10-CM

## 2022-05-05 DIAGNOSIS — K12.1 ORAL ULCERATION: ICD-10-CM

## 2022-05-05 DIAGNOSIS — N28.89 URETERITIS: ICD-10-CM

## 2022-05-05 PROCEDURE — 99213 OFFICE O/P EST LOW 20 MIN: CPT | Performed by: OBSTETRICS & GYNECOLOGY

## 2022-05-08 PROBLEM — J90 PLEURAL EFFUSION, LEFT: Status: ACTIVE | Noted: 2022-05-08

## 2022-05-13 ENCOUNTER — OFFICE VISIT (OUTPATIENT)
Dept: FAMILY MEDICINE CLINIC | Facility: CLINIC | Age: 52
End: 2022-05-13
Payer: COMMERCIAL

## 2022-05-13 ENCOUNTER — OFFICE VISIT (OUTPATIENT)
Dept: WOUND CARE | Facility: CLINIC | Age: 52
End: 2022-05-13
Payer: COMMERCIAL

## 2022-05-13 VITALS
DIASTOLIC BLOOD PRESSURE: 60 MMHG | HEIGHT: 60 IN | HEART RATE: 92 BPM | TEMPERATURE: 98.7 F | BODY MASS INDEX: 30.63 KG/M2 | WEIGHT: 156 LBS | SYSTOLIC BLOOD PRESSURE: 102 MMHG | RESPIRATION RATE: 16 BRPM

## 2022-05-13 VITALS
DIASTOLIC BLOOD PRESSURE: 80 MMHG | SYSTOLIC BLOOD PRESSURE: 122 MMHG | HEART RATE: 76 BPM | RESPIRATION RATE: 14 BRPM | TEMPERATURE: 97 F

## 2022-05-13 DIAGNOSIS — N76.5 VAGINAL ULCERATION: ICD-10-CM

## 2022-05-13 DIAGNOSIS — N76.6 VULVAR ULCERATION: Primary | ICD-10-CM

## 2022-05-13 DIAGNOSIS — J01.40 ACUTE NON-RECURRENT PANSINUSITIS: Primary | ICD-10-CM

## 2022-05-13 PROCEDURE — 99213 OFFICE O/P EST LOW 20 MIN: CPT | Performed by: FAMILY MEDICINE

## 2022-05-13 PROCEDURE — 3008F BODY MASS INDEX DOCD: CPT | Performed by: OBSTETRICS & GYNECOLOGY

## 2022-05-13 PROCEDURE — 99214 OFFICE O/P EST MOD 30 MIN: CPT | Performed by: FAMILY MEDICINE

## 2022-05-13 PROCEDURE — 99212 OFFICE O/P EST SF 10 MIN: CPT | Performed by: FAMILY MEDICINE

## 2022-05-13 RX ORDER — DOXYCYCLINE HYCLATE 100 MG/1
100 CAPSULE ORAL EVERY 12 HOURS SCHEDULED
Qty: 20 CAPSULE | Refills: 0 | Status: SHIPPED | OUTPATIENT
Start: 2022-05-13 | End: 2022-05-23

## 2022-05-13 RX ORDER — PREDNISONE 20 MG/1
20 TABLET ORAL DAILY
Qty: 5 TABLET | Refills: 0 | Status: SHIPPED | OUTPATIENT
Start: 2022-05-13 | End: 2022-05-18

## 2022-05-13 NOTE — ASSESSMENT & PLAN NOTE
Patient having vaginal and vulvar ulcerations  Seen by wound care  Note reviewed  The ulcerations have improved  No discharge or bleeding  Patient was advised to do warm water soaks  Advised patient continue with this and follow-up with specialist as needed

## 2022-05-13 NOTE — PATIENT INSTRUCTIONS
You have an acute non recurrent pansinusitis  We will start you on doxycycline 100 mg 1 tab twice a day for the next 10 days  This will likely irritated her stomach  Please take it with some food  Will also start you on prednisone or steroids which will help to decrease the congestion much more quickly  Please take prednisone 1 tab daily for the next 5 days  For pain or discomfort, fevers, you may take Tylenol extra-strength 500 mg  2 tabs 3 times a day as needed      Follow-up as needed

## 2022-05-13 NOTE — LETTER
May 13, 2022     Patient: Wayne Chen  YOB: 1970  Date of Visit: 5/13/2022      To Whom it May Concern:    Tomer Herrera is under my professional care  Mortimer Melissa was seen in my office on 5/13/2022  If you have any questions or concerns, please don't hesitate to call           Sincerely,          Ligia Levine MD, CHT, CWS        CC: No Recipients

## 2022-05-13 NOTE — PATIENT INSTRUCTIONS
Orders Placed This Encounter   Procedures    Wound cleansing and dressings     Discharged from the wound center  Continue treatment below and follow up with GYN  Contact the wound center if needed  Wash your hands with soap and water  Remove old dressing, discard into plastic bag and place in trash  Cleanse the wound with soap and water prior to applying a clean dressing  Do not use tissue or cotton balls  Do not scrub the wound  Pat dry using gauze  Shower yes  Apply A & D ointment  to the vulvar wound  Cover with gauze ( place gauze in folds of your skin)  Continue to do sitz baths and pull your labia apart to urinate, then rinse with water  Keep weight and pressure off wound at all times  , Turn every 2 hours  Avoid position directing pressure to Wound site  Limit side lying to 30 degree tilt  Limit the head of bed elevation to 30 degrees  and Do Not Sit for Long Periods of Time  Consume 3-4 servings of protein per day to assist with wound healing  Follow up at the wound center in one week  Treatment in the wound center today: Cleansed with normal saline  Patient to apply A & D ointment       Standing Status:   Future     Standing Expiration Date:   5/13/2023

## 2022-05-13 NOTE — PROGRESS NOTES
Patient ID: Job Pardo is a 47 y o  female Date of Birth 1970       Chief Complaint   Patient presents with    Follow Up Wound Care Visit     Vaginal wound       Allergies:  Red dye - food allergy, Clindamycin, Tizanidine, Latex, Mold extract [trichophyton], and Penicillins    Diagnosis:      Diagnosis ICD-10-CM Associated Orders   1  Vulvar ulceration  N76 6 Wound cleansing and dressings           Assessment & Plan:   Multiple vulvar ulcerations of unknown etiology, overall improved  Slightly less pain   Since patient most likely was not applying the ointment (A& D) to the open areas, we discussed how to do this and she will try   Since she is improving, patient will be discharged and will followup with her GYN  She may return to the wound center should she have deterioration  Subjective:   5/4/22:  1st visit for this 60-year-old female referred to the wound center because of vulvar ulcerations  Below is a complete summary of her recent history and problem  This is from her GYN     ""47 yr old F presents  for evaluation of vaginal ulcers  On April 13th she noted fever to 103  She was evaluated at an urgent care  Screening for the flu and COVID was negative  The following day she developed back pain  The fever persisted  On April 16th she felt worse and could not get out of bed  She had myalgias as well  She again went to an urgent care  A flu screen again was negative  She was prescribed a muscle relaxant for spasms and told to take Motrin  She had bilateral flank pain  Dysuria began  She developed hallucinations which she attributed to the anti spasmodic and was discontinued  Following day on Sunday the 17th she could not get out of bed  A temperature of a 103° was present this entire time  She was taken to [facility] by ambulance  When the squad came to her home she was found to be hypotensive    She was admitted with a diagnosis of sepsis, dehydration and hyponatremia  She was hospitalized for 6 days  A CT scan revealed ureteritis and a left ovarian cyst   She received IV vancomycin and rocephin and oral doxycycline      On the 19th, hospital day 3 she developed ulcers on her labia and in her mouth  They were painful  Her fever persisted  She was given Toradol for back pain  Upon her insistence she was discharged on the 22nd  Her temperature at that time was 100°  She had wanted her to stay for additional testing  She was seen by both Gynecology, Urology, rheumatology, and Infectious Disease  No one was able to make a diagnosis of the labial ulcers  The Gynecologist said that she had never seen anything like them before  She was finally afebrile on April 25th  Her maximum temperature yesterday was 99°     Today, the patient states that her mouth ulcers have improved with just salt water rinses though she is still unable to eat without discomfort  Her vaginal ulcers have worsened and complains of discharge now  States she is soaking through panties and has had to wear a liner  Complains of pain with urination from the urine touching her labia  She has not tried any remedies and she was not prescribed any ointment for pain relief at discharge  Pt saw her family physician today and genital cultures were collected  She was given repeat labs for chlamydia and gonorrhea however her GC/Chlamydia results were negative in the hospital       In addition, pt states that on 4/17 she had vaginal bleeding that lasted 2 days  She is unsure if it was the ulcers bleeding or her period  States her period usually lasts 7 days and is heavier than this episode  She is not sexually active at the moment and has been with her partner for 6 months  Last sexual intercourse was 1 year ago ""    Patient states that her fevers finally stopped about 3-4 days ago  She has generally feeling better but fatigued    She has returnedto work but only could do 4 hours at a time because of pain in the vulvar area  Her main complaint is burning of the area with urination  She has manage to address this with spreading the labia while urinating or soaking in water and urinating  She did notice a rather large amount of what she described as pus that drained  She was not sure what this actually was  This occured a day or two ago  She felt better  While hospitalized, all cultures including fungal, bacterial, and herpes were negative  Urine cultures were negative  Rheumatology felt that this was not Bechet's syndrome  Oral ulcers and labial ulcers all developed after she was admitted to the hospital   They have improved significantly  The oral ulcers are almost completely gone  Past medical history is otherwise unremarkable  5/13/22: Followup vulvar ulcerations of unknown etiology  She notes that slowly her pain is improving  She is using a A&D ointment but with further question in, not applying to the ulcerations  She was afraid to touch the areas  No further fever or chills  Overall appetite is better and she is feeling improved        The following portions of the patient's history were reviewed and updated as appropriate:   Patient Active Problem List   Diagnosis    Sebaceous cyst of labia    Allergic rhinitis    Arthralgia of multiple sites    Common migraine without aura    Depression with anxiety    Iron deficiency anemia due to chronic blood loss    Menorrhagia    Myofascial pain syndrome    Severe obesity (BMI 35 0-35 9 with comorbidity) ([address] )    Vitamin D deficiency    Chronic right-sided thoracic back pain    Abnormal weight gain    Cervical radiculopathy    Mass of sternum    Mid back pain    Dysmenorrhea    Skin lesion of left leg    Hot flashes    Epigastric pain    Anxiety    Other insomnia    Sepsis (HCC)    Ureteritis    Hypokalemia    Vulvar ulceration    History of sepsis    Oral ulceration    Lung field abnormal finding on examination  Vaginal ulceration    Symptom of leg swelling    Pleural effusion, left     Past Medical History:   Diagnosis Date    Allergic rhinitis     Anxiety     Arthralgia of multiple sites     Back pain     RIGHT LOW BACK PAIN   RESOLVED  [id number]  UPPER BACK PAIN RESOLVED [id number]    Breast nodule     Bronchitis     Cellulitis and abscess of leg     RESOLVED 2015    Chronic pain disorder     Common migraine without aura     Depression     Eczema     Fatigue     Forceps delivery      4 para 4     H/O molar pregnancy, antepartum     Herpes zoster     [id number] RESOLVED    Iron deficiency anemia     Irregular menses     Myalgia     Obesity     Pain in left shoulder     Prior pregnancy with fetal demise     Right shoulder pain     Sebaceous cyst     Sinusitis     Tinea corporis     Trichomoniasis     Vaginitis     Vitamin D deficiency     Yeast UTI      Past Surgical History:   Procedure Laterality Date    ADENOIDECTOMY      APPENDECTOMY      DILATION AND CURETTAGE OF UTERUS      LAPAROSCOPY      OOPHORECTOMY Right     age 28    OVARY SURGERY      TONSILLECTOMY      OVER AGE 16    TUBAL LIGATION      VENTRAL HERNIA REPAIR       Family History   Problem Relation Age of Onset    COPD Father     Lung cancer Father 76    Diabetes Maternal Grandmother     Diabetes Maternal Aunt     No Known Problems Mother     No Known Problems Daughter     No Known Problems Maternal Grandfather     No Known Problems Half-Sister     No Known Problems Half-Sister     No Known Problems Maternal Aunt     No Known Problems Maternal Aunt     No Known Problems Maternal Aunt     No Known Problems Maternal Aunt     No Known Problems Paternal Aunt     Hypertension Neg Hx     Heart disease Neg Hx     Stroke Neg Hx     Thyroid disease Neg Hx       Social History     Socioeconomic History    Marital status:      Spouse name: None    Number of children: 3    Years of education: None    Highest education level: Master's degree (e g , MA, MS, Barry, MEd, MSW, MAYTE)   Occupational History    Occupation: conselor/ director   Tobacco Use    Smoking status: Never Smoker    Smokeless tobacco: Never Used   Vaping Use    Vaping Use: Never used   Substance and Sexual Activity    Alcohol use: No    Drug use: No    Sexual activity: Yes     Partners: Male     Birth control/protection: Female Sterilization   Other Topics Concern    None   Social History Narrative    COFFEE DRINKER    LACK OF EXERCISE    Lives with 2 children     Social Determinants of Health     Financial Resource Strain: Not on file   Food Insecurity: Not on file   Transportation Needs: Not on file   Physical Activity: Not on file   Stress: Not on file   Social Connections: Not on file   Intimate Partner Violence: Not on file   Housing Stability: Not on file        Current Outpatient Medications:     cetirizine (ZYRTEC ALLERGY) 10 mg tablet, Take 10 mg by mouth as needed  , Disp: , Rfl:     ferrous sulfate 324 (65 Fe) mg, Take 1 tablet (324 mg total) by mouth 2 (two) times a day before meals (Patient not taking: Reported on 4/26/2022 ), Disp: 60 tablet, Rfl: 5    lidocaine-prilocaine (EMLA) cream, Please specify directions, refills and quantity, Disp: 30 g, Rfl: 2    lidocaine-prilocaine (EMLA) cream, Apply topically as needed for mild pain, Disp: 30 g, Rfl: 3    multivitamin (THERAGRAN) TABS, Take 1 tablet by mouth daily  , Disp: , Rfl:     Review of Systems   Constitutional: Negative for appetite change, chills, fatigue, fever and unexpected weight change  HENT: Negative for congestion, hearing loss and postnasal drip  Respiratory: Negative for cough and shortness of breath  Cardiovascular: Negative for leg swelling  Genitourinary: Positive for dysuria (Due to ulcerations) and genital sores  Musculoskeletal: Negative for gait problem  Skin: Positive for wound (Vagina/vulva)  Negative for rash  Neurological: Negative for numbness  Hematological: Does not bruise/bleed easily  Psychiatric/Behavioral: Negative  Objective:  /80   Pulse 76   Temp (!) 97 °F (36 1 °C)   Resp 14   Pain Score: 0-No pain     Physical Exam  Vitals and nursing note reviewed  Constitutional:       Appearance: Normal appearance  She is well-developed and normal weight  HENT:      Head: Normocephalic and atraumatic  Cardiovascular:      Rate and Rhythm: Normal rate  Pulmonary:      Effort: Pulmonary effort is normal    Genitourinary:         Comments: Multiple shallow ulcerations at the enteritis  Very little slough or necrotic tissue  Ulcers are not is deep as previously   to palpation  No malodor  Skin:     General: Skin is warm and dry  Findings: Wound present  Neurological:      Mental Status: She is alert and oriented to person, place, and time  Psychiatric:         Attention and Perception: Attention normal          Mood and Affect: Mood and affect normal          Behavior: Behavior is cooperative           Cognition and Memory: Cognition normal               Wound 05/04/22 Vagina Outer (Active)   Wound Image Images linked 05/13/22 0900   Wound Description Pink;Yellow (1cm area of skin in between) 05/13/22 0856   Stefany-wound Assessment Intact 05/13/22 0856   Wound Length (cm) 5 cm 05/13/22 0856   Wound Width (cm) 4 5 cm 05/13/22 0856   Wound Depth (cm) 0 1 cm 05/13/22 0856   Wound Surface Area (cm^2) 22 5 cm^2 05/13/22 0856   Wound Volume (cm^3) 2 25 cm^3 05/13/22 0856   Calculated Wound Volume (cm^3) 2 25 cm^3 05/13/22 0856   Change in Wound Size % 58 33 05/13/22 0856   Drainage Amount Moderate 05/13/22 0856   Drainage Description Milky 05/13/22 0856   Non-staged Wound Description Full thickness 05/13/22 0856   Treatments Irrigation with NSS 05/13/22 0856                 Wound Instructions:  Orders Placed This Encounter   Procedures    Wound cleansing and dressings Discharged from the wound center  Continue treatment below and follow up with GYN  Contact the wound center if needed  Wash your hands with soap and water  Remove old dressing, discard into plastic bag and place in trash  Cleanse the wound with soap and water prior to applying a clean dressing  Do not use tissue or cotton balls  Do not scrub the wound  Pat dry using gauze  Shower yes  Apply A & D ointment  to the vulvar wound  Cover with gauze ( place gauze in folds of your skin)  Continue to do sitz baths and pull your labia apart to urinate, then rinse with water             Keep weight and pressure off wound at all times  , Turn every 2 hours  Avoid position directing pressure to Wound site  Limit side lying to 30 degree tilt  Limit the head of bed elevation to 30 degrees  and Do Not Sit for Long Periods of Time  Consume 3-4 servings of protein per day to assist with wound healing  Follow up at the wound center in one week  Treatment in the wound center today: Cleansed with normal saline  Patient to apply A & D ointment  Standing Status:   Future     Standing Expiration Date:   5/13/2023         Joshua Winston MD, CHT, CWS    Portions of the record may have been created with voice recognition software  Occasional wrong word or "sound alike" substitutions may have occurred due to the inherent limitations of voice recognition software  Read the chart carefully and recognize, using context, where substitutions have occurred

## 2022-05-13 NOTE — ASSESSMENT & PLAN NOTE
Patient currently having symptoms consistent with non recurrent acute pansinusitis  Will start patient on doxycycline 100 mg b i d  for the next 10 days given penicillin allergies  Also start patient on prednisone 20 mg daily for the next 5 days  Advised patient to use Flonase 2 sprays in each nostril daily  Start OTC allergy medication as needed such as Allegra, Claritin, Zyrtec  Follow-up as needed

## 2022-05-13 NOTE — PROGRESS NOTES
Assessment/Plan:    Vaginal ulceration  Patient having vaginal and vulvar ulcerations  Seen by wound care  Note reviewed  The ulcerations have improved  No discharge or bleeding  Patient was advised to do warm water soaks  Advised patient continue with this and follow-up with specialist as needed  Acute non-recurrent pansinusitis  Patient currently having symptoms consistent with non recurrent acute pansinusitis  Will start patient on doxycycline 100 mg b i d  for the next 10 days given penicillin allergies  Also start patient on prednisone 20 mg daily for the next 5 days  Advised patient to use Flonase 2 sprays in each nostril daily  Start OTC allergy medication as needed such as Allegra, Claritin, Zyrtec  Follow-up as needed  Diagnoses and all orders for this visit:    Acute non-recurrent pansinusitis  -     doxycycline hyclate (VIBRAMYCIN) 100 mg capsule; Take 1 capsule (100 mg total) by mouth every 12 (twelve) hours for 10 days  -     predniSONE 20 mg tablet; Take 1 tablet (20 mg total) by mouth in the morning for 5 days      Vaginal ulceration          Subjective:   Chief Complaint   Patient presents with    Follow-up     2 weeks      Health Maintenance   Topic Date Due    Hepatitis C Screening  Never done    COVID-19 Vaccine (1) Never done    HIV Screening  Never done    DTaP,Tdap,and Td Vaccines (1 - Tdap) 07/02/2020    Cervical Cancer Screening  01/30/2021    Annual Physical  07/02/2021    BMI: Followup Plan  05/20/2022    Influenza Vaccine (Season Ended) 09/01/2022    Breast Cancer Screening: Mammogram  01/25/2023    BMI: Adult  05/13/2023    Colorectal Cancer Screening  09/12/2026    Pneumococcal Vaccine: Pediatrics (0 to 5 Years) and At-Risk Patients (6 to 59 Years)  Aged Out    HIB Vaccine  Aged Out    Hepatitis B Vaccine  Aged Out    IPV Vaccine  Aged Out    Hepatitis A Vaccine  Aged Out    Meningococcal ACWY Vaccine  Aged Out    HPV Vaccine  Aged Out Patient ID: Paul Wright is a 47 y o  female  HPI    Patient having sinus problems for 2 weeks  Left ear pain and discomfort  Jaw pain on the left side, mostly in the lower jaw  Taking only tylenol, minimal improvement  Warm compresses  Pain still has not resolved  Having rhinorrhea and mild congestion  No cough  Does have postnasal drip  Two week follow-up  Patient reports that her vulvar ulcerations have improved  Last seen by a wound care  Discharge from clinic  Told to follow-up again if patient's wounds have worsen  Currently stable  Healing  No application of medications at this time  Advised to do warm water soaks as needed  The following portions of the patient's history were reviewed and updated as appropriate: allergies, current medications, past family history, past medical history, past social history, past surgical history, and problem list     Review of Systems   Constitutional: Negative for chills and fever  HENT: Positive for congestion, postnasal drip and rhinorrhea  Respiratory: Negative for cough and shortness of breath  Cardiovascular: Negative for chest pain and palpitations  Gastrointestinal: Negative for diarrhea, nausea and vomiting  Genitourinary: Negative for dysuria  Musculoskeletal: Negative for myalgias  Skin: Negative for rash  Neurological: Negative for dizziness and headaches  Objective:  /60   Pulse 92   Temp 98 7 °F (37 1 °C) (Tympanic)   Resp 16   Ht 5' (1 524 m)   Wt 70 8 kg (156 lb)   BMI 30 47 kg/m²      Physical Exam  Vitals and nursing note reviewed  Constitutional:       General: She is not in acute distress  HENT:      Head: Normocephalic and atraumatic  Comments: Maxillary and sphenoid sinus tenderness on palpation       Nose:      Comments: Boggy turbinates noted     Mouth/Throat:      Comments: Postnasal drip noted  Eyes:      Conjunctiva/sclera: Conjunctivae normal    Cardiovascular: Rate and Rhythm: Normal rate and regular rhythm  Pulses: Normal pulses  Heart sounds: No murmur heard  Pulmonary:      Effort: Pulmonary effort is normal  No respiratory distress  Breath sounds: No wheezing, rhonchi or rales  Lymphadenopathy:      Cervical: No cervical adenopathy  Neurological:      Mental Status: She is alert  This note has been constructed using a voice recognition system  There may be translation, syntax, or grammatical errors  If you have an questions, please contact the dictating provider

## 2022-05-13 NOTE — LETTER
May 13, 2022     Valarie Lawrence MD  [address] [facility] [address]    Patient: Jaja Ha   YOB: 1970   Date of Visit: 5/13/2022       Dear Dr Iam Romo: Thank you for referring Jaguar Hassan to me for evaluation  Ulcers are smaller and not as deep     Since she continues to use A&D ointment with symptomatic relief, we will discharge from the wound center  She may return should she have any deterioration  If you have questions, please do not hesitate to call me  Sincerely,        Da Bergman MD, CHT, CWS        CC: No Recipients  Da Bergman MD  5/13/2022  9:46 AM  Incomplete  Patient ID: Jaja Ha is a 47 y o  female Date of Birth 1970       Chief Complaint   Patient presents with    Follow Up Wound Care Visit     Vaginal wound       Allergies:  Red dye - food allergy, Clindamycin, Tizanidine, Latex, Mold extract [trichophyton], and Penicillins    Diagnosis:      Diagnosis ICD-10-CM Associated Orders   1  Vulvar ulceration  N76 6 Wound cleansing and dressings           Assessment & Plan:   Multiple vulvar ulcerations of unknown etiology, overall improved  Slightly less pain   Since patient most likely was not applying the ointment (A& D) to the open areas, we discussed how to do this and she will try   Since she is improving, patient will be discharged and will followup with her GYN  She may return to the wound center should she have deterioration  Subjective:   5/4/22:  1st visit for this 71-year-old female referred to the wound center because of vulvar ulcerations  Below is a complete summary of her recent history and problem  This is from her GYN     ""47 yr old F presents  for evaluation of vaginal ulcers  On April 13th she noted fever to 103  She was evaluated at an urgent care  Screening for the flu and COVID was negative  The following day she developed back pain  The fever persisted    On April 16th she felt worse and could not get out of bed  She had myalgias as well  She again went to an urgent care  A flu screen again was negative  She was prescribed a muscle relaxant for spasms and told to take Motrin  She had bilateral flank pain  Dysuria began  She developed hallucinations which she attributed to the anti spasmodic and was discontinued  Following day on Sunday the 17th she could not get out of bed  A temperature of a 103° was present this entire time  She was taken to [facility] by ambulance  When the squad came to her home she was found to be hypotensive  She was admitted with a diagnosis of sepsis, dehydration and hyponatremia  She was hospitalized for 6 days  A CT scan revealed ureteritis and a left ovarian cyst   She received IV vancomycin and rocephin and oral doxycycline      On the 19th, hospital day 3 she developed ulcers on her labia and in her mouth  They were painful  Her fever persisted  She was given Toradol for back pain  Upon her insistence she was discharged on the 22nd  Her temperature at that time was 100°  She had wanted her to stay for additional testing  She was seen by both Gynecology, Urology, rheumatology, and Infectious Disease  No one was able to make a diagnosis of the labial ulcers  The Gynecologist said that she had never seen anything like them before  She was finally afebrile on April 25th  Her maximum temperature yesterday was 99°     Today, the patient states that her mouth ulcers have improved with just salt water rinses though she is still unable to eat without discomfort  Her vaginal ulcers have worsened and complains of discharge now  States she is soaking through panties and has had to wear a liner  Complains of pain with urination from the urine touching her labia  She has not tried any remedies and she was not prescribed any ointment for pain relief at discharge  Pt saw her family physician today and genital cultures were collected   She was given repeat labs for chlamydia and gonorrhea however her GC/Chlamydia results were negative in the hospital       In addition, pt states that on 4/17 she had vaginal bleeding that lasted 2 days  She is unsure if it was the ulcers bleeding or her period  States her period usually lasts 7 days and is heavier than this episode  She is not sexually active at the moment and has been with her partner for 6 months  Last sexual intercourse was 1 year ago ""    Patient states that her fevers finally stopped about 3-4 days ago  She has generally feeling better but fatigued  She has returnedto work but only could do 4 hours at a time because of pain in the vulvar area  Her main complaint is burning of the area with urination  She has manage to address this with spreading the labia while urinating or soaking in water and urinating  She did notice a rather large amount of what she described as pus that drained  She was not sure what this actually was  This occured a day or two ago  She felt better  While hospitalized, all cultures including fungal, bacterial, and herpes were negative  Urine cultures were negative  Rheumatology felt that this was not Bechet's syndrome  Oral ulcers and labial ulcers all developed after she was admitted to the hospital   They have improved significantly  The oral ulcers are almost completely gone  Past medical history is otherwise unremarkable  5/13/22: Followup vulvar ulcerations of unknown etiology  She notes that slowly her pain is improving  She is using a A&D ointment but with further question in, not applying to the ulcerations  She was afraid to touch the areas  No further fever or chills  Overall appetite is better and she is feeling improved        The following portions of the patient's history were reviewed and updated as appropriate:   Patient Active Problem List   Diagnosis    Sebaceous cyst of labia    Allergic rhinitis    Arthralgia of multiple sites    Common migraine without aura    Depression with anxiety    Iron deficiency anemia due to chronic blood loss    Menorrhagia    Myofascial pain syndrome    Severe obesity (BMI 35 0-35 9 with comorbidity) ([facility])    Vitamin D deficiency    Chronic right-sided thoracic back pain    Abnormal weight gain    Cervical radiculopathy    Mass of sternum    Mid back pain    Dysmenorrhea    Skin lesion of left leg    Hot flashes    Epigastric pain    Anxiety    Other insomnia    Sepsis (HCC)    Ureteritis    Hypokalemia    Vulvar ulceration    History of sepsis    Oral ulceration    Lung field abnormal finding on examination    Vaginal ulceration    Symptom of leg swelling    Pleural effusion, left     Past Medical History:   Diagnosis Date    Allergic rhinitis     Anxiety     Arthralgia of multiple sites     Back pain     RIGHT LOW BACK PAIN   RESOLVED  [id number]  UPPER BACK PAIN RESOLVED [id number]    Breast nodule     Bronchitis     Cellulitis and abscess of leg     RESOLVED 2015    Chronic pain disorder     Common migraine without aura     Depression     Eczema     Fatigue     Forceps delivery      4 para 3     H/O molar pregnancy, antepartum     Herpes zoster     [id number] RESOLVED    Iron deficiency anemia     Irregular menses     Myalgia     Obesity     Pain in left shoulder     Prior pregnancy with fetal demise     Right shoulder pain     Sebaceous cyst     Sinusitis     Tinea corporis     Trichomoniasis     Vaginitis     Vitamin D deficiency     Yeast UTI      Past Surgical History:   Procedure Laterality Date    ADENOIDECTOMY      APPENDECTOMY      DILATION AND CURETTAGE OF UTERUS      LAPAROSCOPY      OOPHORECTOMY Right     age 28    OVARY SURGERY      TONSILLECTOMY      OVER AGE 15    TUBAL LIGATION      VENTRAL HERNIA REPAIR       Family History   Problem Relation Age of Onset    COPD Father     Lung cancer Father 76    Diabetes Maternal Grandmother     Diabetes Maternal Aunt     No Known Problems Mother     No Known Problems Daughter     No Known Problems Maternal Grandfather     No Known Problems Half-Sister     No Known Problems Half-Sister     No Known Problems Maternal Aunt     No Known Problems Maternal Aunt     No Known Problems Maternal Aunt     No Known Problems Maternal Aunt     No Known Problems Paternal Aunt     Hypertension Neg Hx     Heart disease Neg Hx     Stroke Neg Hx     Thyroid disease Neg Hx       Social History     Socioeconomic History    Marital status:      Spouse name: None    Number of children: 3    Years of education: None    Highest education level: Master's degree (e g , MA, MS, Barry, MEd, MSW, MAYTE)   Occupational History    Occupation: conselor/ director   Tobacco Use    Smoking status: Never Smoker    Smokeless tobacco: Never Used   Vaping Use    Vaping Use: Never used   Substance and Sexual Activity    Alcohol use: No    Drug use: No    Sexual activity: Yes     Partners: Male     Birth control/protection: Female Sterilization   Other Topics Concern    None   Social History Narrative    COFFEE DRINKER    LACK OF EXERCISE    Lives with 2 children     Social Determinants of Health     Financial Resource Strain: Not on file   Food Insecurity: Not on file   Transportation Needs: Not on file   Physical Activity: Not on file   Stress: Not on file   Social Connections: Not on file   Intimate Partner Violence: Not on file   Housing Stability: Not on file        Current Outpatient Medications:     cetirizine (ZYRTEC ALLERGY) 10 mg tablet, Take 10 mg by mouth as needed  , Disp: , Rfl:     ferrous sulfate 324 (65 Fe) mg, Take 1 tablet (324 mg total) by mouth 2 (two) times a day before meals (Patient not taking: Reported on 4/26/2022 ), Disp: 60 tablet, Rfl: 5    lidocaine-prilocaine (EMLA) cream, Please specify directions, refills and quantity, Disp: 30 g, Rfl: 2    lidocaine-prilocaine (EMLA) cream, Apply topically as needed for mild pain, Disp: 30 g, Rfl: 3    multivitamin (THERAGRAN) TABS, Take 1 tablet by mouth daily  , Disp: , Rfl:     Review of Systems   Constitutional: Negative for appetite change, chills, fatigue, fever and unexpected weight change  HENT: Negative for congestion, hearing loss and postnasal drip  Respiratory: Negative for cough and shortness of breath  Cardiovascular: Negative for leg swelling  Genitourinary: Positive for dysuria (Due to ulcerations) and genital sores  Musculoskeletal: Negative for gait problem  Skin: Positive for wound (Vagina/vulva)  Negative for rash  Neurological: Negative for numbness  Hematological: Does not bruise/bleed easily  Psychiatric/Behavioral: Negative  Objective:  /80   Pulse 76   Temp (!) 97 °F (36 1 °C)   Resp 14   Pain Score: 0-No pain     Physical Exam  Vitals and nursing note reviewed  Constitutional:       Appearance: Normal appearance  She is well-developed and normal weight  HENT:      Head: Normocephalic and atraumatic  Cardiovascular:      Rate and Rhythm: Normal rate  Pulmonary:      Effort: Pulmonary effort is normal    Genitourinary:         Comments: Multiple shallow ulcerations at the enteritis  Very little slough or necrotic tissue  Ulcers are not is deep as previously   to palpation  No malodor  Skin:     General: Skin is warm and dry  Findings: Wound present  Neurological:      Mental Status: She is alert and oriented to person, place, and time  Psychiatric:         Attention and Perception: Attention normal          Mood and Affect: Mood and affect normal          Behavior: Behavior is cooperative           Cognition and Memory: Cognition normal               Wound 05/04/22 Vagina Outer (Active)   Wound Image Images linked 05/13/22 0900   Wound Description Pink;Yellow (1cm area of skin in between) 05/13/22 0856   Stefany-wound Assessment Intact 05/13/22 0856 Wound Length (cm) 5 cm 05/13/22 0856   Wound Width (cm) 4 5 cm 05/13/22 0856   Wound Depth (cm) 0 1 cm 05/13/22 0856   Wound Surface Area (cm^2) 22 5 cm^2 05/13/22 0856   Wound Volume (cm^3) 2 25 cm^3 05/13/22 0856   Calculated Wound Volume (cm^3) 2 25 cm^3 05/13/22 0856   Change in Wound Size % 58 33 05/13/22 0856   Drainage Amount Moderate 05/13/22 0856   Drainage Description Milky 05/13/22 0856   Non-staged Wound Description Full thickness 05/13/22 0856   Treatments Irrigation with NSS 05/13/22 0856                 Wound Instructions:  Orders Placed This Encounter   Procedures    Wound cleansing and dressings     Discharged from the wound center  Continue treatment below and follow up with GYN  Contact the wound center if needed  Wash your hands with soap and water  Remove old dressing, discard into plastic bag and place in trash  Cleanse the wound with soap and water prior to applying a clean dressing  Do not use tissue or cotton balls  Do not scrub the wound  Pat dry using gauze  Shower yes  Apply A & D ointment  to the vulvar wound  Cover with gauze ( place gauze in folds of your skin)  Continue to do sitz baths and pull your labia apart to urinate, then rinse with water             Keep weight and pressure off wound at all times  , Turn every 2 hours  Avoid position directing pressure to Wound site  Limit side lying to 30 degree tilt  Limit the head of bed elevation to 30 degrees  and Do Not Sit for Long Periods of Time  Consume 3-4 servings of protein per day to assist with wound healing  Follow up at the wound center in one week  Treatment in the wound center today: Cleansed with normal saline  Patient to apply A & D ointment  Standing Status:   Future     Standing Expiration Date:   5/13/2023         Raghav Crawford MD, CHT, CWS    Portions of the record may have been created with voice recognition software   Occasional wrong word or "sound alike" substitutions may have occurred due to the inherent limitations of voice recognition software  Read the chart carefully and recognize, using context, where substitutions have occurred  Al Nelson MD  5/13/2022  9:34 AM  Incomplete  Patient ID: Alonzo Zimmer is a 47 y o  female Date of Birth 1970       Chief Complaint   Patient presents with    Follow Up Wound Care Visit     Vaginal wound       Allergies:  Red dye - food allergy, Clindamycin, Tizanidine, Latex, Mold extract [trichophyton], and Penicillins    Diagnosis:      Diagnosis ICD-10-CM Associated Orders   1   Vulvar ulceration  N76 6 Wound cleansing and dressings           Assessment & Plan:   ***         Subjective:   HPI    The following portions of the patient's history were reviewed and updated as appropriate:   Patient Active Problem List   Diagnosis    Sebaceous cyst of labia    Allergic rhinitis    Arthralgia of multiple sites    Common migraine without aura    Depression with anxiety    Iron deficiency anemia due to chronic blood loss    Menorrhagia    Myofascial pain syndrome    Severe obesity (BMI 35 0-35 9 with comorbidity) (HCC)    Vitamin D deficiency    Chronic right-sided thoracic back pain    Abnormal weight gain    Cervical radiculopathy    Mass of sternum    Mid back pain    Dysmenorrhea    Skin lesion of left leg    Hot flashes    Epigastric pain    Anxiety    Other insomnia    Sepsis (HCC)    Ureteritis    Hypokalemia    Vulvar ulceration    History of sepsis    Oral ulceration    Lung field abnormal finding on examination    Vaginal ulceration    Symptom of leg swelling    Pleural effusion, left     Past Medical History:   Diagnosis Date    Allergic rhinitis     Anxiety     Arthralgia of multiple sites     Back pain     RIGHT LOW BACK PAIN   RESOLVED  [id number]  UPPER BACK PAIN RESOLVED [id number]    Breast nodule     Bronchitis     Cellulitis and abscess of leg     RESOLVED 8 JUN 2015    Chronic pain disorder     Common migraine without aura     Depression     Eczema     Fatigue     Forceps delivery      4 para 4     H/O molar pregnancy, antepartum     Herpes zoster     [id number] RESOLVED    Iron deficiency anemia     Irregular menses     Myalgia     Obesity     Pain in left shoulder     Prior pregnancy with fetal demise     Right shoulder pain     Sebaceous cyst     Sinusitis     Tinea corporis     Trichomoniasis     Vaginitis     Vitamin D deficiency     Yeast UTI      Past Surgical History:   Procedure Laterality Date    ADENOIDECTOMY      APPENDECTOMY      DILATION AND CURETTAGE OF UTERUS      LAPAROSCOPY      OOPHORECTOMY Right     age 28    OVARY SURGERY      TONSILLECTOMY      OVER AGE 15    TUBAL LIGATION      VENTRAL HERNIA REPAIR       Family History   Problem Relation Age of Onset    COPD Father     Lung cancer Father 76    Diabetes Maternal Grandmother     Diabetes Maternal Aunt     No Known Problems Mother     No Known Problems Daughter     No Known Problems Maternal Grandfather     No Known Problems Half-Sister     No Known Problems Half-Sister     No Known Problems Maternal Aunt     No Known Problems Maternal Aunt     No Known Problems Maternal Aunt     No Known Problems Maternal Aunt     No Known Problems Paternal Aunt     Hypertension Neg Hx     Heart disease Neg Hx     Stroke Neg Hx     Thyroid disease Neg Hx       Social History     Socioeconomic History    Marital status:      Spouse name: None    Number of children: 3    Years of education: None    Highest education level: Master's degree (e g , MA, MS, Barry, MEd, MSW, MAYTE)   Occupational History    Occupation: conselor/ director   Tobacco Use    Smoking status: Never Smoker    Smokeless tobacco: Never Used   Vaping Use    Vaping Use: Never used   Substance and Sexual Activity    Alcohol use: No    Drug use: No    Sexual activity: Yes     Partners: Male Birth control/protection: Female Sterilization   Other Topics Concern    None   Social History Narrative    COFFEE DRINKER    LACK OF EXERCISE    Lives with 2 children     Social Determinants of Health     Financial Resource Strain: Not on file   Food Insecurity: Not on file   Transportation Needs: Not on file   Physical Activity: Not on file   Stress: Not on file   Social Connections: Not on file   Intimate Partner Violence: Not on file   Housing Stability: Not on file        Current Outpatient Medications:     cetirizine (ZYRTEC ALLERGY) 10 mg tablet, Take 10 mg by mouth as needed  , Disp: , Rfl:     ferrous sulfate 324 (65 Fe) mg, Take 1 tablet (324 mg total) by mouth 2 (two) times a day before meals (Patient not taking: Reported on 4/26/2022 ), Disp: 60 tablet, Rfl: 5    lidocaine-prilocaine (EMLA) cream, Please specify directions, refills and quantity, Disp: 30 g, Rfl: 2    lidocaine-prilocaine (EMLA) cream, Apply topically as needed for mild pain, Disp: 30 g, Rfl: 3    multivitamin (THERAGRAN) TABS, Take 1 tablet by mouth daily  , Disp: , Rfl:     Review of Systems    Objective:  /80   Pulse 76   Temp (!) 97 °F (36 1 °C)   Resp 14   Pain Score: 0-No pain     Physical Exam    Wound 05/04/22 Vagina Outer (Active)   Wound Image Images linked 05/13/22 0900   Wound Description Pink;Yellow (1cm area of skin in between) 05/13/22 0856   Stefany-wound Assessment Intact 05/13/22 0856   Wound Length (cm) 5 cm 05/13/22 0856   Wound Width (cm) 4 5 cm 05/13/22 0856   Wound Depth (cm) 0 1 cm 05/13/22 0856   Wound Surface Area (cm^2) 22 5 cm^2 05/13/22 0856   Wound Volume (cm^3) 2 25 cm^3 05/13/22 0856   Calculated Wound Volume (cm^3) 2 25 cm^3 05/13/22 0856   Change in Wound Size % 58 33 05/13/22 0856   Drainage Amount Moderate 05/13/22 0856   Drainage Description Milky 05/13/22 0856   Non-staged Wound Description Full thickness 05/13/22 0856   Treatments Irrigation with NSS 05/13/22 0856       Procedures Wound Instructions:  Orders Placed This Encounter   Procedures    Wound cleansing and dressings     Discharged from the wound center  Continue treatment below and follow up with GYN  Contact the wound center if needed  Wash your hands with soap and water  Remove old dressing, discard into plastic bag and place in trash  Cleanse the wound with soap and water prior to applying a clean dressing  Do not use tissue or cotton balls  Do not scrub the wound  Pat dry using gauze  Shower yes  Apply A & D ointment  to the vulvar wound  Cover with gauze ( place gauze in folds of your skin)  Continue to do sitz baths and pull your labia apart to urinate, then rinse with water             Keep weight and pressure off wound at all times  , Turn every 2 hours  Avoid position directing pressure to Wound site  Limit side lying to 30 degree tilt  Limit the head of bed elevation to 30 degrees  and Do Not Sit for Long Periods of Time  Consume 3-4 servings of protein per day to assist with wound healing  Follow up at the wound center in one week  Treatment in the wound center today: Cleansed with normal saline  Patient to apply A & D ointment  Standing Status:   Future     Standing Expiration Date:   5/13/2023       Total time spent today:  *** minutes  This includes reviewing the patient's chart, pertinent physician records ***   Da Bergman MD, CHT, CWS    Portions of the record may have been created with voice recognition software  Occasional wrong word or "sound alike" substitutions may have occurred due to the inherent limitations of voice recognition software  Read the chart carefully and recognize, using context, where substitutions have occurred

## 2022-05-13 NOTE — LETTER
May 13, 2022     Roxi Ramirez MD  [facility] [address]    Patient: Job Pardo   YOB: 1970   Date of Visit: 5/13/2022       Dear Dr Joel Carrillo: Thank you for referring Woody Bell to me for evaluation  Overall, her ulcerations are smaller and not as deep  She has less pain  She continues to use A& D ointment for symptomatic relief   Therefore, she will be discharged from the wound center to return should she have deterioration  If you have questions, please do not hesitate to call me  Sincerely,        Cecil Boss MD        CC: No Recipients  Cecil Boss MD  5/13/2022  9:46 AM  Sign when Signing Visit  Patient ID: Job Pardo is a 47 y o  female Date of Birth 1970       Chief Complaint   Patient presents with    Follow Up Wound Care Visit     Vaginal wound       Allergies:  Red dye - food allergy, Clindamycin, Tizanidine, Latex, Mold extract [trichophyton], and Penicillins    Diagnosis:      Diagnosis ICD-10-CM Associated Orders   1  Vulvar ulceration  N76 6 Wound cleansing and dressings           Assessment & Plan:   Multiple vulvar ulcerations of unknown etiology, overall improved  Slightly less pain   Since patient most likely was not applying the ointment (A& D) to the open areas, we discussed how to do this and she will try   Since she is improving, patient will be discharged and will followup with her GYN  She may return to the wound center should she have deterioration  Subjective:   5/4/22:  1st visit for this 76-year-old female referred to the wound center because of vulvar ulcerations  Below is a complete summary of her recent history and problem  This is from her GYN     ""47 yr old F presents  for evaluation of vaginal ulcers  On April 13th she noted fever to 103  She was evaluated at an urgent care  Screening for the flu and COVID was negative  The following day she developed back pain    The fever persisted  On April 16th she felt worse and could not get out of bed  She had myalgias as well  She again went to an urgent care  A flu screen again was negative  She was prescribed a muscle relaxant for spasms and told to take Motrin  She had bilateral flank pain  Dysuria began  She developed hallucinations which she attributed to the anti spasmodic and was discontinued  Following day on Sunday the 17th she could not get out of bed  A temperature of a 103° was present this entire time  She was taken to [facility] by ambulance  When the squad came to her home she was found to be hypotensive  She was admitted with a diagnosis of sepsis, dehydration and hyponatremia  She was hospitalized for 6 days  A CT scan revealed ureteritis and a left ovarian cyst   She received IV vancomycin and rocephin and oral doxycycline      On the 19th, hospital day 3 she developed ulcers on her labia and in her mouth  They were painful  Her fever persisted  She was given Toradol for back pain  Upon her insistence she was discharged on the 22nd  Her temperature at that time was 100°  She had wanted her to stay for additional testing  She was seen by both Gynecology, Urology, rheumatology, and Infectious Disease  No one was able to make a diagnosis of the labial ulcers  The Gynecologist said that she had never seen anything like them before  She was finally afebrile on April 25th  Her maximum temperature yesterday was 99°     Today, the patient states that her mouth ulcers have improved with just salt water rinses though she is still unable to eat without discomfort  Her vaginal ulcers have worsened and complains of discharge now  States she is soaking through panties and has had to wear a liner  Complains of pain with urination from the urine touching her labia  She has not tried any remedies and she was not prescribed any ointment for pain relief at discharge   Pt saw her family physician today and genital cultures were collected  She was given repeat labs for chlamydia and gonorrhea however her GC/Chlamydia results were negative in the hospital       In addition, pt states that on 4/17 she had vaginal bleeding that lasted 2 days  She is unsure if it was the ulcers bleeding or her period  States her period usually lasts 7 days and is heavier than this episode  She is not sexually active at the moment and has been with her partner for 6 months  Last sexual intercourse was 1 year ago ""    Patient states that her fevers finally stopped about 3-4 days ago  She has generally feeling better but fatigued  She has returnedto work but only could do 4 hours at a time because of pain in the vulvar area  Her main complaint is burning of the area with urination  She has manage to address this with spreading the labia while urinating or soaking in water and urinating  She did notice a rather large amount of what she described as pus that drained  She was not sure what this actually was  This occured a day or two ago  She felt better  While hospitalized, all cultures including fungal, bacterial, and herpes were negative  Urine cultures were negative  Rheumatology felt that this was not Bechet's syndrome  Oral ulcers and labial ulcers all developed after she was admitted to the hospital   They have improved significantly  The oral ulcers are almost completely gone  Past medical history is otherwise unremarkable  5/13/22: Followup vulvar ulcerations of unknown etiology  She notes that slowly her pain is improving  She is using a A&D ointment but with further question in, not applying to the ulcerations  She was afraid to touch the areas  No further fever or chills  Overall appetite is better and she is feeling improved        The following portions of the patient's history were reviewed and updated as appropriate:   Patient Active Problem List   Diagnosis    Sebaceous cyst of labia    Allergic rhinitis    Arthralgia of multiple sites    Common migraine without aura    Depression with anxiety    Iron deficiency anemia due to chronic blood loss    Menorrhagia    Myofascial pain syndrome    Severe obesity (BMI 35 0-35 9 with comorbidity) ([facility])    Vitamin D deficiency    Chronic right-sided thoracic back pain    Abnormal weight gain    Cervical radiculopathy    Mass of sternum    Mid back pain    Dysmenorrhea    Skin lesion of left leg    Hot flashes    Epigastric pain    Anxiety    Other insomnia    Sepsis (HCC)    Ureteritis    Hypokalemia    Vulvar ulceration    History of sepsis    Oral ulceration    Lung field abnormal finding on examination    Vaginal ulceration    Symptom of leg swelling    Pleural effusion, left     Past Medical History:   Diagnosis Date    Allergic rhinitis     Anxiety     Arthralgia of multiple sites     Back pain     RIGHT LOW BACK PAIN   RESOLVED  [id number]  UPPER BACK PAIN RESOLVED [id number]    Breast nodule     Bronchitis     Cellulitis and abscess of leg     RESOLVED 2015    Chronic pain disorder     Common migraine without aura     Depression     Eczema     Fatigue     Forceps delivery      4 para 3     H/O molar pregnancy, antepartum     Herpes zoster     [id number] RESOLVED    Iron deficiency anemia     Irregular menses     Myalgia     Obesity     Pain in left shoulder     Prior pregnancy with fetal demise     Right shoulder pain     Sebaceous cyst     Sinusitis     Tinea corporis     Trichomoniasis     Vaginitis     Vitamin D deficiency     Yeast UTI      Past Surgical History:   Procedure Laterality Date    ADENOIDECTOMY      APPENDECTOMY      DILATION AND CURETTAGE OF UTERUS      LAPAROSCOPY      OOPHORECTOMY Right     age 28    OVARY SURGERY      TONSILLECTOMY      OVER AGE 16    TUBAL LIGATION      VENTRAL HERNIA REPAIR       Family History   Problem Relation Age of Onset    COPD Father    Stanislav Mahoney Lung cancer Father 76    Diabetes Maternal Grandmother     Diabetes Maternal Aunt     No Known Problems Mother     No Known Problems Daughter     No Known Problems Maternal Grandfather     No Known Problems Half-Sister     No Known Problems Half-Sister     No Known Problems Maternal Aunt     No Known Problems Maternal Aunt     No Known Problems Maternal Aunt     No Known Problems Maternal Aunt     No Known Problems Paternal Aunt     Hypertension Neg Hx     Heart disease Neg Hx     Stroke Neg Hx     Thyroid disease Neg Hx       Social History     Socioeconomic History    Marital status:      Spouse name: None    Number of children: 3    Years of education: None    Highest education level: Master's degree (e g , MA, MS, Barry, MEd, MSW, MAYTE)   Occupational History    Occupation: conselor/ director   Tobacco Use    Smoking status: Never Smoker    Smokeless tobacco: Never Used   Vaping Use    Vaping Use: Never used   Substance and Sexual Activity    Alcohol use: No    Drug use: No    Sexual activity: Yes     Partners: Male     Birth control/protection: Female Sterilization   Other Topics Concern    None   Social History Narrative    COFFEE DRINKER    LACK OF EXERCISE    Lives with 2 children     Social Determinants of Health     Financial Resource Strain: Not on file   Food Insecurity: Not on file   Transportation Needs: Not on file   Physical Activity: Not on file   Stress: Not on file   Social Connections: Not on file   Intimate Partner Violence: Not on file   Housing Stability: Not on file        Current Outpatient Medications:     cetirizine (ZYRTEC ALLERGY) 10 mg tablet, Take 10 mg by mouth as needed  , Disp: , Rfl:     ferrous sulfate 324 (65 Fe) mg, Take 1 tablet (324 mg total) by mouth 2 (two) times a day before meals (Patient not taking: Reported on 4/26/2022 ), Disp: 60 tablet, Rfl: 5    lidocaine-prilocaine (EMLA) cream, Please specify directions, refills and quantity, Disp: 30 g, Rfl: 2    lidocaine-prilocaine (EMLA) cream, Apply topically as needed for mild pain, Disp: 30 g, Rfl: 3    multivitamin (THERAGRAN) TABS, Take 1 tablet by mouth daily  , Disp: , Rfl:     Review of Systems   Constitutional: Negative for appetite change, chills, fatigue, fever and unexpected weight change  HENT: Negative for congestion, hearing loss and postnasal drip  Respiratory: Negative for cough and shortness of breath  Cardiovascular: Negative for leg swelling  Genitourinary: Positive for dysuria (Due to ulcerations) and genital sores  Musculoskeletal: Negative for gait problem  Skin: Positive for wound (Vagina/vulva)  Negative for rash  Neurological: Negative for numbness  Hematological: Does not bruise/bleed easily  Psychiatric/Behavioral: Negative  Objective:  /80   Pulse 76   Temp (!) 97 °F (36 1 °C)   Resp 14   Pain Score: 0-No pain     Physical Exam  Vitals and nursing note reviewed  Constitutional:       Appearance: Normal appearance  She is well-developed and normal weight  HENT:      Head: Normocephalic and atraumatic  Cardiovascular:      Rate and Rhythm: Normal rate  Pulmonary:      Effort: Pulmonary effort is normal    Genitourinary:         Comments: Multiple shallow ulcerations at the enteritis  Very little slough or necrotic tissue  Ulcers are not is deep as previously   to palpation  No malodor  Skin:     General: Skin is warm and dry  Findings: Wound present  Neurological:      Mental Status: She is alert and oriented to person, place, and time  Psychiatric:         Attention and Perception: Attention normal          Mood and Affect: Mood and affect normal          Behavior: Behavior is cooperative           Cognition and Memory: Cognition normal               Wound 05/04/22 Vagina Outer (Active)   Wound Image Images linked 05/13/22 0900   Wound Description Pink;Yellow (1cm area of skin in between) 05/13/22 8644 Stefany-wound Assessment Intact 05/13/22 0856   Wound Length (cm) 5 cm 05/13/22 0856   Wound Width (cm) 4 5 cm 05/13/22 0856   Wound Depth (cm) 0 1 cm 05/13/22 0856   Wound Surface Area (cm^2) 22 5 cm^2 05/13/22 0856   Wound Volume (cm^3) 2 25 cm^3 05/13/22 0856   Calculated Wound Volume (cm^3) 2 25 cm^3 05/13/22 0856   Change in Wound Size % 58 33 05/13/22 0856   Drainage Amount Moderate 05/13/22 0856   Drainage Description Milky 05/13/22 0856   Non-staged Wound Description Full thickness 05/13/22 0856   Treatments Irrigation with NSS 05/13/22 0856                 Wound Instructions:  Orders Placed This Encounter   Procedures    Wound cleansing and dressings     Discharged from the wound center  Continue treatment below and follow up with GYN  Contact the wound center if needed  Wash your hands with soap and water  Remove old dressing, discard into plastic bag and place in trash  Cleanse the wound with soap and water prior to applying a clean dressing  Do not use tissue or cotton balls  Do not scrub the wound  Pat dry using gauze  Shower yes  Apply A & D ointment  to the vulvar wound  Cover with gauze ( place gauze in folds of your skin)  Continue to do sitz baths and pull your labia apart to urinate, then rinse with water             Keep weight and pressure off wound at all times  , Turn every 2 hours  Avoid position directing pressure to Wound site  Limit side lying to 30 degree tilt  Limit the head of bed elevation to 30 degrees  and Do Not Sit for Long Periods of Time  Consume 3-4 servings of protein per day to assist with wound healing  Follow up at the wound center in one week  Treatment in the wound center today: Cleansed with normal saline  Patient to apply A & D ointment  Standing Status:   Future     Standing Expiration Date:   5/13/2023         Eduardo Bower MD, CHT, CWS    Portions of the record may have been created with voice recognition software   Occasional wrong word or "sound alike" substitutions may have occurred due to the inherent limitations of voice recognition software  Read the chart carefully and recognize, using context, where substitutions have occurred

## 2022-05-18 LAB — FUNGUS SPEC CULT: NORMAL

## 2022-05-24 ENCOUNTER — TELEPHONE (OUTPATIENT)
Dept: FAMILY MEDICINE CLINIC | Facility: CLINIC | Age: 52
End: 2022-05-24

## 2022-05-24 ENCOUNTER — TELEPHONE (OUTPATIENT)
Dept: OBGYN CLINIC | Facility: CLINIC | Age: 52
End: 2022-05-24

## 2022-05-24 NOTE — TELEPHONE ENCOUNTER
Patient ([phone]) called to report that she had urinalysis done at [facility] in [place] after symptoms of itching developed  Patient called to request that provider review results and call with recommendations  Results not present in chart at time of call  Contact patient if further information is needed  Patient [facility] [place] if needed

## 2022-05-24 NOTE — TELEPHONE ENCOUNTER
Spoke with pt and informed urine studies were not ordered by us so she can call her PCP to discuss  Pt wanted to move her appt up from 5/31 if possible- having vaginal itching  There was an opening for this Friday at 9:20 so that was scheduled for her   Verbalized understanding to all

## 2022-05-25 LAB
BACTERIA UR QL AUTO: ABNORMAL /HPF
CAOX CRY #/AREA URNS HPF: ABNORMAL /HPF
HYALINE CASTS #/AREA URNS LPF: ABNORMAL /LPF
RBC #/AREA URNS HPF: ABNORMAL /HPF
SQUAMOUS #/AREA URNS HPF: ABNORMAL /HPF
WBC #/AREA URNS HPF: ABNORMAL /HPF

## 2022-05-26 ENCOUNTER — TELEPHONE (OUTPATIENT)
Dept: FAMILY MEDICINE CLINIC | Facility: CLINIC | Age: 52
End: 2022-05-26

## 2022-05-26 NOTE — TELEPHONE ENCOUNTER
Patient phoned to requesting the result of her urine culture so she can have medication ordered  States she hasn't been able to work due to the discomfort  Please call patient at [phone]

## 2022-05-26 NOTE — PROGRESS NOTES
Assessment/Plan:  Continued improvement with vulvar ulceration  Urinalysis and Urine culture reviewed with the patient - she did not understand the Epic notations  RTO 2 months - Annual  Symptomatic care       Diagnoses and all orders for this visit:    Vulvar ulceration    Oral ulceration    History of sepsis    Ureteritis              Subjective:        Patient ID: Fede Walter is a 47 y o  female  Allayne Zack returns today for follow-up  She continues to make progress and feels improved  Her biggest complaint at this time is fatigue  She has returned to work  Sometimes she leaves at 2 p m  due to the fatigue  After a nap she feels much better  She still has mild terminal dysuria if urine hits the skin  A negative urine culture was performed recently  She did not fill the last Emla prescription because that was not covered by insurance  I cost $ 50  She continues to use A and D ointment  She was discharged from wound care 2 weeks ago  She has an appointment for follow-up in August with her PCP  I suggested she make an appointment sooner due to the fatigue  She has recently been treated for sinusitis and finish doxycycline 3 days ago  The following portions of the patient's history were reviewed and updated as appropriate: She  has a past medical history of Allergic rhinitis, Anxiety, Arthralgia of multiple sites, Back pain, Breast nodule, Bronchitis, Cellulitis and abscess of leg, Chronic pain disorder, Common migraine without aura, Depression, Eczema, Fatigue, Forceps delivery,  4 para 4, H/O molar pregnancy, antepartum, Herpes zoster, Iron deficiency anemia, Irregular menses, Myalgia, Obesity, Pain in left shoulder, Prior pregnancy with fetal demise, Right shoulder pain, Sebaceous cyst, Sinusitis, Tinea corporis, Trichomoniasis, Vaginitis, Vitamin D deficiency, and Yeast UTI    Patient Active Problem List    Diagnosis Date Noted    Acute non-recurrent pansinusitis 2022    Pleural effusion, left 05/08/2022    Vulvar ulceration 04/26/2022    History of sepsis 04/26/2022    Oral ulceration 04/26/2022    Lung field abnormal finding on examination 04/26/2022    Vaginal ulceration 04/26/2022    Symptom of leg swelling 04/26/2022    Sepsis ([facility] ) 04/25/2022    Ureteritis 04/25/2022    Hypokalemia 04/25/2022    Anxiety 08/16/2021    Other insomnia 08/16/2021    Epigastric pain 08/12/2021    Skin lesion of left leg 05/20/2021    Hot flashes 05/20/2021    Dysmenorrhea 07/20/2020    Mid back pain     Mass of sternum 05/08/2019    Cervical radiculopathy 10/31/2018    Abnormal weight gain 07/24/2018    Chronic right-sided thoracic back pain 06/15/2018    Sebaceous cyst of labia 01/30/2018    Arthralgia of multiple sites 01/12/2018    Myofascial pain syndrome 01/12/2018    Severe obesity (BMI 35 0-35 9 with comorbidity) ([facility] ) 01/12/2018    Iron deficiency anemia due to chronic blood loss 06/08/2016    Vitamin D deficiency 06/08/2016    Menorrhagia 06/08/2015    Common migraine without aura 08/21/2013    Allergic rhinitis 01/29/2013    Depression with anxiety 01/29/2013     She  has a past surgical history that includes Appendectomy; Ovary surgery; Dilation and curettage of uterus; LAPAROSCOPY; Tonsillectomy; Tubal ligation; Ventral hernia repair; Adenoidectomy; and Oophorectomy (Right)  Her family history includes COPD in her father; Diabetes in her maternal aunt and maternal grandmother; Lung cancer (age of onset: 76) in her father; No Known Problems in her daughter, half-sister, half-sister, maternal aunt, maternal aunt, maternal aunt, maternal aunt, maternal grandfather, mother, and paternal aunt  She  reports that she has never smoked  She has never used smokeless tobacco  She reports that she does not drink alcohol and does not use drugs    Current Outpatient Medications   Medication Sig Dispense Refill    cetirizine (ZyrTEC) 10 mg tablet Take 10 mg by mouth as needed  multivitamin (THERAGRAN) TABS Take 1 tablet by mouth daily        ferrous sulfate 324 (65 Fe) mg Take 1 tablet (324 mg total) by mouth 2 (two) times a day before meals (Patient not taking: No sig reported) 60 tablet 5    lidocaine-prilocaine (EMLA) cream Please specify directions, refills and quantity (Patient not taking: Reported on 5/27/2022) 30 g 2    lidocaine-prilocaine (EMLA) cream Apply topically as needed for mild pain (Patient not taking: Reported on 5/27/2022) 30 g 3     No current facility-administered medications for this visit  Current Outpatient Medications on File Prior to Visit   Medication Sig    cetirizine (ZyrTEC) 10 mg tablet Take 10 mg by mouth as needed      multivitamin (THERAGRAN) TABS Take 1 tablet by mouth daily      ferrous sulfate 324 (65 Fe) mg Take 1 tablet (324 mg total) by mouth 2 (two) times a day before meals (Patient not taking: No sig reported)    lidocaine-prilocaine (EMLA) cream Please specify directions, refills and quantity (Patient not taking: Reported on 5/27/2022)    lidocaine-prilocaine (EMLA) cream Apply topically as needed for mild pain (Patient not taking: Reported on 5/27/2022)     No current facility-administered medications on file prior to visit  She is allergic to red dye - food allergy, clindamycin, tizanidine, latex, mold extract [trichophyton], and penicillins       Review of Systems   Constitutional: Negative  Negative for chills and fever  Respiratory: Negative for shortness of breath  Cardiovascular: Negative for chest pain  Gastrointestinal: Negative for abdominal pain  Genitourinary: Positive for dysuria (Minimal), vaginal discharge (Improving) and vaginal pain (Improving)  Negative for decreased urine volume, difficulty urinating and vaginal bleeding  Skin: Positive for wound  Negative for rash  Hematological: Negative for adenopathy           Objective:    Vitals:    05/27/22 0907   BP: 92/70   Weight: 76 8 kg (169 lb 6 4 oz)            Physical Exam  Vitals and nursing note reviewed  Exam conducted with a chaperone present  Constitutional:       Appearance: Normal appearance  HENT:      Head: Normocephalic  Eyes:      General: No scleral icterus  Right eye: No discharge  Left eye: No discharge  Extraocular Movements: Extraocular movements intact  Abdominal:      General: Abdomen is flat  Palpations: Abdomen is soft  Tenderness: There is no abdominal tenderness  Genitourinary:     Comments: Greatly diminished area of ulceration  Continued healing evident  See picture  Neurological:      Mental Status: She is alert and oriented to person, place, and time  Psychiatric:         Mood and Affect: Mood normal          Behavior: Behavior normal          Thought Content:  Thought content normal          Judgment: Judgment normal

## 2022-05-26 NOTE — TELEPHONE ENCOUNTER
----- Message from Elizabeth Tran MD sent at 5/24/2022  2:39 PM EDT -----  Regarding: FW: Ichy in vagina area  Please call patient  I do not see full U/A report  ( only 1 st page with nor results)  Ask patient to resend urinalysis, 2nd page is missing ?    ----- Message -----  From: Abran De Leon  Sent: 5/24/2022   1:12 PM EDT  To: Elizabeth Tran MD  Subject: FW: Ichy in vagina area                            ----- Message -----  From: Guanako Sheriff  Sent: 5/24/2022  12:00 PM EDT  To: Maggie Parker [facility]  Subject: Ana Grissom in vagina area                              I have been experiencing severe ichy in my vagina area  I wont see Dr Ivan Barragan until Friday at 9:20 AM  Can you please review my test results from urinalisis and see if I have an infection? If I do can you please order me something?    [facility] [place] [address] [facility]  PLEASE and [facility]!!  [phone]

## 2022-05-26 NOTE — TELEPHONE ENCOUNTER
Please obtain urine culture report from [address] [place] [address] to see what antibiotic is appropriate to prescribe  If patient has vaginal itching recommend to contact gynecology Dr Ainsley Blevins to discuss treatment

## 2022-05-27 ENCOUNTER — OFFICE VISIT (OUTPATIENT)
Dept: OBGYN CLINIC | Facility: CLINIC | Age: 52
End: 2022-05-27
Payer: COMMERCIAL

## 2022-05-27 VITALS — SYSTOLIC BLOOD PRESSURE: 92 MMHG | WEIGHT: 169.4 LBS | DIASTOLIC BLOOD PRESSURE: 70 MMHG | BODY MASS INDEX: 33.08 KG/M2

## 2022-05-27 DIAGNOSIS — K12.1 ORAL ULCERATION: ICD-10-CM

## 2022-05-27 DIAGNOSIS — N76.6 VULVAR ULCERATION: Primary | ICD-10-CM

## 2022-05-27 DIAGNOSIS — N28.89 URETERITIS: ICD-10-CM

## 2022-05-27 DIAGNOSIS — Z86.19 HISTORY OF SEPSIS: ICD-10-CM

## 2022-05-27 PROCEDURE — 1036F TOBACCO NON-USER: CPT | Performed by: OBSTETRICS & GYNECOLOGY

## 2022-05-27 PROCEDURE — 99213 OFFICE O/P EST LOW 20 MIN: CPT | Performed by: OBSTETRICS & GYNECOLOGY

## 2022-06-28 ENCOUNTER — TELEPHONE (OUTPATIENT)
Dept: FAMILY MEDICINE CLINIC | Facility: CLINIC | Age: 52
End: 2022-06-28

## 2022-06-28 DIAGNOSIS — L65.9 HAIR LOSS: Primary | ICD-10-CM

## 2022-06-28 NOTE — TELEPHONE ENCOUNTER
Please call patient  She has H/o anemia  Hair loss could be related to low iron, thyroid disease, stress related to her illness / hospitalization for sepsis  Kidney infection  Recommend to check labs  Order placed in chart

## 2022-06-28 NOTE — TELEPHONE ENCOUNTER
Patient ([phone]) called to report issue with hair loss  States it developed recently  Losing clumps of hair from back of head  Not sure if issue is related to prior illness or side effect of medication  Requests call with doctors recommendations  Will schedule appointment for office visit if recommended

## 2022-07-11 ENCOUNTER — TELEPHONE (OUTPATIENT)
Dept: RADIOLOGY | Facility: HOSPITAL | Age: 52
End: 2022-07-11

## 2022-07-11 ENCOUNTER — TELEPHONE (OUTPATIENT)
Dept: FAMILY MEDICINE CLINIC | Facility: CLINIC | Age: 52
End: 2022-07-11

## 2022-07-11 NOTE — TELEPHONE ENCOUNTER
Janessa Lopez Scan (Phone: [phone]) needs clarification of Cat Scan Order for abdomen pelvis tomorrow at 12:30 p m -is it with or without contrast?

## 2022-07-11 NOTE — TELEPHONE ENCOUNTER
I spoke with the office for CT order clarification,  Ordered without contrast but under comments it states IV contrast and order pushed from May to July due to contrast shortage  Office will call back and if order is changed to with contrast the patients insurance authorization will also need to be updated

## 2022-07-15 LAB
ALBUMIN SERPL-MCNC: 4 G/DL (ref 3.6–5.1)
ALBUMIN/GLOB SERPL: 1.5 (CALC) (ref 1–2.5)
ALP SERPL-CCNC: 89 U/L (ref 37–153)
ALT SERPL-CCNC: 12 U/L (ref 6–29)
AST SERPL-CCNC: 14 U/L (ref 10–35)
BILIRUB SERPL-MCNC: 0.6 MG/DL (ref 0.2–1.2)
BUN SERPL-MCNC: 11 MG/DL (ref 7–25)
BUN/CREAT SERPL: NORMAL (CALC) (ref 6–22)
CALCIUM SERPL-MCNC: 9.1 MG/DL (ref 8.6–10.4)
CHLORIDE SERPL-SCNC: 106 MMOL/L (ref 98–110)
CO2 SERPL-SCNC: 27 MMOL/L (ref 20–32)
CREAT SERPL-MCNC: 0.64 MG/DL (ref 0.5–1.03)
FERRITIN SERPL-MCNC: 17 NG/ML (ref 16–232)
GFR/BSA.PRED SERPLBLD CYS-BASED-ARV: 107 ML/MIN/1.73M2
GLOBULIN SER CALC-MCNC: 2.6 G/DL (CALC) (ref 1.9–3.7)
GLUCOSE SERPL-MCNC: 82 MG/DL (ref 65–99)
IRON SATN MFR SERPL: 25 % (CALC) (ref 16–45)
IRON SERPL-MCNC: 80 MCG/DL (ref 45–160)
POTASSIUM SERPL-SCNC: 4.4 MMOL/L (ref 3.5–5.3)
PROT SERPL-MCNC: 6.6 G/DL (ref 6.1–8.1)
SODIUM SERPL-SCNC: 140 MMOL/L (ref 135–146)
TIBC SERPL-MCNC: 320 MCG/DL (CALC) (ref 250–450)
TSH SERPL-ACNC: 1.56 MIU/L

## 2022-07-19 ENCOUNTER — HOSPITAL ENCOUNTER (OUTPATIENT)
Dept: RADIOLOGY | Facility: HOSPITAL | Age: 52
Discharge: HOME | End: 2022-07-19
Payer: COMMERCIAL

## 2022-07-19 DIAGNOSIS — N28.89 URETERITIS: ICD-10-CM

## 2022-07-19 PROCEDURE — G1004 CDSM NDSC: HCPCS

## 2022-07-19 PROCEDURE — 74176 CT ABD & PELVIS W/O CONTRAST: CPT

## 2022-09-07 NOTE — PROGRESS NOTES
Assessment/Plan:  NGE                                                                           BCM-  NA     Co- Testing q 5 yrs  '23                                           RTO 1 yr  SBA monthly  3 D Mammography   Colonoscopy  46  Exercise 3/wk                  Calcium 1,000 mg/d with Vit D     Depression Screen: Neg       Diagnoses and all orders for this visit:    Encounter for annual routine gynecological examination    Encounter for screening mammogram for breast cancer  -     Mammo screening bilateral w 3d & cad; Future              Subjective:        Patient ID: Sofía Hennessy is a 47 y o  female  Chen Cabar presents today for her yearly evaluation  She has almost fully recovered from the vulvar and perineal ulcerations  The tissue feels almost normal to her  In  she started having hair loss and fatigue  PCP thought it was due to iron deficiency anemia and she began iron therapy  She is also referred to a dermatologist who thought it may have been a related to the shock of her April illness  She was placed on steroid drops for the scalp has seen improved  She continues to have regular periods  Her last menstrual period was   She is not sexually active  She has not had intercourse since   She had a follow-up CT scan in July to evaluate the kidneys    This was normal       The following portions of the patient's history were reviewed and updated as appropriate: She  has a past medical history of Allergic rhinitis, Anxiety, Arthralgia of multiple sites, Back pain, Breast nodule, Bronchitis, Cellulitis and abscess of leg, Chronic pain disorder, Common migraine without aura, Depression, Eczema, Fatigue, Forceps delivery,  4 para 4, H/O molar pregnancy, antepartum, Herpes zoster, Iron deficiency anemia, Irregular menses, Myalgia, Obesity, Pain in left shoulder, Prior pregnancy with fetal demise, Right shoulder pain, Sebaceous cyst, Sinusitis, Tinea corporis, Trichomoniasis, Vaginitis, Vitamin D deficiency, and Yeast UTI  Patient Active Problem List    Diagnosis Date Noted    Acute non-recurrent pansinusitis 05/13/2022    Pleural effusion, left 05/08/2022    Vulvar ulceration 04/26/2022    History of sepsis 04/26/2022    Oral ulceration 04/26/2022    Lung field abnormal finding on examination 04/26/2022    Vaginal ulceration 04/26/2022    Symptom of leg swelling 04/26/2022    Sepsis ([facility] [address] ) 04/25/2022    Ureteritis 04/25/2022    Hypokalemia 04/25/2022    Anxiety 08/16/2021    Other insomnia 08/16/2021    Epigastric pain 08/12/2021    Skin lesion of left leg 05/20/2021    Hot flashes 05/20/2021    Dysmenorrhea 07/20/2020    Mid back pain     Mass of sternum 05/08/2019    Cervical radiculopathy 10/31/2018    Abnormal weight gain 07/24/2018    Chronic right-sided thoracic back pain 06/15/2018    Sebaceous cyst of labia 01/30/2018    Arthralgia of multiple sites 01/12/2018    Myofascial pain syndrome 01/12/2018    Severe obesity (BMI 35 0-35 9 with comorbidity) ([facility] [address] ) 01/12/2018    Iron deficiency anemia due to chronic blood loss 06/08/2016    Vitamin D deficiency 06/08/2016    Menorrhagia 06/08/2015    Common migraine without aura 08/21/2013    Allergic rhinitis 01/29/2013    Depression with anxiety 01/29/2013   PMH:   Menarche 12  G4,P3; Molar pregnancy  LTL  Umbilical Hernia - herniorrhaphy and abdominoplasty '01  Depression and Anxiety '13  Migraines '13  Menorrhagia '15  Leg Abscess and Cellulitis 6/15  Shingles 8/15  Obesity  Trichomoniasis '14  Vitamin D Deficiency  Ureteritis,Sepsis; Vulvar and Buccal Ulcers developed during hospitalization 4/22  Iron deficiency Anemia 6/22  Hair Loss - 6/24/22  She  has a past surgical history that includes Appendectomy; Ovary surgery; Dilation and curettage of uterus; LAPAROSCOPY; Tonsillectomy; Tubal ligation; Ventral hernia repair;  Adenoidectomy; and Oophorectomy (Right)  Her family history includes COPD in her father; Diabetes in her maternal aunt and maternal grandmother; Lung cancer (age of onset: 76) in her father; No Known Problems in her daughter, half-sister, half-sister, maternal aunt, maternal aunt, maternal aunt, maternal aunt, maternal grandfather, mother, and paternal aunt  FH:  M - Dementia  She  reports that she has never smoked  She has never used smokeless tobacco  She reports that she does not drink alcohol and does not use drugs  SH:  Works for [employer]  Will be leaving and starting a new job in [place]  [place]  Not in a relationship  Current Outpatient Medications   Medication Sig Dispense Refill    cetirizine (ZyrTEC) 10 mg tablet Take 10 mg by mouth as needed        ferrous sulfate 324 (65 Fe) mg Take 1 tablet (324 mg total) by mouth 2 (two) times a day before meals 60 tablet 5    multivitamin (THERAGRAN) TABS Take 1 tablet by mouth daily         No current facility-administered medications for this visit  Current Outpatient Medications on File Prior to Visit   Medication Sig    cetirizine (ZyrTEC) 10 mg tablet Take 10 mg by mouth as needed      ferrous sulfate 324 (65 Fe) mg Take 1 tablet (324 mg total) by mouth 2 (two) times a day before meals    multivitamin (THERAGRAN) TABS Take 1 tablet by mouth daily      [DISCONTINUED] lidocaine-prilocaine (EMLA) cream Please specify directions, refills and quantity (Patient not taking: Reported on 5/27/2022)    [DISCONTINUED] lidocaine-prilocaine (EMLA) cream Apply topically as needed for mild pain (Patient not taking: Reported on 5/27/2022)     No current facility-administered medications on file prior to visit  She is allergic to red dye - food allergy, clindamycin, tizanidine, latex, mold extract [trichophyton], and penicillins       Review of Systems   Constitutional: Positive for fatigue   Negative for activity change, appetite change and unexpected weight change  Hair loss starting in June   Eyes: Negative for visual disturbance  Respiratory: Negative for cough, chest tightness, shortness of breath and wheezing  Cardiovascular: Negative for chest pain, palpitations and leg swelling  Breast: Patient denies tenderness, nipple discharge, masses, or erythema  Gastrointestinal: Negative for abdominal distention, abdominal pain, blood in stool, constipation, diarrhea, nausea and vomiting  Endocrine: Negative for cold intolerance and heat intolerance  Genitourinary: Negative for decreased urine volume, difficulty urinating, dysuria, frequency, hematuria, menstrual problem, pelvic pain, urgency, vaginal bleeding, vaginal discharge and vaginal pain  Chronic stress incontinence since childbirth   Musculoskeletal: Positive for arthralgias (Left shoulder and left knee)  Skin: Negative for rash  Neurological: Negative for weakness, light-headedness, numbness and headaches  Hematological: Does not bruise/bleed easily  Psychiatric/Behavioral: Negative for agitation, behavioral problems and sleep disturbance  The patient is not nervous/anxious  Objective:    Vitals:    09/08/22 0928   BP: 120/80   BP Location: Left arm   Patient Position: Sitting   Cuff Size: Standard   Weight: 81 kg (178 lb 9 6 oz)   Height: 5' 1" (1 549 m)            Physical Exam  Vitals and nursing note reviewed  Constitutional:       General: She is not in acute distress  Appearance: Normal appearance  She is well-developed  HENT:      Head: Normocephalic and atraumatic  Eyes:      General: No scleral icterus  Right eye: No discharge  Left eye: No discharge  Extraocular Movements: Extraocular movements intact  Conjunctiva/sclera: Conjunctivae normal    Neck:      Thyroid: No thyromegaly  Trachea: No tracheal deviation  Cardiovascular:      Rate and Rhythm: Normal rate and regular rhythm        Heart sounds: Normal heart sounds  No murmur heard  Pulmonary:      Effort: Pulmonary effort is normal  No respiratory distress  Breath sounds: Normal breath sounds  No wheezing  Chest:   Breasts: Breasts are symmetrical       Right: No inverted nipple, mass, nipple discharge, skin change or tenderness  Left: No inverted nipple, mass, nipple discharge, skin change or tenderness  Abdominal:      General: Bowel sounds are normal  There is no distension  Palpations: Abdomen is soft  There is no mass  Tenderness: There is no abdominal tenderness  There is no guarding or rebound  Genitourinary:     General: Normal vulva  Labia:         Right: No rash, tenderness or lesion  Left: No rash, tenderness or lesion  Vagina: Normal       Cervix: No cervical motion tenderness or discharge  Uterus: Not deviated, not enlarged and not tender  Adnexa:         Right: No mass, tenderness or fullness  Left: No mass, tenderness or fullness  Rectum: No external hemorrhoid  Comments: Urethral meatus within normal limits  Perineum within normal limits  Bladder well supported  Vulvar ulcerations and perineal ulcerations are almost completely healed  The new skin is evident  There is introital and perineal relaxation from childbirth  The uterus is retroflexed with the cervix being elevated and behind the symphysis  The uterus is top-normal size  Musculoskeletal:         General: No tenderness  Normal range of motion  Cervical back: Normal range of motion and neck supple  Lymphadenopathy:      Cervical: No cervical adenopathy  Skin:     General: Skin is warm and dry  Neurological:      Mental Status: She is alert and oriented to person, place, and time  Psychiatric:         Mood and Affect: Mood normal          Behavior: Behavior normal          Thought Content:  Thought content normal          Judgment: Judgment normal

## 2022-09-08 ENCOUNTER — ANNUAL EXAM (OUTPATIENT)
Dept: OBGYN CLINIC | Facility: CLINIC | Age: 52
End: 2022-09-08
Payer: COMMERCIAL

## 2022-09-08 VITALS
HEIGHT: 61 IN | BODY MASS INDEX: 33.72 KG/M2 | DIASTOLIC BLOOD PRESSURE: 80 MMHG | SYSTOLIC BLOOD PRESSURE: 120 MMHG | WEIGHT: 178.6 LBS

## 2022-09-08 DIAGNOSIS — Z01.419 ENCOUNTER FOR ANNUAL ROUTINE GYNECOLOGICAL EXAMINATION: Primary | ICD-10-CM

## 2022-09-08 DIAGNOSIS — Z12.31 ENCOUNTER FOR SCREENING MAMMOGRAM FOR BREAST CANCER: ICD-10-CM

## 2022-09-08 PROCEDURE — 99396 PREV VISIT EST AGE 40-64: CPT | Performed by: OBSTETRICS & GYNECOLOGY

## 2022-09-08 PROCEDURE — 0503F POSTPARTUM CARE VISIT: CPT | Performed by: OBSTETRICS & GYNECOLOGY

## 2022-10-11 PROBLEM — J01.40 ACUTE NON-RECURRENT PANSINUSITIS: Status: RESOLVED | Noted: 2022-05-13 | Resolved: 2022-10-11

## 2022-10-11 PROBLEM — A41.9 SEPSIS (HCC): Status: RESOLVED | Noted: 2022-04-25 | Resolved: 2022-10-11

## 2022-11-30 ENCOUNTER — TELEPHONE (OUTPATIENT)
Dept: FAMILY MEDICINE CLINIC | Facility: CLINIC | Age: 52
End: 2022-11-30

## 2022-11-30 NOTE — TELEPHONE ENCOUNTER
Body aches, head cold and severe cough  She would like to aleve the cough  Patient states she has done a Covid test and it is negative  She was in [place] last week and since the flight back she has been feeling sick

## 2022-11-30 NOTE — TELEPHONE ENCOUNTER
Patient called and requested if you could give her an idea of what medication she can take otc since she is traveling and will not be in the state to do vv or come in to the office

## 2023-03-06 ENCOUNTER — TELEPHONE (OUTPATIENT)
Dept: OBGYN CLINIC | Facility: CLINIC | Age: 53
End: 2023-03-06

## 2023-03-06 NOTE — TELEPHONE ENCOUNTER
Patient's last period was in November, had intercourse over the weekend and is spotting, should she be concerned? Should she make appointment?

## 2023-03-06 NOTE — TELEPHONE ENCOUNTER
Pt is going through menopause  Last period was 11/26  Pt had dark spotting over weekend (2 days) and now it stopped  Please call pt to discuss

## 2023-03-06 NOTE — TELEPHONE ENCOUNTER
Not necessarily  When I saw her for her yearly evaluation she was not sexually active and had not had intercourse since 2020  This may be due to atrophy  If there was dryness or discomfort a lubricant is probably necessary  She is welcome to come in to be evaluated but it is not an emergency

## 2023-05-03 ENCOUNTER — TELEPHONE (OUTPATIENT)
Dept: FAMILY MEDICINE CLINIC | Facility: CLINIC | Age: 53
End: 2023-05-03

## 2023-05-03 NOTE — TELEPHONE ENCOUNTER
Patient called to report moving out of area  Changed practice and requesting records  Please update PCP in chart

## 2023-09-22 NOTE — ASSESSMENT & PLAN NOTE
Will check X-ray R shoulder  Recommended icing, avoid heavy lifting, strenuous activities  Consider orthopedic surgeon evaluation is continues with pain, decreased ROM  Eye Protection Verbiage: Before proceeding with the stage, a plastic scleral shield was inserted. The globe was anesthetized with a few drops of 1% lidocaine with 1:100,000 epinephrine. Then, an appropriate sized scleral shield was chosen and coated with lacrilube ointment. The shield was gently inserted and left in place for the duration of each stage. After the stage was completed, the shield was gently removed.